# Patient Record
Sex: MALE | Race: WHITE | Employment: FULL TIME | URBAN - METROPOLITAN AREA
[De-identification: names, ages, dates, MRNs, and addresses within clinical notes are randomized per-mention and may not be internally consistent; named-entity substitution may affect disease eponyms.]

---

## 2017-01-29 NOTE — ED INITIAL ASSESSMENT (HPI)
Pt was found passed out on the floor in the lobby of his apartment building. States he is having \"pain all over\" due to his arthritis. But denies any acute injury or pain. Pt admits to drinking vodka, unsure how much.

## 2017-01-29 NOTE — ED PROVIDER NOTES
Patient Seen in: [facility] AND [facility] Emergency Department    History   Patient presents with:  Alcohol Intoxication (neurologic)    Stated Complaint:  Alcohol intoxication    HPI    28 yo M without PMH presenting for evaluation via EMS after being found pass Resp 16  SpO2 100%        Physical Exam   Constitutional: No distress. Smells of alcohol, inebriated. Slurring speech  HEENT: MMM. Head: Normocephalic. Atraumatic. Eyes: No injection. Pupils midrange and equally reactive. Neck: Neck supple.  No midline c

## 2017-01-29 NOTE — ED NOTES
Pt walked to restroom with assistance's, pt gait is unsteady at this time, pt now sitting up eating breakfast, pt denies having anyone to pick him up

## 2017-06-06 ENCOUNTER — HOSPITAL ENCOUNTER (OUTPATIENT)
Age: 37
Discharge: HOME | End: 2017-06-06
Payer: COMMERCIAL

## 2017-06-06 VITALS
BODY MASS INDEX: 19.64 KG/M2 | RESPIRATION RATE: 16 BRPM | HEART RATE: 95 BPM | WEIGHT: 145 LBS | TEMPERATURE: 98 F | SYSTOLIC BLOOD PRESSURE: 134 MMHG | HEIGHT: 72 IN | DIASTOLIC BLOOD PRESSURE: 82 MMHG | OXYGEN SATURATION: 98 %

## 2017-06-06 DIAGNOSIS — R11.11 NON-INTRACTABLE VOMITING WITHOUT NAUSEA, UNSPECIFIED VOMITING TYPE: Primary | ICD-10-CM

## 2017-06-06 PROCEDURE — 99213 OFFICE O/P EST LOW 20 MIN: CPT

## 2017-06-06 PROCEDURE — 99214 OFFICE O/P EST MOD 30 MIN: CPT

## 2017-06-06 RX ORDER — ONDANSETRON 4 MG/1
4 TABLET, ORALLY DISINTEGRATING ORAL EVERY 8 HOURS PRN
Qty: 10 TABLET | Refills: 0 | Status: SHIPPED | OUTPATIENT
Start: 2017-06-06 | End: 2017-06-13

## 2017-06-06 NOTE — ED INITIAL ASSESSMENT (HPI)
PATIENT ARRIVED AMBULATORY TO ROOM. SYMPTOMS STARTED YESTERDAY. +GENERALIZED WEAKNESS AND FEVERS. PT DENIES TAKING TEMPERATURES BUT STATES \"I FELT HOT\"  PATIENT STATES LAST NIGHT HE STARTED VOMITING.  PATIENT HAD SEVERAL EPISODES OF VOMITING THROUGHOUT THE

## 2017-06-06 NOTE — ED PROVIDER NOTES
Patient Seen in: [facility]    History   Patient presents with:  Vomiting    Stated Complaint: vomiting    HPI    Patient is a 60-year-old male who presents for evaluation of intermittent vomiting since 1 PM yesterday.   P vomiting. Negative for abdominal pain and diarrhea. Skin: Negative. Positive for stated complaint: vomiting  Other systems are as noted in HPI. Constitutional and vital signs reviewed.       All other systems reviewed and negative except as noted follow-up with primary doctor. Go to ER if symptoms worsen or if there is concern for dehydration.              Disposition and Plan     Clinical Impression:  Non-intractable vomiting without nausea, unspecified vomiting type  (primary encounter diagnosis)

## 2017-09-01 ENCOUNTER — HOSPITAL ENCOUNTER (INPATIENT)
Facility: HOSPITAL | Age: 37
LOS: 2 days | Discharge: HOME | DRG: 103 | End: 2017-09-04
Attending: EMERGENCY MEDICINE | Admitting: HOSPITALIST
Payer: COMMERCIAL

## 2017-09-01 DIAGNOSIS — E10.10 TYPE 1 DIABETES MELLITUS WITH KETOACIDOSIS WITHOUT COMA (HCC): Primary | ICD-10-CM

## 2017-09-01 LAB
ALBUMIN SERPL BCP-MCNC: 4.1 G/DL (ref 3.5–4.8)
ALBUMIN/GLOB SERPL: 1.1 {RATIO} (ref 1–2)
ALP SERPL-CCNC: 44 U/L (ref 32–100)
ALT SERPL-CCNC: 72 U/L (ref 17–63)
ANION GAP SERPL CALC-SCNC: 25 MMOL/L (ref 0–18)
AST SERPL-CCNC: 165 U/L (ref 15–41)
BASOPHILS # BLD: 0 K/UL (ref 0–0.2)
BASOPHILS NFR BLD: 0 %
BILIRUB SERPL-MCNC: 3.5 MG/DL (ref 0.3–1.2)
BUN SERPL-MCNC: 13 MG/DL (ref 8–20)
BUN/CREAT SERPL: 6.5 (ref 10–20)
CALCIUM SERPL-MCNC: 9.5 MG/DL (ref 8.5–10.5)
CHLORIDE SERPL-SCNC: 99 MMOL/L (ref 95–110)
CO2 SERPL-SCNC: 16 MMOL/L (ref 22–32)
CREAT SERPL-MCNC: 1.99 MG/DL (ref 0.5–1.5)
EOSINOPHIL # BLD: 0 K/UL (ref 0–0.7)
EOSINOPHIL NFR BLD: 0 %
ERYTHROCYTE [DISTWIDTH] IN BLOOD BY AUTOMATED COUNT: 12.7 % (ref 11–15)
GLOBULIN PLAS-MCNC: 3.6 G/DL (ref 2.5–3.7)
GLUCOSE SERPL-MCNC: 161 MG/DL (ref 70–99)
HCT VFR BLD AUTO: 43.9 % (ref 41–52)
HGB BLD-MCNC: 14.6 G/DL (ref 13.5–17.5)
LIPASE SERPL-CCNC: 22 U/L (ref 22–51)
LYMPHOCYTES # BLD: 0.3 K/UL (ref 1–4)
LYMPHOCYTES NFR BLD: 2 %
MCH RBC QN AUTO: 34.6 PG (ref 27–32)
MCHC RBC AUTO-ENTMCNC: 33.3 G/DL (ref 32–37)
MCV RBC AUTO: 103.7 FL (ref 80–100)
MONOCYTES # BLD: 1 K/UL (ref 0–1)
MONOCYTES NFR BLD: 6 %
NEUTROPHILS # BLD AUTO: 15.5 K/UL (ref 1.8–7.7)
NEUTROPHILS NFR BLD: 92 %
OSMOLALITY UR CALC.SUM OF ELEC: 294 MOSM/KG (ref 275–295)
PLATELET # BLD AUTO: 208 K/UL (ref 140–400)
PMV BLD AUTO: 9.5 FL (ref 7.4–10.3)
POTASSIUM SERPL-SCNC: 7.1 MMOL/L (ref 3.3–5.1)
PROT SERPL-MCNC: 7.7 G/DL (ref 5.9–8.4)
RBC # BLD AUTO: 4.23 M/UL (ref 4.5–5.9)
SODIUM SERPL-SCNC: 140 MMOL/L (ref 136–144)
WBC # BLD AUTO: 16.9 K/UL (ref 4–11)

## 2017-09-01 RX ORDER — CALCIUM GLUCONATE 94 MG/ML
1 INJECTION, SOLUTION INTRAVENOUS ONCE
Status: COMPLETED | OUTPATIENT
Start: 2017-09-01 | End: 2017-09-02

## 2017-09-01 RX ORDER — FAMOTIDINE 20 MG/1
20 TABLET ORAL
COMMUNITY
End: 2018-07-12 | Stop reason: ALTCHOICE

## 2017-09-01 RX ORDER — SODIUM CHLORIDE 9 MG/ML
INJECTION, SOLUTION INTRAVENOUS CONTINUOUS
Status: DISCONTINUED | OUTPATIENT
Start: 2017-09-01 | End: 2017-09-02

## 2017-09-01 RX ORDER — LORAZEPAM 2 MG/ML
1 INJECTION INTRAMUSCULAR ONCE
Status: COMPLETED | OUTPATIENT
Start: 2017-09-01 | End: 2017-09-01

## 2017-09-01 RX ORDER — DEXTROSE MONOHYDRATE 25 G/50ML
50 INJECTION, SOLUTION INTRAVENOUS ONCE
Status: DISCONTINUED | OUTPATIENT
Start: 2017-09-01 | End: 2017-09-04

## 2017-09-01 RX ORDER — SODIUM POLYSTYRENE SULFONATE 15 G/60ML
30 SUSPENSION ORAL; RECTAL ONCE
Status: COMPLETED | OUTPATIENT
Start: 2017-09-01 | End: 2017-09-02

## 2017-09-01 RX ORDER — ONDANSETRON 4 MG/1
4 TABLET, ORALLY DISINTEGRATING ORAL ONCE
Status: COMPLETED | OUTPATIENT
Start: 2017-09-01 | End: 2017-09-01

## 2017-09-02 ENCOUNTER — APPOINTMENT (OUTPATIENT)
Dept: GENERAL RADIOLOGY | Facility: HOSPITAL | Age: 37
DRG: 103 | End: 2017-09-02
Attending: EMERGENCY MEDICINE
Payer: COMMERCIAL

## 2017-09-02 ENCOUNTER — APPOINTMENT (OUTPATIENT)
Dept: GENERAL RADIOLOGY | Facility: HOSPITAL | Age: 37
DRG: 103 | End: 2017-09-02
Attending: INTERNAL MEDICINE
Payer: COMMERCIAL

## 2017-09-02 PROBLEM — E10.10 TYPE 1 DIABETES MELLITUS WITH KETOACIDOSIS WITHOUT COMA (HCC): Status: ACTIVE | Noted: 2017-09-02

## 2017-09-02 PROBLEM — E87.2 ACIDOSIS: Status: ACTIVE | Noted: 2017-09-02

## 2017-09-02 PROBLEM — E87.20 ACIDOSIS: Status: ACTIVE | Noted: 2017-09-02

## 2017-09-02 LAB
ANION GAP SERPL CALC-SCNC: 5 MMOL/L (ref 0–18)
ANION GAP SERPL CALC-SCNC: 6 MMOL/L (ref 0–18)
BACTERIA UR QL AUTO: NEGATIVE /HPF
BASE EXCESS BLD CALC-SCNC: -0.3 MMOL/L (ref ?–2)
BASE EXCESS BLD CALC-SCNC: -1.2 MMOL/L (ref ?–2)
BASOPHILS # BLD: 0.1 K/UL (ref 0–0.2)
BASOPHILS NFR BLD: 1 %
BILIRUB UR QL: NEGATIVE
BUN SERPL-MCNC: 15 MG/DL (ref 8–20)
BUN SERPL-MCNC: 18 MG/DL (ref 8–20)
BUN/CREAT SERPL: 10.7 (ref 10–20)
BUN/CREAT SERPL: 11.9 (ref 10–20)
CA-I BLD-SCNC: 1.13 MMOL/L (ref 0.95–1.32)
CALCIUM SERPL-MCNC: 7.4 MG/DL (ref 8.5–10.5)
CALCIUM SERPL-MCNC: 7.5 MG/DL (ref 8.5–10.5)
CHLORIDE SERPL-SCNC: 106 MMOL/L (ref 95–110)
CHLORIDE SERPL-SCNC: 109 MMOL/L (ref 95–110)
CO2 SERPL-SCNC: 23 MMOL/L (ref 22–32)
CO2 SERPL-SCNC: 26 MMOL/L (ref 22–32)
COHGB MFR BLD: 1.6 % (ref 0–1.5)
COLOR UR: YELLOW
CREAT SERPL-MCNC: 1.4 MG/DL (ref 0.5–1.5)
CREAT SERPL-MCNC: 1.51 MG/DL (ref 0.5–1.5)
EOSINOPHIL # BLD: 0 K/UL (ref 0–0.7)
EOSINOPHIL NFR BLD: 0 %
ERYTHROCYTE [DISTWIDTH] IN BLOOD BY AUTOMATED COUNT: 12.1 % (ref 11–15)
ETHANOL SERPL-MCNC: 1 MG/DL
FERRITIN SERPL IA-MCNC: 326 NG/ML (ref 24–336)
FOLATE SERPL-MCNC: 2.7 NG/ML
GLUCOSE BLDC GLUCOMTR-MCNC: 105 MG/DL (ref 70–99)
GLUCOSE BLDC GLUCOMTR-MCNC: 116 MG/DL (ref 70–99)
GLUCOSE BLDC GLUCOMTR-MCNC: 127 MG/DL (ref 70–99)
GLUCOSE BLDC GLUCOMTR-MCNC: 149 MG/DL (ref 70–99)
GLUCOSE BLDC GLUCOMTR-MCNC: 182 MG/DL (ref 70–99)
GLUCOSE BLDC GLUCOMTR-MCNC: 184 MG/DL (ref 70–99)
GLUCOSE BLDC GLUCOMTR-MCNC: 189 MG/DL (ref 70–99)
GLUCOSE BLDC GLUCOMTR-MCNC: 253 MG/DL (ref 70–99)
GLUCOSE BLDC GLUCOMTR-MCNC: 279 MG/DL (ref 70–99)
GLUCOSE BLDC GLUCOMTR-MCNC: 293 MG/DL (ref 70–99)
GLUCOSE BLDC GLUCOMTR-MCNC: 312 MG/DL (ref 70–99)
GLUCOSE BLDC GLUCOMTR-MCNC: 78 MG/DL (ref 70–99)
GLUCOSE SERPL-MCNC: 172 MG/DL (ref 70–99)
GLUCOSE SERPL-MCNC: 191 MG/DL (ref 70–99)
GLUCOSE UR-MCNC: >=500 MG/DL
GRAN CASTS #/AREA UR COMP ASSIST: 3 /LPF
HBA1C MFR BLD: 4.4 % (ref 4–6)
HBA1C MFR BLD: 4.4 % (ref 4–6)
HCO3 BLDA-SCNC: 22.3 MEQ/L (ref 21–27)
HCO3 BLDA-SCNC: 22.3 MEQ/L (ref 21–27)
HCT VFR BLD AUTO: 29.8 % (ref 41–52)
HCT VFR BLD AUTO: 30.3 % (ref 41–52)
HGB BLD-MCNC: 10.2 G/DL (ref 13.5–17.5)
HGB BLD-MCNC: 10.3 G/DL (ref 13.5–17.5)
HGB BLD-MCNC: 9.9 G/DL (ref 13.5–17.5)
HGB UR QL STRIP.AUTO: NEGATIVE
HYALINE CASTS #/AREA URNS AUTO: 14 /LPF
KETONES UR-MCNC: 20 MG/DL
LACTATE SERPL-SCNC: 0.9 MMOL/L (ref 0.5–2.2)
LACTATE SERPL-SCNC: 2.6 MMOL/L (ref 0.5–2.2)
LACTATE SERPL-SCNC: 6.7 MMOL/L (ref 0.5–2.2)
LEUKOCYTE ESTERASE UR QL STRIP.AUTO: NEGATIVE
LYMPHOCYTES # BLD: 0.5 K/UL (ref 1–4)
LYMPHOCYTES NFR BLD: 6 %
MAGNESIUM SERPL-MCNC: 1.2 MG/DL (ref 1.8–2.5)
MCH RBC QN AUTO: 34.8 PG (ref 27–32)
MCHC RBC AUTO-ENTMCNC: 34.2 G/DL (ref 32–37)
MCV RBC AUTO: 101.8 FL (ref 80–100)
METHGB MFR BLD: 0.5 % SAT (ref 0.4–1.5)
MODIFIED ALLEN TEST: POSITIVE
MODIFIED ALLEN TEST: POSITIVE
MONOCYTES # BLD: 0.6 K/UL (ref 0–1)
MONOCYTES NFR BLD: 8 %
MRSA DNA SPEC QL NAA+PROBE: NEGATIVE
NEUTROPHILS # BLD AUTO: 6.4 K/UL (ref 1.8–7.7)
NEUTROPHILS NFR BLD: 81 %
NEUTS BAND NFR BLD: 4 %
NITRITE UR QL STRIP.AUTO: NEGATIVE
O2 CT BLD-SCNC: 11 VOL% (ref 15–23)
O2 CT BLD-SCNC: 12.8 VOL% (ref 15–23)
OSMOLALITY UR CALC.SUM OF ELEC: 290 MOSM/KG (ref 275–295)
OSMOLALITY UR CALC.SUM OF ELEC: 292 MOSM/KG (ref 275–295)
PCO2 BLDA: 29 MM HG (ref 35–45)
PCO2 BLDA: 35 MM HG (ref 35–45)
PH BLDA: 7.42 [PH] (ref 7.35–7.45)
PH BLDA: 7.5 [PH] (ref 7.35–7.45)
PH UR: 5 [PH] (ref 5–8)
PHOSPHATE SERPL-MCNC: 1.4 MG/DL (ref 2.4–4.7)
PLATELET # BLD AUTO: 106 K/UL (ref 140–400)
PMV BLD AUTO: 9.8 FL (ref 7.4–10.3)
PO2 BLDA: 109 MM HG (ref 80–100)
PO2 BLDA: 58 MM HG (ref 80–100)
PO2 SATN ADJ TO 0.5 BLD: 22 MMHG (ref 24–28)
PO2 SATN ADJ TO 0.5 BLD: 24 MMHG (ref 24–28)
POTASSIUM (BLOOD GAS): 3.7 MMOL/L (ref 3.3–5.1)
POTASSIUM SERPL-SCNC: 3.6 MMOL/L (ref 3.3–5.1)
POTASSIUM SERPL-SCNC: 3.7 MMOL/L (ref 3.3–5.1)
PROT UR-MCNC: 30 MG/DL
PUNCTURE CHARGE: YES
PUNCTURE CHARGE: YES
RBC # BLD AUTO: 2.93 M/UL (ref 4.5–5.9)
RBC #/AREA URNS AUTO: 3 /HPF
RETICS/RBC NFR AUTO: 1 % (ref 0.5–1.5)
SAO2 % BLDA: 93.4 % (ref 94–100)
SAO2 % BLDA: 97.7 % (ref 94–100)
SODIUM (BLOOD GAS): 141 MMOL/L (ref 135–145)
SODIUM SERPL-SCNC: 137 MMOL/L (ref 136–144)
SODIUM SERPL-SCNC: 138 MMOL/L (ref 136–144)
SP GR UR STRIP: 1.02 (ref 1–1.03)
UROBILINOGEN UR STRIP-ACNC: <2
VIT B12 SERPL-MCNC: 394 PG/ML (ref 181–914)
VIT C UR-MCNC: 40 MG/DL
WBC # BLD AUTO: 7.5 K/UL (ref 4–11)
WBC #/AREA URNS AUTO: 4 /HPF

## 2017-09-02 PROCEDURE — 71010 XR CHEST AP PORTABLE  (CPT=71010): CPT | Performed by: INTERNAL MEDICINE

## 2017-09-02 PROCEDURE — 71010 XR CHEST AP PORTABLE  (CPT=71010): CPT | Performed by: EMERGENCY MEDICINE

## 2017-09-02 RX ORDER — ARIPIPRAZOLE 15 MG/1
40 TABLET ORAL ONCE
Status: COMPLETED | OUTPATIENT
Start: 2017-09-02 | End: 2017-09-02

## 2017-09-02 RX ORDER — ONDANSETRON 2 MG/ML
4 INJECTION INTRAMUSCULAR; INTRAVENOUS EVERY 6 HOURS PRN
Status: DISCONTINUED | OUTPATIENT
Start: 2017-09-02 | End: 2017-09-04

## 2017-09-02 RX ORDER — HEPARIN SODIUM 5000 [USP'U]/ML
5000 INJECTION, SOLUTION INTRAVENOUS; SUBCUTANEOUS EVERY 12 HOURS SCHEDULED
Status: DISCONTINUED | OUTPATIENT
Start: 2017-09-02 | End: 2017-09-04

## 2017-09-02 RX ORDER — DEXTROSE MONOHYDRATE 25 G/50ML
50 INJECTION, SOLUTION INTRAVENOUS AS NEEDED
Status: DISCONTINUED | OUTPATIENT
Start: 2017-09-02 | End: 2017-09-04

## 2017-09-02 RX ORDER — MAGNESIUM OXIDE 400 MG (241.3 MG MAGNESIUM) TABLET
800 TABLET ONCE
Status: COMPLETED | OUTPATIENT
Start: 2017-09-02 | End: 2017-09-02

## 2017-09-02 RX ORDER — LORAZEPAM 2 MG/ML
4 INJECTION INTRAMUSCULAR EVERY 10 MIN PRN
Status: DISCONTINUED | OUTPATIENT
Start: 2017-09-02 | End: 2017-09-04

## 2017-09-02 RX ORDER — NAPROXEN 125 MG/5ML
220 SUSPENSION ORAL AS NEEDED
Status: ON HOLD | COMMUNITY
End: 2017-09-04

## 2017-09-02 RX ORDER — ACETAMINOPHEN 325 MG/1
650 TABLET ORAL EVERY 6 HOURS PRN
Status: DISCONTINUED | OUTPATIENT
Start: 2017-09-02 | End: 2017-09-04

## 2017-09-02 RX ORDER — LORAZEPAM 2 MG/ML
1 INJECTION INTRAMUSCULAR EVERY 30 MIN PRN
Status: DISCONTINUED | OUTPATIENT
Start: 2017-09-02 | End: 2017-09-04

## 2017-09-02 RX ORDER — SODIUM CHLORIDE 9 MG/ML
INJECTION, SOLUTION INTRAVENOUS CONTINUOUS
Status: DISCONTINUED | OUTPATIENT
Start: 2017-09-02 | End: 2017-09-03

## 2017-09-02 RX ORDER — MULTIPLE VITAMINS W/ MINERALS TAB 9MG-400MCG
1 TAB ORAL DAILY
Status: DISCONTINUED | OUTPATIENT
Start: 2017-09-02 | End: 2017-09-04

## 2017-09-02 RX ORDER — MELATONIN
100 DAILY
Status: DISCONTINUED | OUTPATIENT
Start: 2017-09-02 | End: 2017-09-04

## 2017-09-02 RX ORDER — FOLIC ACID 1 MG/1
1 TABLET ORAL DAILY
Status: DISCONTINUED | OUTPATIENT
Start: 2017-09-02 | End: 2017-09-04

## 2017-09-02 RX ORDER — LORAZEPAM 2 MG/ML
2 INJECTION INTRAMUSCULAR
Status: DISCONTINUED | OUTPATIENT
Start: 2017-09-02 | End: 2017-09-04

## 2017-09-02 RX ORDER — LORAZEPAM 2 MG/ML
3 INJECTION INTRAMUSCULAR
Status: DISCONTINUED | OUTPATIENT
Start: 2017-09-02 | End: 2017-09-04

## 2017-09-02 RX ORDER — ONDANSETRON 4 MG/1
4 TABLET, FILM COATED ORAL EVERY 6 HOURS PRN
COMMUNITY
End: 2017-10-05 | Stop reason: ALTCHOICE

## 2017-09-02 NOTE — PLAN OF CARE
Problem: Diabetes/Glucose Control  Goal: Glucose maintained within prescribed range  INTERVENTIONS:  - Monitor Blood Glucose as ordered  - Assess for signs and symptoms of hyperglycemia and hypoglycemia  - Administer ordered medications to maintain glucose prescribed range  INTERVENTIONS:  - Monitor Blood Glucose as ordered  - Assess for signs and symptoms of hyperglycemia and hypoglycemia  - Administer ordered medications to maintain glucose within target range  - Assess barriers to adequate nutritional int

## 2017-09-02 NOTE — PROGRESS NOTES
Transfer report given to AnglicanJU PADRON. All his belongings were packed. He has a mobile phone and tablet and clothings.  He is going to room 530-a

## 2017-09-02 NOTE — PROGRESS NOTES
[facility]      Sepsis Reassessment Note    BP 99/60 (BP Location: Right arm)   Pulse 99   Temp 99.7 °F (37.6 °C) (Temporal)   Resp 21   Ht 6' (1.829 m)   Wt 145 lb (65.8 kg)   SpO2 95%   BMI 19.67 kg/m²      11:33 AM    Cardiac:  Regular

## 2017-09-02 NOTE — H&P
[facility]    History & Physical    Date:  9/1/2017   Date of Admission:  9/1/2017      Chief Complaint:   Ismael Lugo is a(n) 40year old male with nausea and vomiting    HPI:   The patient describes cyclical vomiting every 3-6 months. MG Oral Tab Take 20 mg by mouth daily as needed. Vitamin B-12 (VITAMIN B12) 1000 MCG Oral Tab 1 tablet daily. Mometasone Furoate (NASONEX) 50 MCG/ACT Nasal Suspension by Nasal route daily.        Review of Systems:   Vision normal. Ear nose and throat simply be related to his refractory emesis yesterday. He is vastly improved at this juncture. He is no longer vomiting and his electrolytes are much better.   Initially, diabetes was a diagnostic consideration; however, his glycohemoglobin is well within

## 2017-09-02 NOTE — ED PROVIDER NOTES
Patient Seen in: [facility] Emergency Department    History   Patient presents with:  Nausea/Vomiting/Diarrhea (gastrointestinal)    Stated Complaint: pt has been throwing up since 4pm approx 8 times since then abd pain with back *    HPI    36-ye signs reviewed. All other systems reviewed and negative except as noted above. PSFH elements reviewed from today and agreed except as otherwise stated in HPI.     Physical Exam   ED Triage Vitals  BP: 112/72 [09/01/17 2039]  Pulse: 135 [09/01/17 203 METABOLIC PANEL (14) - Abnormal; Notable for the following:     Glucose 161 (*)     Potassium 7.1 (*)     CO2 16 (*)     Creatinine 1.99 (*)     ALT 72 (*)      (*)     Bilirubin, Total 3.5 (*)     Anion Gap 25 (*)     BUN/CREA Ratio 6.5 (*)     GFR BLOOD CULTURE   BLOOD CULTURE     EKG    Rate, intervals and axes as noted on EKG Report. Rate: 99  Rhythm: Sinus Rhythm  Reading: early repol.  Hyperacute t waves           ============================================================  ED Course  -------

## 2017-09-02 NOTE — PROGRESS NOTES
Night MD - CCU Admission     Impressions:  ---Periodic episodes of N/V, lasting few hours to 16 hours, ~ Q 3 months on average, for ~10 years, with \"back spasms\" associated. Carries dx of cyclical vomiting (see UpToDate regarding this dx).   ---EtOH abus risk factors for DVT/PE, will not request V-Q scan or CTPA at this time. (2) Noted Hgb decreased to 10.2 this AM after IV fluid boluses in ED. On repeat ABG's, Hgb appeared to decrease further, to 7.9. Will send repeat H/H.   Will also send retic count, Disp:  Rfl:      No Known Allergies      Social History  Social History   Marital status: Single  Spouse name: N/A    Years of education: N/A  Number of children: N/A     Occupational History  None on file     Social History Main Topics   Smoking status: N mOsm/kg   GFR, Non- 38 (L) >=60   GFR, -American 46 (L) >=60   -LIPASE   Collection Time: 09/01/17 10:22 PM   Result Value Ref Range   Lipase 22 22 - 51 U/L   -CBC W/ DIFFERENTIAL   Collection Time: 09/01/17 10:22 PM   Result Value R - 1 /LPF   WBC Urine 4 0 - 5 /HPF   RBC URINE 3 0 - 3 /HPF   Bacteria Urine Negative None Seen /HPF   -ED/MRSA SCREEN BY PCR-CC   Collection Time: 09/02/17  2:08 AM   Result Value Ref Range   MRSA Screen By PCR Negative Negative   -POCT GLUCOSE   Collectio 6 %   Monocyte % 8 %   Eosinophil % 0 %   Basophil % 1 %   Band % 4 0-10 % %   Neutrophil Absolute 6.4 1.8 - 7.7 K/UL   Lymphocyte Absolute 0.5 (L) 1.0 - 4.0 K/UL   Monocyte Absolute 0.6 0.0 - 1.0 K/UL   Eosinophil Absolute 0.0 0.0 - 0.7 K/UL   Basophil Ab

## 2017-09-03 LAB
GLUCOSE BLDC GLUCOMTR-MCNC: 84 MG/DL (ref 70–99)
GLUCOSE BLDC GLUCOMTR-MCNC: 84 MG/DL (ref 70–99)
GLUCOSE BLDC GLUCOMTR-MCNC: 94 MG/DL (ref 70–99)
GLUCOSE BLDC GLUCOMTR-MCNC: 94 MG/DL (ref 70–99)
HEMOCCULT STL QL: POSITIVE
MAGNESIUM SERPL-MCNC: 1.4 MG/DL (ref 1.8–2.5)
POTASSIUM SERPL-SCNC: 3.6 MMOL/L (ref 3.3–5.1)

## 2017-09-03 PROCEDURE — 99232 SBSQ HOSP IP/OBS MODERATE 35: CPT | Performed by: INTERNAL MEDICINE

## 2017-09-03 PROCEDURE — 99232 SBSQ HOSP IP/OBS MODERATE 35: CPT | Performed by: HOSPITALIST

## 2017-09-03 PROCEDURE — 99254 IP/OBS CNSLTJ NEW/EST MOD 60: CPT | Performed by: INTERNAL MEDICINE

## 2017-09-03 RX ORDER — ARIPIPRAZOLE 15 MG/1
40 TABLET ORAL ONCE
Status: COMPLETED | OUTPATIENT
Start: 2017-09-03 | End: 2017-09-03

## 2017-09-03 RX ORDER — POTASSIUM CHLORIDE 20 MEQ/1
40 TABLET, EXTENDED RELEASE ORAL EVERY 4 HOURS
Status: DISCONTINUED | OUTPATIENT
Start: 2017-09-03 | End: 2017-09-03

## 2017-09-03 RX ORDER — 0.9 % SODIUM CHLORIDE 0.9 %
VIAL (ML) INJECTION
Status: COMPLETED
Start: 2017-09-03 | End: 2017-09-03

## 2017-09-03 RX ORDER — MAGNESIUM OXIDE 400 MG (241.3 MG MAGNESIUM) TABLET
800 TABLET ONCE
Status: COMPLETED | OUTPATIENT
Start: 2017-09-03 | End: 2017-09-03

## 2017-09-03 NOTE — PROGRESS NOTES
[facility]    Progress Note    Jessica Soldparisa Patient Status:  Inpatient    1980 MRN [id number]   Location [facility] 5SW/SE Attending Karley Carter MD   Hosp Day # 1 PCP No primary care provider on file.        Subjectiv tablet Oral Daily   • Insulin Aspart Pen  1-7 Units Subcutaneous TID CC   • dextrose 50%  50 mL Intravenous Once       Current PRN Inpatient Meds:      LORazepam, LORazepam, LORazepam, LORazepam, ondansetron HCl, dextrose 50%, Glucose-Vitamin C, acetaminop major discrepancies. Xr Chest Ap Portable  (cpt=71010)    Result Date: 9/2/2017  CONCLUSION:  1. Normal examination. 2. A preliminary report was submitted and there is agreement without major discrepancies.         Ekg 12-lead    Result Date: 9/1/20

## 2017-09-03 NOTE — PROGRESS NOTES
[facility]    Progress Note      Assessment and Plan:   1. Nausea and vomiting with electrolyte abnormality–the patient may have had alcoholic ketoacidosis as he has 3 drinks of vodka per day.   Alternatively, he could have cyclical vomitin

## 2017-09-03 NOTE — CONSULTS
[facility]  Report of GI Consultation    Yamiletsera Clinton Patient Status:  Inpatient    1980 MRN [id number]   Location [facility] 5SW/SE Attending Sarah Arredondo MD   Hosp Day # 1 PCP Maryana Sadler     Date of Admission 24 and 15 respectively. Ferritin this time during the acute illness is 326. Similar to previous levels of 2014 above. Feeling much better today. Has tolerated solid meals all day yesterday and this morning.   Still weak, some abdominal and back pain INSERTION Bilateral    Family History  Family History   Problem Relation Age of Onset   • Cataracts Mother        Social History  Patient Guardian Status    Mother:  Brandt Vasquez    Father:  David Nascimento    Other Topics            Concern  Caffeine Concern daily.   Mometasone Furoate (NASONEX) 50 MCG/ACT Nasal Suspension by Nasal route daily. Allergies  No Known Allergies    Review of Systems:   No fevers. No blood with the stools. 10 point review of systems is as above otherwise negative.     Physica submitted and there is agreement without major discrepancies.              Impression:       Recommendations:  · Clear liquid diet, advance as tolerated  · Absolute importance of alcohol cessation discussed to clarify the nature of this vomiting and more im

## 2017-09-04 ENCOUNTER — APPOINTMENT (OUTPATIENT)
Dept: GENERAL RADIOLOGY | Facility: HOSPITAL | Age: 37
DRG: 103 | End: 2017-09-04
Attending: HOSPITALIST
Payer: COMMERCIAL

## 2017-09-04 VITALS
OXYGEN SATURATION: 98 % | DIASTOLIC BLOOD PRESSURE: 78 MMHG | HEART RATE: 69 BPM | TEMPERATURE: 99 F | WEIGHT: 166 LBS | HEIGHT: 72 IN | RESPIRATION RATE: 20 BRPM | SYSTOLIC BLOOD PRESSURE: 132 MMHG | BODY MASS INDEX: 22.48 KG/M2

## 2017-09-04 LAB
ALBUMIN SERPL BCP-MCNC: 2.5 G/DL (ref 3.5–4.8)
ALBUMIN/GLOB SERPL: 1 {RATIO} (ref 1–2)
ALP SERPL-CCNC: 25 U/L (ref 32–100)
ALT SERPL-CCNC: 35 U/L (ref 17–63)
ANION GAP SERPL CALC-SCNC: 6 MMOL/L (ref 0–18)
AST SERPL-CCNC: 54 U/L (ref 15–41)
BASOPHILS # BLD: 0 K/UL (ref 0–0.2)
BASOPHILS NFR BLD: 0 %
BILIRUB SERPL-MCNC: 2 MG/DL (ref 0.3–1.2)
BUN SERPL-MCNC: 10 MG/DL (ref 8–20)
BUN/CREAT SERPL: 7.6 (ref 10–20)
CALCIUM SERPL-MCNC: 8.6 MG/DL (ref 8.5–10.5)
CHLORIDE SERPL-SCNC: 107 MMOL/L (ref 95–110)
CO2 SERPL-SCNC: 24 MMOL/L (ref 22–32)
CREAT SERPL-MCNC: 1.31 MG/DL (ref 0.5–1.5)
EOSINOPHIL # BLD: 0 K/UL (ref 0–0.7)
EOSINOPHIL NFR BLD: 1 %
ERYTHROCYTE [DISTWIDTH] IN BLOOD BY AUTOMATED COUNT: 12.2 % (ref 11–15)
GLOBULIN PLAS-MCNC: 2.4 G/DL (ref 2.5–3.7)
GLUCOSE BLDC GLUCOMTR-MCNC: 87 MG/DL (ref 70–99)
GLUCOSE SERPL-MCNC: 98 MG/DL (ref 70–99)
HCT VFR BLD AUTO: 32.1 % (ref 41–52)
HGB BLD-MCNC: 11 G/DL (ref 13.5–17.5)
LYMPHOCYTES # BLD: 1 K/UL (ref 1–4)
LYMPHOCYTES NFR BLD: 19 %
MAGNESIUM SERPL-MCNC: 1.8 MG/DL (ref 1.8–2.5)
MCH RBC QN AUTO: 34.7 PG (ref 27–32)
MCHC RBC AUTO-ENTMCNC: 34.3 G/DL (ref 32–37)
MCV RBC AUTO: 101.3 FL (ref 80–100)
MONOCYTES # BLD: 0.4 K/UL (ref 0–1)
MONOCYTES NFR BLD: 8 %
NEUTROPHILS # BLD AUTO: 3.9 K/UL (ref 1.8–7.7)
NEUTROPHILS NFR BLD: 72 %
OSMOLALITY UR CALC.SUM OF ELEC: 283 MOSM/KG (ref 275–295)
PHOSPHATE SERPL-MCNC: 3.5 MG/DL (ref 2.4–4.7)
PLATELET # BLD AUTO: 93 K/UL (ref 140–400)
PMV BLD AUTO: 9.1 FL (ref 7.4–10.3)
POTASSIUM SERPL-SCNC: 3.6 MMOL/L (ref 3.3–5.1)
PROT SERPL-MCNC: 4.9 G/DL (ref 5.9–8.4)
RBC # BLD AUTO: 3.17 M/UL (ref 4.5–5.9)
SODIUM SERPL-SCNC: 137 MMOL/L (ref 136–144)
WBC # BLD AUTO: 5.4 K/UL (ref 4–11)

## 2017-09-04 PROCEDURE — 71020 XR CHEST PA + LAT CHEST (CPT=71020): CPT | Performed by: HOSPITALIST

## 2017-09-04 PROCEDURE — 99239 HOSP IP/OBS DSCHRG MGMT >30: CPT | Performed by: HOSPITALIST

## 2017-09-04 RX ORDER — MAGNESIUM SULFATE HEPTAHYDRATE 40 MG/ML
2 INJECTION, SOLUTION INTRAVENOUS ONCE
Status: DISCONTINUED | OUTPATIENT
Start: 2017-09-04 | End: 2017-09-04

## 2017-09-04 RX ORDER — PROCHLORPERAZINE 25 MG
25 SUPPOSITORY, RECTAL RECTAL EVERY 12 HOURS PRN
Qty: 10 SUPPOSITORY | Refills: 0 | Status: ON HOLD | OUTPATIENT
Start: 2017-09-04 | End: 2020-07-30

## 2017-09-04 RX ORDER — 0.9 % SODIUM CHLORIDE 0.9 %
VIAL (ML) INJECTION
Status: COMPLETED
Start: 2017-09-04 | End: 2017-09-04

## 2017-09-04 NOTE — PLAN OF CARE
Problem: Diabetes/Glucose Control  Goal: Glucose maintained within prescribed range  INTERVENTIONS:  - Monitor Blood Glucose as ordered  - Assess for signs and symptoms of hyperglycemia and hypoglycemia  - Administer ordered medications to maintain glucose document risk factors for pressure ulcer development  - Assess and document skin integrity  - Monitor for areas of redness and/or skin breakdown  - Initiate interventions, skin care algorithm/standards of care as needed   Outcome: Progressing

## 2017-09-04 NOTE — DISCHARGE SUMMARY
[facility] HOSPITALIST  DISCHARGE SUMMARY     Nohemy Bellamy Patient Status:  Inpatient    1980 MRN [id number]   Location [facility] 5SW/SE Attending Maribell Mora MD   Hosp Day # 2 PCP No primary care provider on file.      DATE OF ADMISSION: electrolytes at the time of discharge. He understands return to the emergency room for increased nausea vomiting pain or other concerning symptoms.     PHYSICAL EXAM:  Temp:  [98.8 °F (37.1 °C)-99.4 °F (37.4 °C)] 98.8 °F (37.1 °C)  Pulse:  [57-69] 69  Resp Bring a paper prescription for each of these medications  · prochlorperazine 25 MG Supp         CONSULTANTS  Consultants     Provider Role Specialty    Татьяна Conway MD Consulting Physician  HOSPITALIST     Montana Weber MD Consulting Physician  CARDI

## 2017-09-04 NOTE — PLAN OF CARE
Focus: chest pain  Data: pt unable to sleep well last night, he c/o chest discomfort since yesterday afternoon but pt did not report his continuous chest pain until the present (5833). He denies sob but he is uncomfortable lying in bed.  Noted more chest di

## 2017-09-05 ENCOUNTER — TELEPHONE (OUTPATIENT)
Dept: CARDIOLOGY UNIT | Facility: HOSPITAL | Age: 37
End: 2017-09-05

## 2017-09-06 ENCOUNTER — TELEPHONE (OUTPATIENT)
Dept: MEDSURG UNIT | Facility: HOSPITAL | Age: 37
End: 2017-09-06

## 2017-09-13 NOTE — PAYOR COMM NOTE
--------------  DISCHARGE REVIEW    Payor: JOHN GARCÍA  Subscriber #:  [id number]  Authorization Number: [id number]    Admit date: 9/2/17  Admit time:  0258  Discharge Date: 9/4/2017 12:34 PM     Admitting Physician: Isa Tadeo MD  Attending Physici there is no apparent infectious process. Zosyn was started empirically but will not be continued at discharge. Patient was also felt to have metabolic acidosis at the time of admission. This may be a combination of his alcohol and lactic acidosis.   Alth Tabs  Commonly known as:  PEPCID      Take 20 mg by mouth daily as needed. Refills:  0     Mometasone Furoate 50 MCG/ACT Susp  Commonly known as:  NASONEX      by Nasal route daily.    Refills:  0     Ondansetron HCl 4 mg tablet  Commonly known as:  Mehnaz Jones provider on file.     REVIEW DOCUMENTATION:     ED Provider Notes      ED Provider Notes signed by Martie Goltz, MD at 9/2/2017  3:55 AM     Author:  Martie Goltz, MD Service:  (none) Author Type:  Physician    Filed:  9/2/2017  3:55 AM Date of Se Comment: Patient does cigars occ. Alcohol use: Yes           1.5 oz/week     Standard drinks or equivalent: 3 per week     Comment: 2- 3  GLASSES VODKA[MM. 2]      Review of Systems    Positive for stated complaint: pt has been throwing up sin vitals reviewed. ED Course[MM. 1]     Labs Reviewed   URINALYSIS WITH CULTURE REFLEX - Abnormal; Notable for the following:        Result Value    Clarity Urine Hazy (*)     Protein Urine 30  (*)     Glucose Urine >=500 (*)     Ketones Urine 20  ( ---------                               -----------         ------                     CBC W/ DIFFERENTIAL[[id number]]          Abnormal            Final result                 Please view results for these tests on the individual orders.    LACTIC ACID 3 Shana Savage MD on 9/1/2017 11:58 PM   MM. 2 - Tono Rodríguez MD on 9/2/2017  3:55 AM   MM. 3 - Tono Rodríguez MD on 9/2/2017  3:54 AM                       H&P - H&P Note      H&P signed by Michelle Uribe MD at 9/2/2017 11:50 AM     Author:  Bonnie Morgan, Comment: Patient does cigars occ. Alcohol use: Yes           1.5 oz/week     Standard drinks or equivalent: 3 per week     Comment: 2- 3  GLASSES VODKA    Allergies/Medications:    Allergies: No Known Allergies    Prescriptions Prior to [address] ALT 72 09/01/2017   LIP 22 09/01/2017   MG 1.2 09/02/2017   PHOS 1.4 09/02/2017   ETOH 1 09/02/2017     Chest x-ray–normal    EKG–repolarization variant    Glycohemoglobin–4.4. Assessment/Plan:   1.   Nausea and vomiting with electrolyte abnormality–th

## 2017-09-19 ENCOUNTER — TELEPHONE (OUTPATIENT)
Dept: GASTROENTEROLOGY | Facility: CLINIC | Age: 37
End: 2017-09-19

## 2017-09-19 DIAGNOSIS — E83.110 HEREDITARY HEMOCHROMATOSIS (HCC): Primary | ICD-10-CM

## 2017-09-19 DIAGNOSIS — R79.89 ELEVATED LFTS: ICD-10-CM

## 2017-09-19 NOTE — TELEPHONE ENCOUNTER
Pt requesting to be seen sooner than next avail for consult re: Hospital follow up. Pls call. Thank you.

## 2017-09-19 NOTE — TELEPHONE ENCOUNTER
Pt contacted and accepted consult appt with [facility] on 10/5/17 @ 1pm, told to arrive at 12:45pm, and directions provided to the [facility].

## 2017-10-04 NOTE — PROGRESS NOTES
[facility] Follow-up Visit    Darby patient. Patient occasionally donated blood until about 10 years ago when he states work became too busy to allow this. No therapeutic phlebotomy has recently been performed.   Patient's last MRI of the abdomen was November 2014 which showed \"mild hepatic IBD.    Prior endoscopies:  2014 EGD performed by Dr. Jethro Presley, per patient: Minimal inflammation, pathology report in [facility] describes unremarkable esophagus/duodenal biopsies    Social Hx:  + Former cigar smoker  + current smokeless tobacco user   + etoh pain  GASTROINTESTINAL:  see HPI  GENITOURINARY:  negative for dysuria and hematuria   SKIN:  negative for  rash  ALLERGIC/IMMUNOLOGIC:  negative for hay fever or seasonal allergies  ENDOCRINE:  negative for cold intolerance and heat intolerance  MUSCULOSK use will have on his labs and any possible future biopsy. Patient is interested in alcohol cessation. I provided a referral to [facility] patient is aware that [facility] would be contacting him regarding their services.   Patient will complete a repeat

## 2017-10-05 ENCOUNTER — APPOINTMENT (OUTPATIENT)
Dept: LAB | Facility: HOSPITAL | Age: 37
End: 2017-10-05
Attending: NURSE PRACTITIONER
Payer: COMMERCIAL

## 2017-10-05 ENCOUNTER — OFFICE VISIT (OUTPATIENT)
Dept: GASTROENTEROLOGY | Facility: CLINIC | Age: 37
End: 2017-10-05

## 2017-10-05 VITALS
HEIGHT: 72 IN | HEART RATE: 89 BPM | BODY MASS INDEX: 21.13 KG/M2 | WEIGHT: 156 LBS | TEMPERATURE: 99 F | SYSTOLIC BLOOD PRESSURE: 125 MMHG | DIASTOLIC BLOOD PRESSURE: 85 MMHG

## 2017-10-05 DIAGNOSIS — R11.2 NAUSEA AND VOMITING, INTRACTABILITY OF VOMITING NOT SPECIFIED, UNSPECIFIED VOMITING TYPE: ICD-10-CM

## 2017-10-05 DIAGNOSIS — E83.110 HEREDITARY HEMOCHROMATOSIS (HCC): ICD-10-CM

## 2017-10-05 DIAGNOSIS — R79.89 ELEVATED LFTS: Primary | ICD-10-CM

## 2017-10-05 DIAGNOSIS — R79.89 ELEVATED LFTS: ICD-10-CM

## 2017-10-05 DIAGNOSIS — F10.10 ALCOHOL ABUSE: ICD-10-CM

## 2017-10-05 PROCEDURE — 99212 OFFICE O/P EST SF 10 MIN: CPT | Performed by: NURSE PRACTITIONER

## 2017-10-05 PROCEDURE — 99243 OFF/OP CNSLTJ NEW/EST LOW 30: CPT | Performed by: NURSE PRACTITIONER

## 2017-10-05 PROCEDURE — 80076 HEPATIC FUNCTION PANEL: CPT

## 2017-10-05 PROCEDURE — 36415 COLL VENOUS BLD VENIPUNCTURE: CPT

## 2017-10-05 RX ORDER — GARLIC EXTRACT 500 MG
1 CAPSULE ORAL DAILY
COMMUNITY
End: 2019-04-10 | Stop reason: ALTCHOICE

## 2017-10-09 NOTE — TELEPHONE ENCOUNTER
Left message on pt's voicemail, stating I mailed lab orders that are due April 9, 2018, and highlighted due dates. Also asked him to call back--will make OV appt for pt to be done a week or so after labs done.  Also want to see if pt has followed up with Relda Fraction

## 2017-10-09 NOTE — TELEPHONE ENCOUNTER
Nursing: After reviewing the patient's chart with Dr. Marilu Cervantes, would recommend routine office follow-up and labs every 6-12 months. Have the patient schedule a six-month office visit to follow-up. I have also placed lab orders to be completed in 6 months.

## 2017-12-12 NOTE — PROGRESS NOTES
[facility] Follow-up Visit    Darby party. He noted that the smells of certain food caused him to be nauseous and feel ill. He reports that he has not been eating nearly as much due to his symptoms.   He has been trying to consume a bland foods and avoid anything that is overly rich or spic Family History   Problem Relation Age of Onset   • Cataracts Mother       Social History: Smoking status: Never Smoker                                                              Smokeless tobacco: Current User                    Comment: Patient does c are warm and well perfused   Lung: effort normal and breath sounds normal, no respiratory distress, wheezes or rales  GI: soft, non-tender exam in all quadrants without rigidity or guarding, non-distended, no abnormal bowel sounds noted, no masses are palp Hereditary hemochromatosis: Previously followed with Dr. Wilmar Myles. States he was told by a previous provider (he is not certain which one) that he did not require phlebotomy therapy. He has not followed up since then.      3. Hyperbilirubinemia    4.  OA

## 2017-12-13 NOTE — PATIENT INSTRUCTIONS
1. Follow up w/ Abdifatah Acevedo  2. Complete stool testing  3. Continue bland diet  4. Start CoQ10 and probiotics  5.  Stop Imodium until stool testing comes back

## 2017-12-19 ENCOUNTER — TELEPHONE (OUTPATIENT)
Dept: GASTROENTEROLOGY | Facility: CLINIC | Age: 37
End: 2017-12-19

## 2017-12-19 NOTE — TELEPHONE ENCOUNTER
Pts wife called to inform [facility] that pt has not been able to eat has not appetite and can not complete stool sample kit order. Pts wife is also requesting a refill for Rx ondansetron prescribed for the pt at ER visit.  Thank you       Current Outpatient Prescr

## 2017-12-28 ENCOUNTER — HOSPITAL ENCOUNTER (EMERGENCY)
Facility: HOSPITAL | Age: 37
Discharge: HOME | End: 2017-12-29
Attending: EMERGENCY MEDICINE
Payer: COMMERCIAL

## 2017-12-28 VITALS
HEIGHT: 72 IN | BODY MASS INDEX: 19.64 KG/M2 | TEMPERATURE: 98 F | DIASTOLIC BLOOD PRESSURE: 68 MMHG | SYSTOLIC BLOOD PRESSURE: 103 MMHG | WEIGHT: 145 LBS | OXYGEN SATURATION: 98 % | RESPIRATION RATE: 18 BRPM | HEART RATE: 60 BPM

## 2017-12-28 DIAGNOSIS — R11.2 NAUSEA VOMITING AND DIARRHEA: Primary | ICD-10-CM

## 2017-12-28 DIAGNOSIS — R79.89 ELEVATED LFTS: ICD-10-CM

## 2017-12-28 DIAGNOSIS — R19.7 NAUSEA VOMITING AND DIARRHEA: Primary | ICD-10-CM

## 2017-12-28 LAB
ALBUMIN SERPL BCP-MCNC: 3.6 G/DL (ref 3.5–4.8)
ALP SERPL-CCNC: 58 U/L (ref 32–100)
ALT SERPL-CCNC: 112 U/L (ref 17–63)
ANION GAP SERPL CALC-SCNC: 12 MMOL/L (ref 0–18)
AST SERPL-CCNC: 160 U/L (ref 15–41)
BACTERIA UR QL AUTO: NEGATIVE /HPF
BASOPHILS # BLD: 0 K/UL (ref 0–0.2)
BASOPHILS NFR BLD: 1 %
BILIRUB DIRECT SERPL-MCNC: 0.7 MG/DL (ref 0–0.2)
BILIRUB SERPL-MCNC: 3.2 MG/DL (ref 0.3–1.2)
BILIRUB UR QL: NEGATIVE
BUN SERPL-MCNC: 5 MG/DL (ref 8–20)
BUN/CREAT SERPL: 5.4 (ref 10–20)
CALCIUM SERPL-MCNC: 9.6 MG/DL (ref 8.5–10.5)
CHLORIDE SERPL-SCNC: 98 MMOL/L (ref 95–110)
CLARITY UR: CLEAR
CO2 SERPL-SCNC: 28 MMOL/L (ref 22–32)
COLOR UR: YELLOW
CREAT SERPL-MCNC: 0.93 MG/DL (ref 0.5–1.5)
EOSINOPHIL # BLD: 0.1 K/UL (ref 0–0.7)
EOSINOPHIL NFR BLD: 1 %
ERYTHROCYTE [DISTWIDTH] IN BLOOD BY AUTOMATED COUNT: 12.6 % (ref 11–15)
GLUCOSE BLDC GLUCOMTR-MCNC: 125 MG/DL (ref 70–99)
GLUCOSE BLDC GLUCOMTR-MCNC: 165 MG/DL (ref 70–99)
GLUCOSE BLDC GLUCOMTR-MCNC: 59 MG/DL (ref 70–99)
GLUCOSE SERPL-MCNC: 65 MG/DL (ref 70–99)
GLUCOSE UR-MCNC: >=500 MG/DL
HCT VFR BLD AUTO: 44.6 % (ref 41–52)
HGB BLD-MCNC: 15.3 G/DL (ref 13.5–17.5)
HGB UR QL STRIP.AUTO: NEGATIVE
KETONES UR-MCNC: 20 MG/DL
LEUKOCYTE ESTERASE UR QL STRIP.AUTO: NEGATIVE
LYMPHOCYTES # BLD: 1.4 K/UL (ref 1–4)
LYMPHOCYTES NFR BLD: 31 %
MAGNESIUM SERPL-MCNC: 1.6 MG/DL (ref 1.8–2.5)
MCH RBC QN AUTO: 34.3 PG (ref 27–32)
MCHC RBC AUTO-ENTMCNC: 34.2 G/DL (ref 32–37)
MCV RBC AUTO: 100.3 FL (ref 80–100)
MONOCYTES # BLD: 0.6 K/UL (ref 0–1)
MONOCYTES NFR BLD: 13 %
NEUTROPHILS # BLD AUTO: 2.4 K/UL (ref 1.8–7.7)
NEUTROPHILS NFR BLD: 54 %
NITRITE UR QL STRIP.AUTO: NEGATIVE
OSMOLALITY UR CALC.SUM OF ELEC: 281 MOSM/KG (ref 275–295)
PH UR: 6 [PH] (ref 5–8)
PLATELET # BLD AUTO: 152 K/UL (ref 140–400)
PMV BLD AUTO: 9.4 FL (ref 7.4–10.3)
POTASSIUM SERPL-SCNC: 3.9 MMOL/L (ref 3.3–5.1)
PROT SERPL-MCNC: 7 G/DL (ref 5.9–8.4)
PROT UR-MCNC: NEGATIVE MG/DL
RBC # BLD AUTO: 4.45 M/UL (ref 4.5–5.9)
RBC #/AREA URNS AUTO: 1 /HPF
SODIUM SERPL-SCNC: 138 MMOL/L (ref 136–144)
SP GR UR STRIP: 1.01 (ref 1–1.03)
TSH SERPL-ACNC: 0.88 UIU/ML (ref 0.45–5.33)
UROBILINOGEN UR STRIP-ACNC: 4
VIT C UR-MCNC: NEGATIVE MG/DL
WBC # BLD AUTO: 4.4 K/UL (ref 4–11)
WBC #/AREA URNS AUTO: 1 /HPF

## 2017-12-28 PROCEDURE — 80076 HEPATIC FUNCTION PANEL: CPT | Performed by: EMERGENCY MEDICINE

## 2017-12-28 PROCEDURE — 96361 HYDRATE IV INFUSION ADD-ON: CPT

## 2017-12-28 PROCEDURE — 83735 ASSAY OF MAGNESIUM: CPT | Performed by: EMERGENCY MEDICINE

## 2017-12-28 PROCEDURE — 84443 ASSAY THYROID STIM HORMONE: CPT | Performed by: EMERGENCY MEDICINE

## 2017-12-28 PROCEDURE — 81001 URINALYSIS AUTO W/SCOPE: CPT | Performed by: EMERGENCY MEDICINE

## 2017-12-28 PROCEDURE — 82962 GLUCOSE BLOOD TEST: CPT

## 2017-12-28 PROCEDURE — 85025 COMPLETE CBC W/AUTO DIFF WBC: CPT

## 2017-12-28 PROCEDURE — 99285 EMERGENCY DEPT VISIT HI MDM: CPT

## 2017-12-28 PROCEDURE — 80048 BASIC METABOLIC PNL TOTAL CA: CPT

## 2017-12-28 PROCEDURE — 96366 THER/PROPH/DIAG IV INF ADDON: CPT

## 2017-12-28 PROCEDURE — 96365 THER/PROPH/DIAG IV INF INIT: CPT

## 2017-12-28 PROCEDURE — 80048 BASIC METABOLIC PNL TOTAL CA: CPT | Performed by: EMERGENCY MEDICINE

## 2017-12-28 PROCEDURE — 85025 COMPLETE CBC W/AUTO DIFF WBC: CPT | Performed by: EMERGENCY MEDICINE

## 2017-12-28 RX ORDER — MAGNESIUM SULFATE HEPTAHYDRATE 40 MG/ML
2 INJECTION, SOLUTION INTRAVENOUS ONCE
Status: COMPLETED | OUTPATIENT
Start: 2017-12-28 | End: 2017-12-28

## 2017-12-28 RX ORDER — DICYCLOMINE HCL 20 MG
20 TABLET ORAL 4 TIMES DAILY PRN
Qty: 40 TABLET | Refills: 0 | Status: SHIPPED | OUTPATIENT
Start: 2017-12-28 | End: 2018-01-07

## 2017-12-28 RX ORDER — METOCLOPRAMIDE 10 MG/1
10 TABLET ORAL EVERY 6 HOURS PRN
Qty: 20 TABLET | Refills: 0 | Status: SHIPPED | OUTPATIENT
Start: 2017-12-28 | End: 2018-01-02

## 2017-12-28 NOTE — TELEPHONE ENCOUNTER
The patient was contacted, he continues to consume alcohol multiple drinks at lunch and also at dinner. He has not had any alcohol today. He has noted over the last couple of weeks he has had poor appetite, intermittent vomiting and fatigue.   He has also

## 2017-12-28 NOTE — TELEPHONE ENCOUNTER
Pts wife states that pt still has not been able to eat (for last 2 1/2 wks) and has been vomiting and has diarrhea. Pt is very fatigued and has lost weight also. Call transferred to RN.

## 2017-12-28 NOTE — TELEPHONE ENCOUNTER
Forwarded to Dr Chitra Romero APN is out of office until next Tuesday. Pt is at [phone]. Please review her 12/13 office note. Pt did go to [place] last Wed and Friday. Continues to consume alcohol, but has cut back slightly .   States nausea and l

## 2017-12-28 NOTE — TELEPHONE ENCOUNTER
Forwarded below to both Danielle PEOPLES and Dr Xiomy Wilkins for review. Am uncertain which will be following pt. Note the Justine Spence is out of office until next Tuesday. Thanks.

## 2017-12-29 NOTE — ED NOTES
Patient accepting of plan to DC w/ GI FU. Prescriptions provided with education. Patient understands to FU w/ GI and call for appointment as instructed. He denies any questions/concerns. IV removed. Patient left in good stable condition.

## 2017-12-29 NOTE — ED INITIAL ASSESSMENT (HPI)
Pt to ER with c/o diarrhea and poor appetite x1 month. Pt denies blood in stool. Pt denies nausea/vomiting. Pt denies fever. Pt with hx of cyclic vomiting.

## 2017-12-29 NOTE — ED PROVIDER NOTES
Patient Seen in: [facility] Emergency Department    History   Patient presents with:  Abdomen/Flank Pain (GI/)    Stated Complaint: unable to eat x 3 wks, sent by Dr. Gabriel Adams     HPI    39 yo M with PMH cyclic vomiting presenting for evaluation of s vomiting/diarrhea. Genitourinary: Negative for dysuria and hematuria. Positive for stated complaint: unable to eat x 3 wks, sent by Dr. Gabriella Rosas  Other systems are as noted in HPI. Constitutional and vital signs reviewed.       All other systems reviewed following:     POC Glucose  125 (*)     All other components within normal limits   POCT GLUCOSE - Abnormal; Notable for the following:     POC Glucose  165 (*)     All other components within normal limits   CBC W/ DIFFERENTIAL - Abnormal; Notable for the MD Josue Johnson [id number] [phone]    Call  For followup, re-evaluation and repeat LFTs.       Medications Prescribed:  Current Discharge Medication List    START taking these medications    Metoclopramide HCl 10 MG Oral T

## 2017-12-29 NOTE — ED NOTES
Patient up to the bathroom w/ steady gait. Urine collected and sent; results pending. Patient states he is feeling more perky at this time. He denies any new complaints or changes in assessment. Additional juice provided. Call light in reach. VSS.  Rolandu

## 2018-01-02 NOTE — TELEPHONE ENCOUNTER
Dr Arlene Silva, please see all below. I put pt in a spot with you at [facility] on Mon Jan 22 at 9:30am (first available), but have not yet called pt until you ok, as I was not sure if this was soon enough. Thanks.

## 2018-01-02 NOTE — TELEPHONE ENCOUNTER
Noted and appreciated. Given the patient's history and relapse of symptoms similar to his hospitalization, I would recommend a follow-up with Dr. Devonte Raymond.     FYI to nursing

## 2018-01-02 NOTE — TELEPHONE ENCOUNTER
Mr Barrett Oconnor was evaluated by Dr. Frank Guerrero in the ED on 12/28/2017 after that day's phone call. He was complaining of several weeks of intermittent vomiting and diarrhea. No abdominal pain beyond cramping. Actively drinking as per Dr. Shelby Charlton note.     Labs we

## 2018-01-02 NOTE — TELEPHONE ENCOUNTER
Pt contacted and accepted appt with Dr Tegan Kowalski on Mon Jan 22 with 9:15am arrival, and given detailed directions to [facility]. Pt has PPO, no referral needed.

## 2018-01-19 ENCOUNTER — TELEPHONE (OUTPATIENT)
Dept: GASTROENTEROLOGY | Facility: CLINIC | Age: 38
End: 2018-01-19

## 2018-01-19 ENCOUNTER — LAB ENCOUNTER (OUTPATIENT)
Dept: LAB | Facility: HOSPITAL | Age: 38
End: 2018-01-19
Attending: NURSE PRACTITIONER
Payer: COMMERCIAL

## 2018-01-19 DIAGNOSIS — R19.4 ALTERED BOWEL HABITS: ICD-10-CM

## 2018-01-19 DIAGNOSIS — R10.32 BILATERAL LOWER ABDOMINAL CRAMPING: ICD-10-CM

## 2018-01-19 DIAGNOSIS — E83.110 HEREDITARY HEMOCHROMATOSIS (HCC): ICD-10-CM

## 2018-01-19 DIAGNOSIS — R10.31 BILATERAL LOWER ABDOMINAL CRAMPING: ICD-10-CM

## 2018-01-19 LAB — C DIFF TOX B STL QL: POSITIVE

## 2018-01-19 PROCEDURE — 87046 STOOL CULTR AEROBIC BACT EA: CPT

## 2018-01-19 PROCEDURE — 87272 CRYPTOSPORIDIUM AG IF: CPT

## 2018-01-19 PROCEDURE — 87427 SHIGA-LIKE TOXIN AG IA: CPT

## 2018-01-19 PROCEDURE — 87045 FECES CULTURE AEROBIC BACT: CPT

## 2018-01-19 PROCEDURE — 87329 GIARDIA AG IA: CPT

## 2018-01-19 PROCEDURE — 87493 C DIFF AMPLIFIED PROBE: CPT

## 2018-01-19 RX ORDER — METRONIDAZOLE 500 MG/1
500 TABLET ORAL 3 TIMES DAILY
Qty: 42 TABLET | Refills: 0 | Status: SHIPPED | OUTPATIENT
Start: 2018-01-19 | End: 2018-02-02

## 2018-01-19 NOTE — TELEPHONE ENCOUNTER
Routed to OFFICE on-call (Vaishali Fredo out of office): Incoming call received from lab, [id number], stating the pt is positive for c.diff. Please advise, thank you.

## 2018-01-19 NOTE — TELEPHONE ENCOUNTER
ON CALL COVERING NOTE:    -Cdiff positive, d/w patient he continues to have diarrhea, but non-toxic symptoms. No fevers. No sig ab pain.  -Will start flagyl 500mg TID x14 days  -Encouraged careful handwashing and isolating himself to one toilet at home.  Us

## 2018-01-20 LAB
CRYPTOSP AG STL QL IA: NEGATIVE
G LAMBLIA AG STL QL IA: NEGATIVE

## 2018-01-22 ENCOUNTER — OFFICE VISIT (OUTPATIENT)
Dept: GASTROENTEROLOGY | Facility: CLINIC | Age: 38
End: 2018-01-22

## 2018-01-22 VITALS
HEART RATE: 105 BPM | WEIGHT: 146 LBS | BODY MASS INDEX: 19.56 KG/M2 | HEIGHT: 72.5 IN | SYSTOLIC BLOOD PRESSURE: 137 MMHG | DIASTOLIC BLOOD PRESSURE: 80 MMHG

## 2018-01-22 DIAGNOSIS — A04.72 C. DIFFICILE DIARRHEA: ICD-10-CM

## 2018-01-22 DIAGNOSIS — R11.2 INTRACTABLE VOMITING WITH NAUSEA, UNSPECIFIED VOMITING TYPE: Primary | ICD-10-CM

## 2018-01-22 DIAGNOSIS — F10.10 ALCOHOL ABUSE: ICD-10-CM

## 2018-01-22 DIAGNOSIS — R19.7 DIARRHEA, UNSPECIFIED TYPE: ICD-10-CM

## 2018-01-22 PROCEDURE — 99214 OFFICE O/P EST MOD 30 MIN: CPT | Performed by: INTERNAL MEDICINE

## 2018-01-22 PROCEDURE — 99212 OFFICE O/P EST SF 10 MIN: CPT | Performed by: INTERNAL MEDICINE

## 2018-01-22 RX ORDER — ONDANSETRON 4 MG/1
4 TABLET, FILM COATED ORAL EVERY 8 HOURS PRN
Qty: 30 TABLET | Refills: 5 | Status: SHIPPED | OUTPATIENT
Start: 2018-01-22 | End: 2020-01-09

## 2018-01-22 RX ORDER — METOCLOPRAMIDE 10 MG/1
10 TABLET ORAL AS NEEDED
COMMUNITY
End: 2019-04-10

## 2018-01-22 RX ORDER — DICYCLOMINE HYDROCHLORIDE 10 MG/1
20 CAPSULE ORAL AS NEEDED
COMMUNITY
End: 2018-05-10 | Stop reason: ALTCHOICE

## 2018-01-22 NOTE — PROGRESS NOTES
HPI:    Patient ID: Nohemy Bellamy is a 40year old man well known to me from inpatient consultation September 2017 regarding complex history of cyclic vomiting syndrome, likely alcohol abuse, question of iron overload previous labs and 2014 MRI scan.   He Milady Mtz does take Zofran with relief when the nausea acts up. This helps. Milady Mtz is managing to keep up with his calories. He takes a daily protein shake. Weights copied below have been ?stable -- may have been 10 pounds heavier last autumn.     We had Mr Suzanna Fan was evaluated by Dr. Baylee Mendenhall in the ED on 12/28/2017 after that day's phone call. He was complaining of several weeks of intermittent vomiting and diarrhea. No abdominal pain beyond cramping.   Actively drinking as per Dr. Andrey Mata note.  Suad lepe In office today, the patient reports that he has had occasional nausea and 2 episodes of vomiting in the last month but denies cyclical vomiting similar to what he had previously.   Denies difficulties with swallowing, dyspepsia.     His symptoms now are mo Pertinent Family Hx:  - No family hx of esophageal, gastric or colon cancer  - No family history of IBD.     Prior endoscopies:  2014 EGD performed by Dr. Nydia Patel, per patient: Minimal inflammation, pathology report in [facility] describes unremarkable esopha 2.  Hereditary hemochromatosis: Previously followed with Dr. Sherren Rasher was told by a previous provider (he is not certain which one) that he did not require phlebotomy therapy. Amish Gar has not followed up since then.      3.   Hyperbilirubinemia     4 Donated blood occasionally until about 10 years ago. Employment became too busy to allow blood donation past 10 years.   No therapeutic phlebotomy has been performed.     Presented to the Emergency Department here 8:30 PM 9/1/2017 complaining of severe wea No history of marijuana use.     EGD examination was performed December 2014 by Dr. Jannie Kee, minimal inflammation per patient's recollection.   Path report in [facility] describes unremarkable esophagus and duodenal biopsies.        Wt Readings from Last 20 En Current Outpatient Prescriptions:  Dicyclomine HCl 10 MG Oral Cap Take 20 mg by mouth as needed. Disp:  Rfl:    Metoclopramide HCl 10 MG Oral Tab Take 10 mg by mouth as needed.  Disp:  Rfl:    metRONIDAZOLE 500 MG Oral Tab Take 1 tablet (500 mg total) by 4.  Question of hemochromatosis. 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 Milo Vizcaino of hematology. Mr Elissa Atkins was previously a blood donor, but states it is been at least 8 years since he last donated.   Has never

## 2018-03-09 ENCOUNTER — OFFICE VISIT (OUTPATIENT)
Dept: GASTROENTEROLOGY | Facility: CLINIC | Age: 38
End: 2018-03-09

## 2018-03-09 ENCOUNTER — APPOINTMENT (OUTPATIENT)
Dept: LAB | Facility: HOSPITAL | Age: 38
End: 2018-03-09
Attending: INTERNAL MEDICINE
Payer: COMMERCIAL

## 2018-03-09 VITALS
SYSTOLIC BLOOD PRESSURE: 128 MMHG | BODY MASS INDEX: 20.05 KG/M2 | HEART RATE: 93 BPM | DIASTOLIC BLOOD PRESSURE: 82 MMHG | HEIGHT: 72 IN | WEIGHT: 148 LBS

## 2018-03-09 DIAGNOSIS — R79.89 ABNORMAL LFTS (LIVER FUNCTION TESTS): Primary | ICD-10-CM

## 2018-03-09 DIAGNOSIS — R11.15 INTRACTABLE CYCLICAL VOMITING WITH NAUSEA: ICD-10-CM

## 2018-03-09 DIAGNOSIS — A04.72 C. DIFFICILE DIARRHEA: ICD-10-CM

## 2018-03-09 DIAGNOSIS — R79.89 ABNORMAL LFTS (LIVER FUNCTION TESTS): ICD-10-CM

## 2018-03-09 DIAGNOSIS — F10.10 ALCOHOL ABUSE: ICD-10-CM

## 2018-03-09 LAB
ALBUMIN SERPL BCP-MCNC: 4.1 G/DL (ref 3.5–4.8)
ALBUMIN/GLOB SERPL: 1.3 {RATIO} (ref 1–2)
ALP SERPL-CCNC: 38 U/L (ref 32–100)
ALT SERPL-CCNC: 16 U/L (ref 17–63)
ANION GAP SERPL CALC-SCNC: 10 MMOL/L (ref 0–18)
AST SERPL-CCNC: 32 U/L (ref 15–41)
BILIRUB SERPL-MCNC: 1.2 MG/DL (ref 0.3–1.2)
BUN SERPL-MCNC: 6 MG/DL (ref 8–20)
BUN/CREAT SERPL: 8.1 (ref 10–20)
CALCIUM SERPL-MCNC: 9.5 MG/DL (ref 8.5–10.5)
CHLORIDE SERPL-SCNC: 106 MMOL/L (ref 95–110)
CO2 SERPL-SCNC: 25 MMOL/L (ref 22–32)
CREAT SERPL-MCNC: 0.74 MG/DL (ref 0.5–1.5)
FERRITIN SERPL IA-MCNC: 385 NG/ML (ref 24–336)
GLOBULIN PLAS-MCNC: 3.2 G/DL (ref 2.5–3.7)
GLUCOSE SERPL-MCNC: 87 MG/DL (ref 70–99)
IRON SATN MFR SERPL: 35 % (ref 20–55)
IRON SERPL-MCNC: 98 MCG/DL (ref 45–182)
OSMOLALITY UR CALC.SUM OF ELEC: 289 MOSM/KG (ref 275–295)
PATIENT FASTING: YES
POTASSIUM SERPL-SCNC: 4.4 MMOL/L (ref 3.3–5.1)
PROT SERPL-MCNC: 7.3 G/DL (ref 5.9–8.4)
SODIUM SERPL-SCNC: 141 MMOL/L (ref 136–144)
TIBC SERPL-MCNC: 281 MCG/DL (ref 228–428)
TRANSFERRIN SERPL-MCNC: 213 MG/DL (ref 180–329)

## 2018-03-09 PROCEDURE — 82728 ASSAY OF FERRITIN: CPT

## 2018-03-09 PROCEDURE — 99214 OFFICE O/P EST MOD 30 MIN: CPT | Performed by: INTERNAL MEDICINE

## 2018-03-09 PROCEDURE — 80053 COMPREHEN METABOLIC PANEL: CPT

## 2018-03-09 PROCEDURE — 84466 ASSAY OF TRANSFERRIN: CPT

## 2018-03-09 PROCEDURE — 36415 COLL VENOUS BLD VENIPUNCTURE: CPT

## 2018-03-09 PROCEDURE — 99212 OFFICE O/P EST SF 10 MIN: CPT | Performed by: INTERNAL MEDICINE

## 2018-03-09 PROCEDURE — 83540 ASSAY OF IRON: CPT

## 2018-03-09 NOTE — PROGRESS NOTES
HPI:    Patient ID: Verito Watts returns today for follow-up. Cadence Kim is feeling much better, appetite better after treating the C. difficile in late January as below.   He took the full course of metronidazole, apparently no difficulty with nausea or a 1.  New diagnosis C. difficile. Not previously a concern; Milady Mtz does not believe he has previously had this infection. No recalled outpatient antibiotics past year.   He was given at least 2 doses of Zosyn antibiotic during the admission of early Septemb We had previously discussed preventative medications for cyclic vomiting including coenzyme Q 10 and today amitriptyline.   Celso Cabrera is not interested in taking a daily preventative medication, especially anything that could have psychiatric, CNS, mood side e Labs were significant for urine glucose greater than 500, positive urine ketones;   total bilirubin 3.2. Reassuring renal function with creatinine 0.93, potassium 3.9.   Reassuring evaluation including abdominal exam in the Emergency Departme His symptoms now are more in the lower abdominal region. He complains of bilateral lower abdominal cramping. He also reports that his first bowel movement of the day is typically \"normal\".   It gradually progresses over the day to loose but formed stool 2014 EGD performed by Dr. Conor Johnson, per patient: Minimal inflammation, pathology report in [facility] describes unremarkable esophagus/duodenal biopsies     Social Hx:  + Former cigar smoker  + current smokeless tobacco user   + etoh - 3 drinks hard liquor   - Orders Placed This Encounter      Clostridium difficile(toxigenic)PCR      Ova and Parasites Non-traveler Giardia + Crypto Antigen, Stool      Stool Culture W/ Shigatoxin     Meds This Visit:     No prescriptions requested or ordered in this encounter     Presented to the Emergency Department here 8:30 PM 9/1/2017 complaining of severe weakness, nausea, vomiting. Found to be tachycardic and hypotensive. Concern for dehydration.   Found to have acidosis with CO2 16, lactic acid level 6.7, acute kidney failu EGD examination was performed December 2014 by Dr. Danis Avalos, minimal inflammation per patient's recollection.   Path report in [facility] describes unremarkable esophagus and duodenal biopsies.        Wt Readings from Last 20 Encounters:  09/03/17 : 162 lb 1.6 Dicyclomine HCl 10 MG Oral Cap Take 20 mg by mouth as needed. Disp:  Rfl:    Naproxen Sodium (ALEVE OR) Take by mouth. Disp:  Rfl:    famoTIDine 20 MG Oral Tab Take 20 mg by mouth daily as needed.    Disp:  Rfl:    Metoclopramide HCl 10 MG Oral Tab Take 1 4.  Question of hemochromatosis. 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 Kaz Mobley of hematology. Mr Suzanna Fan was previously a blood donor, but states it is been at least 8 years since he last donated.   Has never

## 2018-03-14 PROBLEM — R11.15 INTRACTABLE CYCLICAL VOMITING WITH NAUSEA: Status: ACTIVE | Noted: 2018-03-14

## 2018-04-13 ENCOUNTER — HOSPITAL ENCOUNTER (OUTPATIENT)
Age: 38
Discharge: HOME | End: 2018-04-13
Attending: PEDIATRICS
Payer: COMMERCIAL

## 2018-04-13 VITALS
SYSTOLIC BLOOD PRESSURE: 151 MMHG | DIASTOLIC BLOOD PRESSURE: 99 MMHG | RESPIRATION RATE: 20 BRPM | TEMPERATURE: 99 F | WEIGHT: 145 LBS | HEART RATE: 98 BPM | BODY MASS INDEX: 20.3 KG/M2 | HEIGHT: 71 IN | OXYGEN SATURATION: 100 %

## 2018-04-13 DIAGNOSIS — J01.90 ACUTE SINUSITIS, RECURRENCE NOT SPECIFIED, UNSPECIFIED LOCATION: Primary | ICD-10-CM

## 2018-04-13 PROCEDURE — 99213 OFFICE O/P EST LOW 20 MIN: CPT

## 2018-04-13 PROCEDURE — 99214 OFFICE O/P EST MOD 30 MIN: CPT

## 2018-04-13 RX ORDER — AMOXICILLIN AND CLAVULANATE POTASSIUM 875; 125 MG/1; MG/1
1 TABLET, FILM COATED ORAL 2 TIMES DAILY
Qty: 20 TABLET | Refills: 0 | Status: SHIPPED | OUTPATIENT
Start: 2018-04-13 | End: 2018-04-23

## 2018-04-13 NOTE — ED INITIAL ASSESSMENT (HPI)
Patient complains of congestion and sinus infection for 2 weeks now. Feels pressure under eyes and complains of headaches. Denies sore throat and fevers. Took aleve last dose yesterday. Denies CP and SOB.  Patient states  His arthritis is acting up and back

## 2018-04-13 NOTE — ED PROVIDER NOTES
Patient presents with:  Cough/URI      HPI:     Pricila Rosenthal is a 40year old male who presents for evaluation of a chief complaint of upper respiratory symptoms for 2-3 weeks. Patient has had a cough, congestion, postnasal drip, headache.   He said he h AVS for detailed discharge instructions for your condition today.     Follow Up with:  Sofi Ortega MD  [facility]  [phone]    Schedule an appointment as soon as possible for a visit in 3 days

## 2018-05-10 ENCOUNTER — OFFICE VISIT (OUTPATIENT)
Dept: NEUROLOGY | Facility: CLINIC | Age: 38
End: 2018-05-10

## 2018-05-10 ENCOUNTER — TELEPHONE (OUTPATIENT)
Dept: NEUROLOGY | Facility: CLINIC | Age: 38
End: 2018-05-10

## 2018-05-10 VITALS
HEIGHT: 72 IN | SYSTOLIC BLOOD PRESSURE: 108 MMHG | BODY MASS INDEX: 19.64 KG/M2 | HEART RATE: 75 BPM | WEIGHT: 145 LBS | DIASTOLIC BLOOD PRESSURE: 74 MMHG

## 2018-05-10 DIAGNOSIS — M54.12 CERVICAL RADICULOPATHY: Primary | ICD-10-CM

## 2018-05-10 DIAGNOSIS — R51.9 HEADACHE DISORDER: ICD-10-CM

## 2018-05-10 PROCEDURE — 99204 OFFICE O/P NEW MOD 45 MIN: CPT | Performed by: OTHER

## 2018-05-10 NOTE — PROGRESS NOTES
Mr Gulshan Scales was in the office with his wife. I reviewed epic records, previous neurology evaluation, MRI scan of the cervical spine. In the past he had physical therapy at [facility] which resolved the cervical spine right upper extremity pain.   He has not be rhinitis    • Arthritis    • Back pain    • Back problem    • C. difficile diarrhea 7370   • Cyclical vomiting    • Hemochromatosis    • Hyperbilirubinemia    • Osteoarthritis    • Pneumonia due to organism       Past Surgical History:  No date: DENTAL MAXIMO Rate and Rhythm  GI: + Bowel sounds, soft. Resp: Clear, no Wheezes  Extremities: NO edema, no erythema. Neuro:  Higher Integrative Functions:  Alert, Oriented 3, Fluent, conversational, repetition and naming intact. Short Term Memory intact.  Can recite Metoclopramide HCl 10 MG Oral Tab Take 10 mg by mouth as needed. Disp:  Rfl:    Ondansetron HCl (ZOFRAN) 4 mg tablet Take 1 tablet (4 mg total) by mouth every 8 (eight) hours as needed for Nausea.  Disp: 30 tablet Rfl: 5   Coenzyme Q10 (COQ-10) 200 MG Ora

## 2018-05-10 NOTE — TELEPHONE ENCOUNTER
Kim Carmona for Authorization of Approval for CT Brain, Authorization [id number] valid until 08/08/2018  Will call patient to inform of the Approval, L/M for patient to call scheduling @ [phone]

## 2018-05-11 ENCOUNTER — TELEPHONE (OUTPATIENT)
Dept: GASTROENTEROLOGY | Facility: CLINIC | Age: 38
End: 2018-05-11

## 2018-05-11 NOTE — TELEPHONE ENCOUNTER
Spoke with Aury Pardo and I will mailed letter to patient notifying him of overdue labs (CBC with differential with Platelet ) Ordered 9/58/55  and due 6/19/18. Overdue letter mailed to patient.

## 2018-05-12 ENCOUNTER — APPOINTMENT (OUTPATIENT)
Dept: LAB | Facility: HOSPITAL | Age: 38
End: 2018-05-12
Attending: NURSE PRACTITIONER
Payer: COMMERCIAL

## 2018-05-12 DIAGNOSIS — R79.89 ELEVATED LFTS: ICD-10-CM

## 2018-05-12 DIAGNOSIS — E83.110 HEREDITARY HEMOCHROMATOSIS (HCC): ICD-10-CM

## 2018-05-12 PROCEDURE — 85025 COMPLETE CBC W/AUTO DIFF WBC: CPT

## 2018-05-12 PROCEDURE — 82728 ASSAY OF FERRITIN: CPT

## 2018-05-12 PROCEDURE — 36415 COLL VENOUS BLD VENIPUNCTURE: CPT

## 2018-05-21 ENCOUNTER — TELEPHONE (OUTPATIENT)
Dept: NEUROLOGY | Facility: CLINIC | Age: 38
End: 2018-05-21

## 2018-05-21 DIAGNOSIS — R79.89 ELEVATED FERRITIN: Primary | ICD-10-CM

## 2018-05-22 ENCOUNTER — HOSPITAL ENCOUNTER (OUTPATIENT)
Dept: CT IMAGING | Facility: HOSPITAL | Age: 38
Discharge: HOME | End: 2018-05-22
Attending: Other
Payer: COMMERCIAL

## 2018-05-22 DIAGNOSIS — R51.9 HEADACHE DISORDER: ICD-10-CM

## 2018-05-22 DIAGNOSIS — M54.12 CERVICAL RADICULOPATHY: ICD-10-CM

## 2018-05-22 PROCEDURE — 70450 CT HEAD/BRAIN W/O DYE: CPT | Performed by: OTHER

## 2018-05-22 NOTE — TELEPHONE ENCOUNTER
Please ask him to try ibuprofen, Advil, over-the-counter, 400 mg p.o. twice daily with food.   Thank you

## 2018-06-07 ENCOUNTER — OFFICE VISIT (OUTPATIENT)
Dept: PHYSICAL THERAPY | Facility: HOSPITAL | Age: 38
End: 2018-06-07
Payer: COMMERCIAL

## 2018-06-07 DIAGNOSIS — M54.12 CERVICAL RADICULOPATHY: ICD-10-CM

## 2018-06-07 PROCEDURE — 97162 PT EVAL MOD COMPLEX 30 MIN: CPT

## 2018-06-07 PROCEDURE — 97530 THERAPEUTIC ACTIVITIES: CPT

## 2018-06-07 NOTE — PROGRESS NOTES
CERVICAL SPINE EVALUATION:   Referring Physician: Silvio Owens MD    Date of Onset: 3-4 months ago  Date of Service: 6/7/18   Diagnosis: Cervical radiculopathy (M54.12)   SUBJECTIVE:   PATIENT SUMMARY:  Josefina Howard is a 40year old y/o male who pre lower legs. Patient demos decreased cervical AROM, decreased B scap strength, decreased thoracic mobility, and poor posture. These deficits are contributing to difficulty with working on the computer, turning head, lifting, biking, and sleeping.    Ramandeep lepe 40 min     In agreement with evaluation findings and clinical rationale, this evaluation involved Moderate Complexity decision making due to 3+ personal factors/comorbidities, 3 body structures involved/activity limitations, and evolving symptoms including

## 2018-06-11 ENCOUNTER — OFFICE VISIT (OUTPATIENT)
Dept: GASTROENTEROLOGY | Facility: CLINIC | Age: 38
End: 2018-06-11

## 2018-06-11 VITALS
DIASTOLIC BLOOD PRESSURE: 86 MMHG | BODY MASS INDEX: 20.61 KG/M2 | SYSTOLIC BLOOD PRESSURE: 134 MMHG | HEIGHT: 72 IN | WEIGHT: 152.19 LBS | HEART RATE: 104 BPM

## 2018-06-11 DIAGNOSIS — R11.15 INTRACTABLE CYCLICAL VOMITING WITH NAUSEA: Primary | ICD-10-CM

## 2018-06-11 PROCEDURE — 99214 OFFICE O/P EST MOD 30 MIN: CPT | Performed by: INTERNAL MEDICINE

## 2018-06-11 PROCEDURE — 99212 OFFICE O/P EST SF 10 MIN: CPT | Performed by: INTERNAL MEDICINE

## 2018-06-11 NOTE — PROGRESS NOTES
HPI:    Patient ID: Vale Prior returns today for follow-up. He comes in looking bright upbeat and very healthy. Smiling. He has gained some weight. Ongoing concerns with his neck and back pain.   Just starting physical therapy for his neck and b Stressful work as a . He works in finance. Recent death of a great uncle. Fernando Maldonado is driving up to [place] for the  this afternoon. No full vomiting episodes since Labor Day and before that late May early 2017.   Fernando Maldonado There are 2 concerns here, a frequent nausea, hypersensitivity to food odors as previously described causing a very poor appetite, difficulty keeping up with caloric intake; and rarely the full cyclic vomiting syndrome.   As per inpatient notes, Diane Alberto -Cdiff positive, d/w patient he continues to have diarrhea, but non-toxic symptoms. No fevers. No sig ab pain.  -Will start flagyl 500mg TID x14 days  -Encouraged careful handwashing and isolating himself to one toilet at home. Use bleach to wipe surfaces. Patient is seen in office follow-up. He is familiar to me from a previous office visit on 10/5/2017. At that time he was seen for a hospital follow-up due to cyclical nausea and vomiting and severe weakness.   Patient was followed by Dr. Arlene Silva while Debra Zelaya Patient states he did not follow-up with Jessica Richardson per our last office visit. He states he received a phone call but does not answer his phone very much due to possible scam calls. Unfortunately, he has continued to drink alcohol.   He has cut back some 1.  Nausea/vomiting/alcohol abuse/altered bowel habits/abdominal cramping: Physical exam unremarkable. Patient has not been taking probiotics and CoQ10 as he was previously. I have encouraged him to restart these.   His acute symptoms may be due to possib Date of Admission:  9/1/2017  Date of Consult:  9/3/2017  PCP: Anahi White  Reason for Consultation:   Intractable vomiting     History of Present Illness:      28-year-old man with history of question of hemochromatosis history in Epic chart; landon Feeling much better today. Has tolerated solid meals all day yesterday and this morning.   Still weak, some abdominal and back pain.     During conversations with Dr. Jeannine Hernadez and others this admission,  Guillermo Mario is describing \"cyclical vomiting\" every 3-6 m · Absolute importance of alcohol cessation discussed to clarify the nature of this vomiting and more importantly the liver disease. Alcohol's effect on ferritin level will make that difficult to interpret.   Liver biopsy similarly will not be helpful in th Physical Exam             ASSESSMENT/PLAN:   No diagnosis found. Ferritin 347 on 5/12/2018.     Labs 3/9/2018:  Normal renal function  Glucose 87  AST 32 ALT 16 alkaline phosphatase 38 total bilirubin 1.2 albumin 4.1  Ferritin 385, serum iron 98, TIBC 28 4.  Question of hemochromatosis. 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 Alejandro Ram of hematology. Mr Oxana Landeros was previously a blood donor, but states it is been at least 8 years since he last donated.   Has never

## 2018-06-11 NOTE — PATIENT INSTRUCTIONS
1. Daily laxative regimen to make you more regular. Goal is 1-2 easy bowel movements per day. Need to take every day or several times per week. Options:    A.   Stool softeners - Colace/docusate - 2-6 pills per day (all at once or 1-2 twice per day)  -

## 2018-06-12 ENCOUNTER — OFFICE VISIT (OUTPATIENT)
Dept: PHYSICAL THERAPY | Facility: HOSPITAL | Age: 38
End: 2018-06-12
Payer: COMMERCIAL

## 2018-06-12 PROCEDURE — 97110 THERAPEUTIC EXERCISES: CPT

## 2018-06-12 NOTE — PROGRESS NOTES
Diagnosis: Cervical radiculopathy  Authorized # of Visits:  2/12 Lb Maldonado)         Next MD visit: none scheduled  Fall Risk: standard         Precautions: n/a           Medication Changes since last visit?: No  Subjective: Patient reports he has only been aw

## 2018-06-14 ENCOUNTER — OFFICE VISIT (OUTPATIENT)
Dept: PHYSICAL THERAPY | Facility: HOSPITAL | Age: 38
End: 2018-06-14
Payer: COMMERCIAL

## 2018-06-14 PROCEDURE — 97110 THERAPEUTIC EXERCISES: CPT

## 2018-06-14 NOTE — PROGRESS NOTES
Diagnosis: Cervical radiculopathy  Authorized # of Visits: 15 Lb Maldonado)         Next MD visit: none scheduled  Fall Risk: standard         Precautions: n/a           Medication Changes since last visit?: No  Subjective: Patient reports \"generalized soreness

## 2018-06-19 ENCOUNTER — OFFICE VISIT (OUTPATIENT)
Dept: PHYSICAL THERAPY | Facility: HOSPITAL | Age: 38
End: 2018-06-19
Payer: COMMERCIAL

## 2018-06-19 PROCEDURE — 97110 THERAPEUTIC EXERCISES: CPT

## 2018-06-19 NOTE — PROGRESS NOTES
Diagnosis: Cervical radiculopathy  Authorized # of Visits: 15 Breonna Mensah)         Next MD visit: none scheduled  Fall Risk: standard         Precautions: n/a           Medication Changes since last visit?: No  Subjective: \"I have slight pain on upper neck and

## 2018-06-21 ENCOUNTER — OFFICE VISIT (OUTPATIENT)
Dept: PHYSICAL THERAPY | Facility: HOSPITAL | Age: 38
End: 2018-06-21
Payer: COMMERCIAL

## 2018-06-21 PROCEDURE — 97110 THERAPEUTIC EXERCISES: CPT

## 2018-06-21 NOTE — PROGRESS NOTES
Diagnosis: Cervical radiculopathy  Authorized # of Visits: 15 Gladys Burt)         Next MD visit: none scheduled  Fall Risk: standard         Precautions: n/a           Medication Changes since last visit?: No  Subjective: \"I have slight pain on upper neck and with OP 10 x 5 sec hold  Seated scap squeeze 20x5'  Supine SA punches with 5# x 20 (c/o pinching between shoulder blade)  Supine chest press with 5# x 20  Prone scap squeeze 20x5'  Prone T 1 x 10 (c/o pain middle back)  Prone ext 1 x 5 (c/o pain middle hakan

## 2018-06-26 ENCOUNTER — OFFICE VISIT (OUTPATIENT)
Dept: PHYSICAL THERAPY | Facility: HOSPITAL | Age: 38
End: 2018-06-26
Payer: COMMERCIAL

## 2018-06-26 PROCEDURE — 97140 MANUAL THERAPY 1/> REGIONS: CPT

## 2018-06-26 PROCEDURE — 97110 THERAPEUTIC EXERCISES: CPT

## 2018-06-26 NOTE — PROGRESS NOTES
Diagnosis: Cervical radiculopathy  Authorized # of Visits: 15 Kizzy Bryan)         Next MD visit: none scheduled  Fall Risk: standard         Precautions: n/a           Medication Changes since last visit?: No  Subjective: Patient reports he was very stiff and 2x10  Seated thoracic extension over chair 20x  Prone scap retraction 20x5\"  Prone T 2x10  Prone W squeeze 2x10  Narrow rows/sh ext with blue TB x 20 ea  Doorway stretch 4x20\"     Manual:  Gr III thoracic PA mobs x 8 min      Cervical AROM WFL all planes

## 2018-06-28 ENCOUNTER — OFFICE VISIT (OUTPATIENT)
Dept: PHYSICAL THERAPY | Facility: HOSPITAL | Age: 38
End: 2018-06-28
Payer: COMMERCIAL

## 2018-06-28 PROCEDURE — 97110 THERAPEUTIC EXERCISES: CPT

## 2018-06-28 PROCEDURE — 97140 MANUAL THERAPY 1/> REGIONS: CPT

## 2018-06-28 NOTE — PROGRESS NOTES
Diagnosis: Cervical radiculopathy  Authorized # of Visits: 15 Shelley Hernandez         Next MD visit: none scheduled  Fall Risk: standard         Precautions: n/a           Medication Changes since last visit?: No  Subjective: Patient reports the pain at the base o seated C-ret,  Cervical ext with towel  C-spine left rot with OP  HEP:  Reviewed and reinforced         Assessment: Scapular strength slightly improved since initial evaluation, however faulty posture and significant thoracic hypomobility remain.  Patient w

## 2018-07-12 ENCOUNTER — OFFICE VISIT (OUTPATIENT)
Dept: NEUROLOGY | Facility: CLINIC | Age: 38
End: 2018-07-12

## 2018-07-12 VITALS
DIASTOLIC BLOOD PRESSURE: 68 MMHG | WEIGHT: 150 LBS | SYSTOLIC BLOOD PRESSURE: 116 MMHG | HEART RATE: 110 BPM | HEIGHT: 72 IN | BODY MASS INDEX: 20.32 KG/M2

## 2018-07-12 DIAGNOSIS — G62.9 NEUROPATHY: ICD-10-CM

## 2018-07-12 DIAGNOSIS — M54.12 CERVICAL RADICULOPATHY: Primary | ICD-10-CM

## 2018-07-12 DIAGNOSIS — G56.03 BILATERAL CARPAL TUNNEL SYNDROME: ICD-10-CM

## 2018-07-12 PROCEDURE — 99214 OFFICE O/P EST MOD 30 MIN: CPT | Performed by: OTHER

## 2018-07-12 NOTE — PROGRESS NOTES
Physical therapy notes reviewed. Persistent cervical spine pain, episodic paresthesias in the hands, numbness paresthesias from the knees to the ankles. Lumbar spine pain. He denies upper lower extremity radicular pain.   He states he had carpal tunnel s

## 2018-07-17 ENCOUNTER — TELEPHONE (OUTPATIENT)
Dept: NEUROLOGY | Facility: CLINIC | Age: 38
End: 2018-07-17

## 2018-07-17 NOTE — TELEPHONE ENCOUNTER
[employer] for authorization of approval for MRI C-spine wo. Approval was given with Authorization # [id number] effective 07/17/18 to 10/15/18. Will call Pt. To inform. Pt. informed of approval. Transferred call to scheduling for appt.

## 2018-07-18 ENCOUNTER — HOSPITAL ENCOUNTER (OUTPATIENT)
Dept: MRI IMAGING | Facility: HOSPITAL | Age: 38
Discharge: HOME | End: 2018-07-18
Attending: Other
Payer: COMMERCIAL

## 2018-07-18 DIAGNOSIS — M54.12 CERVICAL RADICULOPATHY: ICD-10-CM

## 2018-07-18 PROCEDURE — 72141 MRI NECK SPINE W/O DYE: CPT | Performed by: OTHER

## 2018-07-24 ENCOUNTER — APPOINTMENT (OUTPATIENT)
Dept: ULTRASOUND IMAGING | Facility: HOSPITAL | Age: 38
End: 2018-07-24
Attending: EMERGENCY MEDICINE
Payer: COMMERCIAL

## 2018-07-24 ENCOUNTER — HOSPITAL ENCOUNTER (EMERGENCY)
Facility: HOSPITAL | Age: 38
Discharge: HOME | End: 2018-07-24
Attending: EMERGENCY MEDICINE
Payer: COMMERCIAL

## 2018-07-24 VITALS
HEIGHT: 72 IN | BODY MASS INDEX: 19.64 KG/M2 | RESPIRATION RATE: 16 BRPM | WEIGHT: 145 LBS | OXYGEN SATURATION: 99 % | TEMPERATURE: 98 F | DIASTOLIC BLOOD PRESSURE: 67 MMHG | HEART RATE: 97 BPM | SYSTOLIC BLOOD PRESSURE: 105 MMHG

## 2018-07-24 DIAGNOSIS — N17.9 AKI (ACUTE KIDNEY INJURY) (HCC): ICD-10-CM

## 2018-07-24 DIAGNOSIS — K76.0 HEPATIC STEATOSIS: ICD-10-CM

## 2018-07-24 DIAGNOSIS — R11.15 INTRACTABLE CYCLICAL VOMITING WITH NAUSEA: ICD-10-CM

## 2018-07-24 DIAGNOSIS — E16.2 HYPOGLYCEMIA: Primary | ICD-10-CM

## 2018-07-24 DIAGNOSIS — R79.89 ELEVATED LFTS: ICD-10-CM

## 2018-07-24 LAB
ALBUMIN SERPL BCP-MCNC: 4.6 G/DL (ref 3.5–4.8)
ALP SERPL-CCNC: 43 U/L (ref 32–100)
ALT SERPL-CCNC: 117 U/L (ref 17–63)
ANION GAP SERPL CALC-SCNC: 28 MMOL/L (ref 0–18)
AST SERPL-CCNC: 197 U/L (ref 15–41)
BACTERIA UR QL AUTO: NEGATIVE /HPF
BASOPHILS # BLD: 0 K/UL (ref 0–0.2)
BASOPHILS NFR BLD: 0 %
BILIRUB DIRECT SERPL-MCNC: 0.4 MG/DL (ref 0–0.2)
BILIRUB SERPL-MCNC: 2.3 MG/DL (ref 0.3–1.2)
BILIRUB UR QL: NEGATIVE
BUN SERPL-MCNC: 18 MG/DL (ref 8–20)
BUN/CREAT SERPL: 11.5 (ref 10–20)
CALCIUM SERPL-MCNC: 10.2 MG/DL (ref 8.5–10.5)
CHLORIDE SERPL-SCNC: 101 MMOL/L (ref 95–110)
CLARITY UR: CLEAR
CO2 SERPL-SCNC: 14 MMOL/L (ref 22–32)
COLOR UR: YELLOW
CREAT SERPL-MCNC: 1.56 MG/DL (ref 0.5–1.5)
EOSINOPHIL # BLD: 0 K/UL (ref 0–0.7)
EOSINOPHIL NFR BLD: 0 %
ERYTHROCYTE [DISTWIDTH] IN BLOOD BY AUTOMATED COUNT: 12.7 % (ref 11–15)
GLUCOSE BLDC GLUCOMTR-MCNC: 171 MG/DL (ref 70–99)
GLUCOSE BLDC GLUCOMTR-MCNC: 33 MG/DL (ref 70–99)
GLUCOSE SERPL-MCNC: 45 MG/DL (ref 70–99)
GLUCOSE UR-MCNC: 50 MG/DL
HCT VFR BLD AUTO: 44.9 % (ref 41–52)
HGB BLD-MCNC: 14.6 G/DL (ref 13.5–17.5)
HGB UR QL STRIP.AUTO: NEGATIVE
HYALINE CASTS #/AREA URNS AUTO: 3 /LPF
KETONES UR-MCNC: 80 MG/DL
LEUKOCYTE ESTERASE UR QL STRIP.AUTO: NEGATIVE
LIPASE SERPL-CCNC: 32 U/L (ref 22–51)
LYMPHOCYTES # BLD: 0.7 K/UL (ref 1–4)
LYMPHOCYTES NFR BLD: 6 %
MCH RBC QN AUTO: 33.7 PG (ref 27–32)
MCHC RBC AUTO-ENTMCNC: 32.5 G/DL (ref 32–37)
MCV RBC AUTO: 103.8 FL (ref 80–100)
MONOCYTES # BLD: 0.7 K/UL (ref 0–1)
MONOCYTES NFR BLD: 6 %
NEUTROPHILS # BLD AUTO: 9.3 K/UL (ref 1.8–7.7)
NEUTROPHILS NFR BLD: 87 %
NITRITE UR QL STRIP.AUTO: NEGATIVE
OSMOLALITY UR CALC.SUM OF ELEC: 295 MOSM/KG (ref 275–295)
PH UR: 5 [PH] (ref 5–8)
PLATELET # BLD AUTO: 258 K/UL (ref 140–400)
PMV BLD AUTO: 9.2 FL (ref 7.4–10.3)
POTASSIUM SERPL-SCNC: 5.1 MMOL/L (ref 3.3–5.1)
PROT SERPL-MCNC: 8.4 G/DL (ref 5.9–8.4)
PROT UR-MCNC: NEGATIVE MG/DL
RBC # BLD AUTO: 4.33 M/UL (ref 4.5–5.9)
RBC #/AREA URNS AUTO: 0 /HPF
SODIUM SERPL-SCNC: 143 MMOL/L (ref 136–144)
SP GR UR STRIP: 1.01 (ref 1–1.03)
UROBILINOGEN UR STRIP-ACNC: <2
VIT C UR-MCNC: NEGATIVE MG/DL
WBC # BLD AUTO: 10.7 K/UL (ref 4–11)
WBC #/AREA URNS AUTO: 1 /HPF

## 2018-07-24 PROCEDURE — 82962 GLUCOSE BLOOD TEST: CPT

## 2018-07-24 PROCEDURE — 99284 EMERGENCY DEPT VISIT MOD MDM: CPT

## 2018-07-24 PROCEDURE — 96375 TX/PRO/DX INJ NEW DRUG ADDON: CPT

## 2018-07-24 PROCEDURE — 80048 BASIC METABOLIC PNL TOTAL CA: CPT | Performed by: EMERGENCY MEDICINE

## 2018-07-24 PROCEDURE — 80048 BASIC METABOLIC PNL TOTAL CA: CPT

## 2018-07-24 PROCEDURE — 83690 ASSAY OF LIPASE: CPT | Performed by: EMERGENCY MEDICINE

## 2018-07-24 PROCEDURE — 80076 HEPATIC FUNCTION PANEL: CPT | Performed by: EMERGENCY MEDICINE

## 2018-07-24 PROCEDURE — 96374 THER/PROPH/DIAG INJ IV PUSH: CPT

## 2018-07-24 PROCEDURE — 96361 HYDRATE IV INFUSION ADD-ON: CPT

## 2018-07-24 PROCEDURE — 80307 DRUG TEST PRSMV CHEM ANLYZR: CPT | Performed by: EMERGENCY MEDICINE

## 2018-07-24 PROCEDURE — 85025 COMPLETE CBC W/AUTO DIFF WBC: CPT | Performed by: EMERGENCY MEDICINE

## 2018-07-24 PROCEDURE — 76705 ECHO EXAM OF ABDOMEN: CPT | Performed by: EMERGENCY MEDICINE

## 2018-07-24 PROCEDURE — 85025 COMPLETE CBC W/AUTO DIFF WBC: CPT

## 2018-07-24 RX ORDER — SODIUM CHLORIDE 9 MG/ML
1000 INJECTION, SOLUTION INTRAVENOUS ONCE
Status: COMPLETED | OUTPATIENT
Start: 2018-07-24 | End: 2018-07-24

## 2018-07-24 RX ORDER — DEXTROSE MONOHYDRATE 25 G/50ML
50 INJECTION, SOLUTION INTRAVENOUS ONCE
Status: COMPLETED | OUTPATIENT
Start: 2018-07-24 | End: 2018-07-24

## 2018-07-24 RX ORDER — METOCLOPRAMIDE HYDROCHLORIDE 5 MG/ML
10 INJECTION INTRAMUSCULAR; INTRAVENOUS ONCE
Status: COMPLETED | OUTPATIENT
Start: 2018-07-24 | End: 2018-07-24

## 2018-07-24 RX ORDER — METOCLOPRAMIDE 10 MG/1
10 TABLET ORAL 3 TIMES DAILY PRN
Qty: 20 TABLET | Refills: 0 | Status: SHIPPED | OUTPATIENT
Start: 2018-07-24 | End: 2018-08-23

## 2018-07-24 RX ORDER — ONDANSETRON 2 MG/ML
4 INJECTION INTRAMUSCULAR; INTRAVENOUS ONCE
Status: COMPLETED | OUTPATIENT
Start: 2018-07-24 | End: 2018-07-24

## 2018-07-24 NOTE — ED INITIAL ASSESSMENT (HPI)
Pt states these symptoms have been occurring since the beginning of this month- pt denies seeing MD.

## 2018-07-24 NOTE — ED INITIAL ASSESSMENT (HPI)
Family states pt has been drinking a lot alcohol lately- he had \"3 large drinks yesterday evening. \" Pt with tremors to upper extremities.  Steady gait observed, Pt state ,\"I only had three drinks last night\" when family brings up his alcohol consumption

## 2018-07-24 NOTE — ED NOTES
Discharged home with plan to follow up with PCP as indicated. Alert and interactive. Hemodynamically stable. Agrees with proposed care plan, all questions answered. Ambulates to exit with steady gait.

## 2018-07-24 NOTE — ED NOTES
Pt to the ed with complaints of n/v x1, low blood sugar d/t decrease in appetite. Hx of cyclic vomiting. Last bm 7/22/18 pt reports constipation.

## 2018-07-24 NOTE — ED PROVIDER NOTES
Patient Seen in: [facility] AND [facility] Emergency Department    History   Patient presents with:  Fatigue (constitutional, neurologic)    Stated Complaint: nausea, weakness, fatigue x \"years\"     HPI    50-year-old male with history of cyclical vomiting, he Gastrointestinal: Positive for nausea and vomiting. Negative for abdominal pain and diarrhea. Genitourinary: Negative for dysuria, flank pain and frequency. Musculoskeletal: Negative for back pain. Skin: Negative for rash.    Neurological: Negative normal mood and affect. Nursing note and vitals reviewed. ED Course     Pulse Oximeter:  Pulse oximetry on room air is 100%, indicating adequate oxygenation.     PROCEDURES:  none    DIAGNOSTICS:   Labs:    Recent Results (from the past 24 hour(s 9.2 7.4 - 10.3 fL   Neutrophil % 87 %   Lymphocyte % 6 %   Monocyte % 6 %   Eosinophil % 0 %   Basophil % 0 %   Neutrophil Absolute 9.3 (H) 1.8 - 7.7 K/UL   Lymphocyte Absolute 0.7 (L) 1.0 - 4.0 K/UL   Monocyte Absolute 0.7 0.0 - 1.0 K/UL   Eosinophil Abso Negative   Urobilinogen Urine <2.0 <2.0   Leukocyte Esterase Urine Negative Negative   Ascorbic Acid Urine Negative Negative mg/dL   Squamous Epi.  Cells Few /HPF   Hyaline Casts 3 0 - 5 /LPF   WBC Urine 1 0 - 5 /HPF   RBC URINE 0 0 - 3 /HPF   Bacteria Urin history, performing a physical exam, bedside monitoring of interventions, collecting and interpreting test, and discussion with consultants.       Disposition and Plan     Clinical Impression:  Hypoglycemia  (primary encounter diagnosis)  SETH (acute kidney

## 2018-07-24 NOTE — ED INITIAL ASSESSMENT (HPI)
Pt states sleeping a lot and still feeling tired, nausea, and 3 episodes of vomiting. Denies fevers. Drinking water with no vomiting in triage.

## 2018-07-31 NOTE — PROGRESS NOTES
Patient attended 7 visits (see progress note from last visit on 6/28/18) then followed up with MD. Patient has not called to schedule additional visits and has not been seen in clinic for more than 30 days so will be discharged from PT at this time.

## 2018-08-02 ENCOUNTER — PROCEDURE VISIT (OUTPATIENT)
Dept: NEUROLOGY | Facility: CLINIC | Age: 38
End: 2018-08-02
Payer: COMMERCIAL

## 2018-08-02 VITALS — HEIGHT: 72 IN | WEIGHT: 150 LBS | BODY MASS INDEX: 20.32 KG/M2

## 2018-08-02 DIAGNOSIS — M79.622 PAIN IN BOTH UPPER ARMS: Primary | ICD-10-CM

## 2018-08-02 DIAGNOSIS — R20.0 NUMBNESS: ICD-10-CM

## 2018-08-02 DIAGNOSIS — M79.621 PAIN IN BOTH UPPER ARMS: Primary | ICD-10-CM

## 2018-08-02 PROCEDURE — 95886 MUSC TEST DONE W/N TEST COMP: CPT | Performed by: OTHER

## 2018-08-02 PROCEDURE — 95913 NRV CNDJ TEST 13/> STUDIES: CPT | Performed by: OTHER

## 2018-09-16 ENCOUNTER — HOSPITAL ENCOUNTER (EMERGENCY)
Facility: HOSPITAL | Age: 38
Discharge: HOME | End: 2018-09-17
Attending: EMERGENCY MEDICINE
Payer: COMMERCIAL

## 2018-09-16 DIAGNOSIS — R11.15 NON-INTRACTABLE CYCLICAL VOMITING WITHOUT NAUSEA: Primary | ICD-10-CM

## 2018-09-16 DIAGNOSIS — E86.0 SEVERE DEHYDRATION: ICD-10-CM

## 2018-09-16 LAB
ALBUMIN SERPL BCP-MCNC: 4.7 G/DL (ref 3.5–4.8)
ALP SERPL-CCNC: 43 U/L (ref 32–100)
ALT SERPL-CCNC: 126 U/L (ref 17–63)
ANION GAP SERPL CALC-SCNC: 38 MMOL/L (ref 0–18)
AST SERPL-CCNC: 180 U/L (ref 15–41)
BASOPHILS # BLD: 0.1 K/UL (ref 0–0.2)
BASOPHILS NFR BLD: 1 %
BILIRUB DIRECT SERPL-MCNC: 0.5 MG/DL (ref 0–0.2)
BILIRUB SERPL-MCNC: 3.1 MG/DL (ref 0.3–1.2)
BUN SERPL-MCNC: 23 MG/DL (ref 8–20)
BUN/CREAT SERPL: 10.7 (ref 10–20)
CALCIUM SERPL-MCNC: 10.1 MG/DL (ref 8.5–10.5)
CHLORIDE SERPL-SCNC: 96 MMOL/L (ref 95–110)
CO2 SERPL-SCNC: 8 MMOL/L (ref 22–32)
CREAT SERPL-MCNC: 2.14 MG/DL (ref 0.5–1.5)
EOSINOPHIL # BLD: 0 K/UL (ref 0–0.7)
EOSINOPHIL NFR BLD: 0 %
ERYTHROCYTE [DISTWIDTH] IN BLOOD BY AUTOMATED COUNT: 13.2 % (ref 11–15)
GLUCOSE SERPL-MCNC: 88 MG/DL (ref 70–99)
HCT VFR BLD AUTO: 42.6 % (ref 41–52)
HGB BLD-MCNC: 13.9 G/DL (ref 13.5–17.5)
LIPASE SERPL-CCNC: 16 U/L (ref 22–51)
LYMPHOCYTES # BLD: 0.5 K/UL (ref 1–4)
LYMPHOCYTES NFR BLD: 5 %
MCH RBC QN AUTO: 33.8 PG (ref 27–32)
MCHC RBC AUTO-ENTMCNC: 32.7 G/DL (ref 32–37)
MCV RBC AUTO: 103.6 FL (ref 80–100)
MONOCYTES # BLD: 1 K/UL (ref 0–1)
MONOCYTES NFR BLD: 9 %
NEUTROPHILS # BLD AUTO: 9.7 K/UL (ref 1.8–7.7)
NEUTROPHILS NFR BLD: 86 %
OSMOLALITY UR CALC.SUM OF ELEC: 297 MOSM/KG (ref 275–295)
PLATELET # BLD AUTO: 234 K/UL (ref 140–400)
PMV BLD AUTO: 9.9 FL (ref 7.4–10.3)
POTASSIUM SERPL-SCNC: 5.6 MMOL/L (ref 3.3–5.1)
PROT SERPL-MCNC: 8.5 G/DL (ref 5.9–8.4)
RBC # BLD AUTO: 4.12 M/UL (ref 4.5–5.9)
SODIUM SERPL-SCNC: 142 MMOL/L (ref 136–144)
WBC # BLD AUTO: 11.3 K/UL (ref 4–11)

## 2018-09-16 PROCEDURE — 96361 HYDRATE IV INFUSION ADD-ON: CPT

## 2018-09-16 PROCEDURE — 96372 THER/PROPH/DIAG INJ SC/IM: CPT

## 2018-09-16 PROCEDURE — 96374 THER/PROPH/DIAG INJ IV PUSH: CPT

## 2018-09-16 PROCEDURE — 99285 EMERGENCY DEPT VISIT HI MDM: CPT

## 2018-09-16 PROCEDURE — 96375 TX/PRO/DX INJ NEW DRUG ADDON: CPT

## 2018-09-16 PROCEDURE — 83690 ASSAY OF LIPASE: CPT | Performed by: EMERGENCY MEDICINE

## 2018-09-16 PROCEDURE — 80048 BASIC METABOLIC PNL TOTAL CA: CPT | Performed by: EMERGENCY MEDICINE

## 2018-09-16 PROCEDURE — 80076 HEPATIC FUNCTION PANEL: CPT | Performed by: EMERGENCY MEDICINE

## 2018-09-16 PROCEDURE — 85025 COMPLETE CBC W/AUTO DIFF WBC: CPT | Performed by: EMERGENCY MEDICINE

## 2018-09-16 RX ORDER — ONDANSETRON 2 MG/ML
4 INJECTION INTRAMUSCULAR; INTRAVENOUS ONCE
Status: COMPLETED | OUTPATIENT
Start: 2018-09-16 | End: 2018-09-16

## 2018-09-16 RX ORDER — KETOROLAC TROMETHAMINE 15 MG/ML
15 INJECTION, SOLUTION INTRAMUSCULAR; INTRAVENOUS ONCE
Status: COMPLETED | OUTPATIENT
Start: 2018-09-16 | End: 2018-09-16

## 2018-09-16 RX ORDER — METOCLOPRAMIDE HYDROCHLORIDE 5 MG/ML
10 INJECTION INTRAMUSCULAR; INTRAVENOUS ONCE
Status: COMPLETED | OUTPATIENT
Start: 2018-09-16 | End: 2018-09-16

## 2018-09-16 RX ORDER — ORPHENADRINE CITRATE 30 MG/ML
60 INJECTION INTRAMUSCULAR; INTRAVENOUS ONCE
Status: COMPLETED | OUTPATIENT
Start: 2018-09-16 | End: 2018-09-16

## 2018-09-17 VITALS
DIASTOLIC BLOOD PRESSURE: 71 MMHG | OXYGEN SATURATION: 98 % | BODY MASS INDEX: 20.32 KG/M2 | HEIGHT: 72 IN | HEART RATE: 83 BPM | WEIGHT: 150 LBS | TEMPERATURE: 99 F | SYSTOLIC BLOOD PRESSURE: 105 MMHG | RESPIRATION RATE: 14 BRPM

## 2018-09-17 LAB
ANION GAP SERPL CALC-SCNC: 17 MMOL/L (ref 0–18)
BUN SERPL-MCNC: 22 MG/DL (ref 8–20)
BUN/CREAT SERPL: 12.5 (ref 10–20)
CALCIUM SERPL-MCNC: 8.2 MG/DL (ref 8.5–10.5)
CHLORIDE SERPL-SCNC: 101 MMOL/L (ref 95–110)
CO2 SERPL-SCNC: 20 MMOL/L (ref 22–32)
CREAT SERPL-MCNC: 1.76 MG/DL (ref 0.5–1.5)
GLUCOSE SERPL-MCNC: 209 MG/DL (ref 70–99)
OSMOLALITY UR CALC.SUM OF ELEC: 295 MOSM/KG (ref 275–295)
POTASSIUM SERPL-SCNC: 5.5 MMOL/L (ref 3.3–5.1)
SODIUM SERPL-SCNC: 138 MMOL/L (ref 136–144)

## 2018-09-17 PROCEDURE — 80048 BASIC METABOLIC PNL TOTAL CA: CPT | Performed by: EMERGENCY MEDICINE

## 2018-09-17 RX ORDER — SODIUM CHLORIDE, SODIUM LACTATE, POTASSIUM CHLORIDE, CALCIUM CHLORIDE 600; 310; 30; 20 MG/100ML; MG/100ML; MG/100ML; MG/100ML
INJECTION, SOLUTION INTRAVENOUS
Status: COMPLETED
Start: 2018-09-17 | End: 2018-09-17

## 2018-09-17 NOTE — ED PROVIDER NOTES
Patient Seen in: [facility] Emergency Department    History   Patient presents with:  Vomiting  Abdomen/Flank Pain (GI/)  Back Pain (musculoskeletal)      HPI    The patient presents to the ED complaining of constant vomiting for the past 8 hour comment: Patient does cigars occ. and smokeless tobacco    Substance and Sexual Activity      Alcohol use:  Yes        Alcohol/week: 1.5 oz        Types: 3 Standard drinks or equivalent per week        Comment: daily; 2- 3  GLASSES VODKA; working on cutting Neurological: He is alert and oriented to person, place, and time. Skin: Skin is warm and dry. He is not diaphoretic. Psychiatric: He has a normal mood and affect. His behavior is normal.   Nursing note and vitals reviewed.       ED Course        Labs CBC W/ DIFFERENTIAL[[id number]]          Abnormal            Final result                 Please view results for these tests on the individual orders.    RAINBOW DRAW BLUE   RAINBOW DRAW LAVENDER   RAINBOW DRAW DARK GREEN   RAINBOW DRAW LIGHT GREE this ED evaluation. ED Course: Presents to the ED with symptoms consistent with past cyclical vomiting. Patient started on IV fluids in the ED and given IV Zofran. No vomiting in the ED throughout his ED stay.   Laboratory testing sent which shows azalea 2: 23 AM

## 2018-11-12 ENCOUNTER — APPOINTMENT (OUTPATIENT)
Dept: LAB | Facility: HOSPITAL | Age: 38
End: 2018-11-12
Attending: NURSE PRACTITIONER
Payer: COMMERCIAL

## 2018-11-12 ENCOUNTER — OFFICE VISIT (OUTPATIENT)
Dept: GASTROENTEROLOGY | Facility: CLINIC | Age: 38
End: 2018-11-12
Payer: COMMERCIAL

## 2018-11-12 VITALS
HEIGHT: 72 IN | BODY MASS INDEX: 19.5 KG/M2 | SYSTOLIC BLOOD PRESSURE: 133 MMHG | DIASTOLIC BLOOD PRESSURE: 87 MMHG | WEIGHT: 144 LBS | HEART RATE: 104 BPM

## 2018-11-12 DIAGNOSIS — K70.10 ALCOHOLIC HEPATITIS WITHOUT ASCITES: ICD-10-CM

## 2018-11-12 DIAGNOSIS — R79.89 ELEVATED FERRITIN: ICD-10-CM

## 2018-11-12 DIAGNOSIS — R79.89 ABNORMAL LFTS (LIVER FUNCTION TESTS): ICD-10-CM

## 2018-11-12 DIAGNOSIS — E83.19 IRON OVERLOAD: ICD-10-CM

## 2018-11-12 DIAGNOSIS — R11.15 INTRACTABLE CYCLICAL VOMITING WITH NAUSEA: Primary | ICD-10-CM

## 2018-11-12 PROCEDURE — 99212 OFFICE O/P EST SF 10 MIN: CPT | Performed by: INTERNAL MEDICINE

## 2018-11-12 PROCEDURE — 36415 COLL VENOUS BLD VENIPUNCTURE: CPT

## 2018-11-12 PROCEDURE — 82728 ASSAY OF FERRITIN: CPT

## 2018-11-12 PROCEDURE — 80076 HEPATIC FUNCTION PANEL: CPT

## 2018-11-12 PROCEDURE — 99214 OFFICE O/P EST MOD 30 MIN: CPT | Performed by: INTERNAL MEDICINE

## 2018-11-12 RX ORDER — METOCLOPRAMIDE 10 MG/1
TABLET ORAL EVERY 6 HOURS PRN
Qty: 40 TABLET | Refills: 2 | Status: ON HOLD | OUTPATIENT
Start: 2018-11-12 | End: 2020-07-30

## 2018-11-12 NOTE — PROGRESS NOTES
HPI:    Patient ID: Andrew Root returns today for follow-up. Milady Mtz comes in today for follow-up describing increased recent vomiting activity. As below, 2 recent Emergency Room visits 7/24/2018 and 9/16/2018 for intractable vomiting.   The episode The patient presents to the ED complaining of constant vomiting for the past 8 hours. He states that he has a history of cyclical vomiting and had recurrence of symptoms today. Ongoing vomiting without significant nausea.   Patient now feels severely dehy Recent constipation past several weeks, now resolving. This is a new concern. No previous discussion of constipation. No full vomiting episodes since Labor Day and before that late May early June 2017.     Ongoing alcohol consumption, still better than No full vomiting episodes since Labor Day and before that late May early June 2017. Chris Perez describes a vague nausea, sense of imminent gagging or vomiting up into the neck region for the past week as if an episode of vomiting may be coming.   Drinking a gl There are 2 concerns here, a frequent nausea, hypersensitivity to food odors as previously described causing a very poor appetite, difficulty keeping up with caloric intake; and rarely the full cyclic vomiting syndrome.   As per inpatient notes, Wendy Pemberton -Cdiff positive, d/w patient he continues to have diarrhea, but non-toxic symptoms. No fevers. No sig ab pain.  -Will start flagyl 500mg TID x14 days  -Encouraged careful handwashing and isolating himself to one toilet at home. Use bleach to wipe surfaces. Patient is seen in office follow-up. He is familiar to me from a previous office visit on 10/5/2017. At that time he was seen for a hospital follow-up due to cyclical nausea and vomiting and severe weakness.   Patient was followed by Dr. Agusto Covington while Josiane Weber Patient states he did not follow-up with [place] per our last office visit. He states he received a phone call but does not answer his phone very much due to possible scam calls. Unfortunately, he has continued to drink alcohol.   He has cut back some 1.  Nausea/vomiting/alcohol abuse/altered bowel habits/abdominal cramping: Physical exam unremarkable. Patient has not been taking probiotics and CoQ10 as he was previously. I have encouraged him to restart these.   His acute symptoms may be due to possib Date of Admission:  9/1/2017  Date of Consult:  9/3/2017  PCP: Valerie Lechuga  Reason for Consultation:   Intractable vomiting     History of Present Illness:      44-year-old man with history of question of hemochromatosis history in Epic chart; landon Feeling much better today. Has tolerated solid meals all day yesterday and this morning.   Still weak, some abdominal and back pain.     During conversations with Dr. Aury Booth and others this admission, Mr Reggie Arguelles is describing \"cyclical vomiting\" every 3-6 m · Absolute importance of alcohol cessation discussed to clarify the nature of this vomiting and more importantly the liver disease. Alcohol's effect on ferritin level will make that difficult to interpret.   Liver biopsy similarly will not be helpful in th GALLBLADDER:            Normal size. No calculi, wall thickening or pericholecystic fluid. Sonographic Rush's sign was not elicited. BILE DUCTS:    Normal.  Common bile duct measures 3 mm. PANCREAS:      The visualized pancreas is unremarkable.   OT 41year old gentleman with complex history of daily alcohol consumption, previous concern for alcohol abuse; cyclic vomiting syndrome; abnormal iron studies in the setting of excessive alcohol consumption.     Previous testing has raised the question of hem 1. Coenzyme q10 (OTC) 200mg twice daily, could go up 300mg twice daily  2. L-Carnitine 3000mg or 3g twice daily  3.  Stronger/more side effects/prescription:  Amitriptyline 50mg per day  ( in severe cases we later add a blood pressure med called Propranolol

## 2018-11-12 NOTE — PATIENT INSTRUCTIONS
.  Ideas from Dr Devonte Raymond:  Preventative DAILY meds/supplements to prevent the cyclic vomitin. Coenzyme q10 (OTC) 200mg twice daily, could go up 300mg twice daily  2. L-Carnitine 3000mg or 3g twice daily  3.  Stronger/more side effects/prescription:  Ami

## 2018-11-21 ENCOUNTER — TELEPHONE (OUTPATIENT)
Dept: GASTROENTEROLOGY | Facility: CLINIC | Age: 38
End: 2018-11-21

## 2018-11-21 NOTE — TELEPHONE ENCOUNTER
Lab letter mailed to pt per Dr.CB Tashia Ansari MD  [facility] Clinical Staff             GI RNs - 1.  Please print and mail this letter to patient

## 2018-12-17 ENCOUNTER — HOSPITAL ENCOUNTER (OUTPATIENT)
Age: 38
Discharge: HOME | End: 2018-12-17
Attending: FAMILY MEDICINE
Payer: COMMERCIAL

## 2018-12-17 VITALS
HEART RATE: 86 BPM | DIASTOLIC BLOOD PRESSURE: 90 MMHG | SYSTOLIC BLOOD PRESSURE: 141 MMHG | BODY MASS INDEX: 17.32 KG/M2 | TEMPERATURE: 98 F | WEIGHT: 135 LBS | RESPIRATION RATE: 16 BRPM | OXYGEN SATURATION: 100 % | HEIGHT: 74 IN

## 2018-12-17 DIAGNOSIS — J01.90 ACUTE RHINOSINUSITIS: Primary | ICD-10-CM

## 2018-12-17 PROCEDURE — 99214 OFFICE O/P EST MOD 30 MIN: CPT

## 2018-12-17 PROCEDURE — 99213 OFFICE O/P EST LOW 20 MIN: CPT

## 2018-12-17 RX ORDER — PREDNISONE 20 MG/1
20 TABLET ORAL DAILY
Qty: 5 TABLET | Refills: 0 | Status: SHIPPED | OUTPATIENT
Start: 2018-12-17 | End: 2018-12-22

## 2018-12-17 RX ORDER — AZELASTINE 1 MG/ML
2 SPRAY, METERED NASAL 2 TIMES DAILY
Qty: 1 BOTTLE | Refills: 0 | Status: SHIPPED | OUTPATIENT
Start: 2018-12-17 | End: 2019-01-16

## 2018-12-17 NOTE — ED PROVIDER NOTES
Patient presents with:  Cough/URI      HPI:     Andrew Root is a 41year old male who presents with for chief complaint of nasal congestion, sinus congestion, postnasal drip   On and off since September. Patient has seasonal allergies and uses Nasonex. Resp 16   Ht 188 cm (6' 2\")   Wt 61.2 kg   SpO2 100%   BMI 17.33 kg/m²   General Examination:  alert, well hydrated. No acute distress. HEENT -   Head: Normocephalic, without obvious abnormality, atraumatic  Eyes: conjunctivae/corneas clear.  PERRL, EOM

## 2018-12-17 NOTE — ED INITIAL ASSESSMENT (HPI)
Pt presents to the IC with c/o sinus congestion, headache. No cough or sore throat. Pt notes post-nasal drip. Pt with a hx of sinus infections. Denies fevers.

## 2019-01-14 ENCOUNTER — OFFICE VISIT (OUTPATIENT)
Dept: OTOLARYNGOLOGY | Facility: CLINIC | Age: 39
End: 2019-01-14
Payer: COMMERCIAL

## 2019-01-14 VITALS — SYSTOLIC BLOOD PRESSURE: 116 MMHG | HEART RATE: 121 BPM | DIASTOLIC BLOOD PRESSURE: 86 MMHG

## 2019-01-14 DIAGNOSIS — J34.2 NASAL SEPTAL DEVIATION: ICD-10-CM

## 2019-01-14 DIAGNOSIS — J01.41 ACUTE RECURRENT PANSINUSITIS: Primary | ICD-10-CM

## 2019-01-14 PROCEDURE — 99212 OFFICE O/P EST SF 10 MIN: CPT | Performed by: OTOLARYNGOLOGY

## 2019-01-14 PROCEDURE — 99204 OFFICE O/P NEW MOD 45 MIN: CPT | Performed by: OTOLARYNGOLOGY

## 2019-01-14 RX ORDER — AMOXICILLIN 500 MG/1
500 CAPSULE ORAL 3 TIMES DAILY
Qty: 21 CAPSULE | Refills: 0 | Status: SHIPPED | OUTPATIENT
Start: 2019-01-14 | End: 2019-01-21

## 2019-01-14 NOTE — PROGRESS NOTES
Therese Rosas is a 41year old male.   Patient presents with:  Sinus Problem: per pt has had sinus infection since december, pt mucus, congestion, pt was prescribed nasal spray but no antibiotic due to hx of cdiff       HISTORY OF PRESENT ILLNESS  1/14/201 Diagnosis Date   • Allergic rhinitis    • Arthritis    • Back pain    • Back problem    • C. difficile diarrhea 5079   • Cyclical vomiting    • Hemochromatosis    • Hyperbilirubinemia    • Osteoarthritis    • Pneumonia due to organism        Past Surgica Anterior cervical. Posterior cervical. Supraclavicular. Nose/Mouth/Throat abNormal Nares - Right: Normal Left: Normal. Septum deviated 100% obstructing the right nasal cavity with turbinate hypertrophy bilaterally mucosa congestion.  Polyps are what steps I need to take to try and avoid C. difficile.,  Recommend follow-up scan after his CAT scan  - CT SINUS (CPT=70486); Future    2. Nasal septal deviation  I discussed the planned surgery in detail with the patient.  Septal repair is done in outpat

## 2019-01-16 ENCOUNTER — TELEPHONE (OUTPATIENT)
Dept: OTOLARYNGOLOGY | Facility: CLINIC | Age: 39
End: 2019-01-16

## 2019-01-16 NOTE — TELEPHONE ENCOUNTER
Called [employer], [phone], spoke with Kristine, a prior authorization is required through [place], reference number [id number].  Ethan, [phone], spoke with Constance Horton, case number [id number] for CT sinus has been approved, authorization number

## 2019-01-21 ENCOUNTER — HOSPITAL ENCOUNTER (OUTPATIENT)
Dept: CT IMAGING | Facility: HOSPITAL | Age: 39
Discharge: HOME | End: 2019-01-21
Attending: OTOLARYNGOLOGY
Payer: COMMERCIAL

## 2019-01-21 DIAGNOSIS — J01.41 ACUTE RECURRENT PANSINUSITIS: ICD-10-CM

## 2019-01-21 PROCEDURE — 70486 CT MAXILLOFACIAL W/O DYE: CPT | Performed by: OTOLARYNGOLOGY

## 2019-03-11 ENCOUNTER — APPOINTMENT (OUTPATIENT)
Dept: LAB | Facility: HOSPITAL | Age: 39
End: 2019-03-11
Attending: INTERNAL MEDICINE

## 2019-03-11 ENCOUNTER — OFFICE VISIT (OUTPATIENT)
Dept: GASTROENTEROLOGY | Facility: CLINIC | Age: 39
End: 2019-03-11
Payer: COMMERCIAL

## 2019-03-11 VITALS
SYSTOLIC BLOOD PRESSURE: 134 MMHG | DIASTOLIC BLOOD PRESSURE: 88 MMHG | WEIGHT: 137.81 LBS | HEART RATE: 92 BPM | BODY MASS INDEX: 18.67 KG/M2 | HEIGHT: 72 IN

## 2019-03-11 DIAGNOSIS — R11.15 INTRACTABLE CYCLICAL VOMITING WITH NAUSEA: ICD-10-CM

## 2019-03-11 DIAGNOSIS — R79.89 ABNORMAL LFTS (LIVER FUNCTION TESTS): Primary | ICD-10-CM

## 2019-03-11 DIAGNOSIS — R63.4 ABNORMAL WEIGHT LOSS: ICD-10-CM

## 2019-03-11 DIAGNOSIS — E83.19 IRON OVERLOAD: ICD-10-CM

## 2019-03-11 DIAGNOSIS — R79.89 ABNORMAL LFTS (LIVER FUNCTION TESTS): ICD-10-CM

## 2019-03-11 LAB
ALBUMIN SERPL-MCNC: 3.5 G/DL (ref 3.4–5)
ALBUMIN/GLOB SERPL: 0.9 {RATIO} (ref 1–2)
ALP LIVER SERPL-CCNC: 69 U/L (ref 45–117)
ALT SERPL-CCNC: 114 U/L (ref 16–61)
ANION GAP SERPL CALC-SCNC: 9 MMOL/L (ref 0–18)
AST SERPL-CCNC: 397 U/L (ref 15–37)
BILIRUB SERPL-MCNC: 1.4 MG/DL (ref 0.1–2)
BUN BLD-MCNC: 8 MG/DL (ref 7–18)
BUN/CREAT SERPL: 10.1 (ref 10–20)
CALCIUM BLD-MCNC: 9.8 MG/DL (ref 8.5–10.1)
CHLORIDE SERPL-SCNC: 106 MMOL/L (ref 98–107)
CO2 SERPL-SCNC: 27 MMOL/L (ref 21–32)
CREAT BLD-MCNC: 0.79 MG/DL (ref 0.7–1.3)
GLOBULIN PLAS-MCNC: 4.1 G/DL (ref 2.8–4.4)
GLUCOSE BLD-MCNC: 96 MG/DL (ref 70–99)
IGA SERPL-MCNC: 342 MG/DL (ref 70–312)
INR BLD: 1.02 (ref 0.9–1.2)
M PROTEIN MFR SERPL ELPH: 7.6 G/DL (ref 6.4–8.2)
OSMOLALITY SERPL CALC.SUM OF ELEC: 292 MOSM/KG (ref 275–295)
POTASSIUM SERPL-SCNC: 4 MMOL/L (ref 3.5–5.1)
PROTHROMBIN TIME: 13.2 SECONDS (ref 11.8–14.5)
SODIUM SERPL-SCNC: 142 MMOL/L (ref 136–145)

## 2019-03-11 PROCEDURE — 83516 IMMUNOASSAY NONANTIBODY: CPT

## 2019-03-11 PROCEDURE — 99212 OFFICE O/P EST SF 10 MIN: CPT | Performed by: INTERNAL MEDICINE

## 2019-03-11 PROCEDURE — 99214 OFFICE O/P EST MOD 30 MIN: CPT | Performed by: INTERNAL MEDICINE

## 2019-03-11 PROCEDURE — 80053 COMPREHEN METABOLIC PANEL: CPT

## 2019-03-11 PROCEDURE — 36415 COLL VENOUS BLD VENIPUNCTURE: CPT

## 2019-03-11 PROCEDURE — 85610 PROTHROMBIN TIME: CPT

## 2019-03-11 PROCEDURE — 82784 ASSAY IGA/IGD/IGG/IGM EACH: CPT

## 2019-03-11 NOTE — PROGRESS NOTES
HPI:    Patient ID: Sree Aiken returns today for follow-up. Overall, things are about the same. A bit less stress at home, Marcell's wife is doing better.   Her cardiac testing has been completed and apparently her next follow-up will be January 2020 11/25/14 : 145 lb (65.8 kg)  11/04/14 : 145 lb 3.2 oz (65.9 kg)       ====================  Previous visit 11/12/2018:    Pamela Castañedach returns today for follow-up. Fito Hodgson comes in today for follow-up describing increased recent vomiting activity.     A The patient presents to the ED complaining of constant vomiting for the past 8 hours. He states that he has a history of cyclical vomiting and had recurrence of symptoms today. Ongoing vomiting without significant nausea.   Patient now feels severely dehy Recent constipation past several weeks, now resolving. This is a new concern. No previous discussion of constipation. No full vomiting episodes since Labor Day and before that late May early June 2017.     Ongoing alcohol consumption, still better than No full vomiting episodes since Labor Day and before that late May early June 2017. Aury Edvin describes a vague nausea, sense of imminent gagging or vomiting up into the neck region for the past week as if an episode of vomiting may be coming.   Drinking a gl There are 2 concerns here, a frequent nausea, hypersensitivity to food odors as previously described causing a very poor appetite, difficulty keeping up with caloric intake; and rarely the full cyclic vomiting syndrome.   As per inpatient notes, Dwight Lopez -Cdiff positive, d/w patient he continues to have diarrhea, but non-toxic symptoms. No fevers. No sig ab pain.  -Will start flagyl 500mg TID x14 days  -Encouraged careful handwashing and isolating himself to one toilet at home. Use bleach to wipe surfaces. Patient is seen in office follow-up. He is familiar to me from a previous office visit on 10/5/2017. At that time he was seen for a hospital follow-up due to cyclical nausea and vomiting and severe weakness.   Patient was followed by Dr. Marshall Lopez while Priscilla Brown Patient states he did not follow-up with Gaston Gillette per our last office visit. He states he received a phone call but does not answer his phone very much due to possible scam calls. Unfortunately, he has continued to drink alcohol.   He has cut back some 1.  Nausea/vomiting/alcohol abuse/altered bowel habits/abdominal cramping: Physical exam unremarkable. Patient has not been taking probiotics and CoQ10 as he was previously. I have encouraged him to restart these.   His acute symptoms may be due to possib Date of Admission:  9/1/2017  Date of Consult:  9/3/2017  PCP: Yuliya Gunter  Reason for Consultation:   Intractable vomiting     History of Present Illness:      41-year-old man with history of question of hemochromatosis history in Epic chart; landon Feeling much better today. Has tolerated solid meals all day yesterday and this morning.   Still weak, some abdominal and back pain.     During conversations with Dr. Hannah Blackwood and others this admission,  Laila Dent is describing \"cyclical vomiting\" every 3-6 m · Absolute importance of alcohol cessation discussed to clarify the nature of this vomiting and more importantly the liver disease. Alcohol's effect on ferritin level will make that difficult to interpret.   Liver biopsy similarly will not be helpful in th GALLBLADDER:            Normal size. No calculi, wall thickening or pericholecystic fluid. Sonographic Rush's sign was not elicited. BILE DUCTS:    Normal.  Common bile duct measures 3 mm. PANCREAS:      The visualized pancreas is unremarkable.   OT Labs 12/28/2017 show abrupt normalization of hemoglobin back to 15.3 g; ongoing elevations LFTs with normal renal function,   total bilirubin 3.2 and albumin 3.6. Last similar elevation was labs of 9/1/2017.     Impression:    41year old gen Continue Zofran tablets, Compazine suppositories as needed for the nausea. New Rx sent in 1/22/2018. Alcohol cessation again discussed at length in detail 3/11/2019.     Weight loss, abnormal LFTs: previous duodenal biopsies were normal 12/12014; [facility] Over 25 minutes spent today discussing the above and counseling this patient. More than 50% at that time was spent in counseling. No orders of the defined types were placed in this encounter.       Meds This Visit:  Requested Prescriptions      No pr

## 2019-03-13 LAB — TTG IGA SER-ACNC: 0.7 U/ML (ref ?–7)

## 2019-04-10 ENCOUNTER — OFFICE VISIT (OUTPATIENT)
Dept: INTERNAL MEDICINE CLINIC | Facility: CLINIC | Age: 39
End: 2019-04-10
Payer: COMMERCIAL

## 2019-04-10 VITALS
OXYGEN SATURATION: 99 % | DIASTOLIC BLOOD PRESSURE: 94 MMHG | BODY MASS INDEX: 18.96 KG/M2 | RESPIRATION RATE: 20 BRPM | WEIGHT: 140 LBS | TEMPERATURE: 99 F | HEART RATE: 93 BPM | SYSTOLIC BLOOD PRESSURE: 122 MMHG | HEIGHT: 72 IN

## 2019-04-10 DIAGNOSIS — E83.119 HEMOCHROMATOSIS, UNSPECIFIED HEMOCHROMATOSIS TYPE: ICD-10-CM

## 2019-04-10 DIAGNOSIS — R03.0 ELEVATED BLOOD PRESSURE READING: ICD-10-CM

## 2019-04-10 DIAGNOSIS — E55.9 VITAMIN D DEFICIENCY: ICD-10-CM

## 2019-04-10 DIAGNOSIS — R63.6 UNDERWEIGHT: ICD-10-CM

## 2019-04-10 DIAGNOSIS — J31.0 CHRONIC RHINITIS: Primary | ICD-10-CM

## 2019-04-10 DIAGNOSIS — M48.02 CERVICAL SPINAL STENOSIS: ICD-10-CM

## 2019-04-10 DIAGNOSIS — E80.6 HYPERBILIRUBINEMIA: ICD-10-CM

## 2019-04-10 DIAGNOSIS — Z87.898 HISTORY OF PERIPHERAL EDEMA: ICD-10-CM

## 2019-04-10 DIAGNOSIS — K21.9 GASTROESOPHAGEAL REFLUX DISEASE, ESOPHAGITIS PRESENCE NOT SPECIFIED: ICD-10-CM

## 2019-04-10 DIAGNOSIS — R00.0 TACHYCARDIA: ICD-10-CM

## 2019-04-10 DIAGNOSIS — K76.0 FATTY LIVER: ICD-10-CM

## 2019-04-10 DIAGNOSIS — T51.91XA TOXIC EFFECT OF ALCOHOL, UNINTENTIONAL, INITIAL ENCOUNTER: ICD-10-CM

## 2019-04-10 DIAGNOSIS — Z86.69 HISTORY OF NEUROPATHY: ICD-10-CM

## 2019-04-10 PROCEDURE — 81003 URINALYSIS AUTO W/O SCOPE: CPT | Performed by: INTERNAL MEDICINE

## 2019-04-10 PROCEDURE — 80050 GENERAL HEALTH PANEL: CPT | Performed by: INTERNAL MEDICINE

## 2019-04-10 PROCEDURE — 84439 ASSAY OF FREE THYROXINE: CPT | Performed by: INTERNAL MEDICINE

## 2019-04-10 PROCEDURE — 85610 PROTHROMBIN TIME: CPT | Performed by: INTERNAL MEDICINE

## 2019-04-10 PROCEDURE — 84466 ASSAY OF TRANSFERRIN: CPT | Performed by: INTERNAL MEDICINE

## 2019-04-10 PROCEDURE — 83540 ASSAY OF IRON: CPT | Performed by: INTERNAL MEDICINE

## 2019-04-10 PROCEDURE — 80061 LIPID PANEL: CPT | Performed by: INTERNAL MEDICINE

## 2019-04-10 PROCEDURE — 87086 URINE CULTURE/COLONY COUNT: CPT | Performed by: INTERNAL MEDICINE

## 2019-04-10 PROCEDURE — 82746 ASSAY OF FOLIC ACID SERUM: CPT | Performed by: INTERNAL MEDICINE

## 2019-04-10 PROCEDURE — 82607 VITAMIN B-12: CPT | Performed by: INTERNAL MEDICINE

## 2019-04-10 PROCEDURE — 36415 COLL VENOUS BLD VENIPUNCTURE: CPT | Performed by: INTERNAL MEDICINE

## 2019-04-10 PROCEDURE — 82728 ASSAY OF FERRITIN: CPT | Performed by: INTERNAL MEDICINE

## 2019-04-10 PROCEDURE — 82306 VITAMIN D 25 HYDROXY: CPT | Performed by: INTERNAL MEDICINE

## 2019-04-10 PROCEDURE — 85730 THROMBOPLASTIN TIME PARTIAL: CPT | Performed by: INTERNAL MEDICINE

## 2019-04-10 PROCEDURE — 99204 OFFICE O/P NEW MOD 45 MIN: CPT | Performed by: INTERNAL MEDICINE

## 2019-04-10 RX ORDER — AZELASTINE HYDROCHLORIDE, FLUTICASONE PROPIONATE 137; 50 UG/1; UG/1
1 SPRAY, METERED NASAL 2 TIMES DAILY PRN
Qty: 1 BOTTLE | Refills: 0 | Status: SHIPPED | OUTPATIENT
Start: 2019-04-10 | End: 2019-05-08

## 2019-04-10 RX ORDER — FLUTICASONE PROPIONATE 50 MCG
2 SPRAY, SUSPENSION (ML) NASAL DAILY
COMMUNITY
End: 2019-05-29

## 2019-04-10 NOTE — PROGRESS NOTES
Karan Joshi is a 41year old male.   Patient presents with:  Establish Care: new pt presents to clinic to establish care       HPI:         Constant nasal drainage, mostly clear; OK in morning, worse as day progresses, worse at work leeanne has 2 cats and into the vein. Disp:  Rfl:    Metoclopramide HCl 10 MG Oral Tab Take 1-2 tablets (10-20 mg total) by mouth every 6 (six) hours as needed. Disp: 40 tablet Rfl: 2   ibuprofen 100 MG Oral Tab Take 100 mg by mouth as needed for Fever.  Disp:  Rfl:    Tank unusual joint pain, myalgias  SKIN: denies rash or lesions  NEUROLOGICAL: denies frequent headaches  HEME: denies excessive bruising or bleeding  PSYCH: denies depression or anxiety    Physical Exam:   04/10/19  0803 04/10/19  0924   BP: (!) 130/94 (!) 122 Esterase Urine negative Negative    APPEARANCE clear Clear    Color Urine orange Yellow    Multistix Lot# [id number] Numeric    Multistix Expiration Date 8/2,019 Date          Assessment/Plan:    (J31.0) Chronic rhinitis  (primary encounter diagnosis)  Plan: sac,   C5-6 moderate spinal stenosis  Plan:  Previous EMG normal    (E80.6) Hyperbilirubinemia  Plan: Very elevated transaminases with normal viral hepatitis profile few years ago.   Patient advised regarding this, strongly advised to decrease alcohol gradu face with warm soaks twice daily  Nasal saline  Eat regularly      Follow-up 1 week  Greater than 1 hour spent  Regarding this patient, 55 minutes with visit and also 20 minutes in review of previous records        Shayna Morris MD

## 2019-04-10 NOTE — PATIENT INSTRUCTIONS
Salan pas patch to back of neck at night  Bathe face with warm soaks twice daily  Nasal saline  Eat regularly

## 2019-04-10 NOTE — PROGRESS NOTES
Pt presented to clinic today for blood draw. Per physician able to draw orders. Orders  documented within chart. Pt tolerated lab draw well.  verified.   Orders drawn include: PT, PTT, lipid, free t4, tsh, vit d, vit Q41, folic, cmp, cbc, ferritin, iron

## 2019-04-24 ENCOUNTER — OFFICE VISIT (OUTPATIENT)
Dept: INTERNAL MEDICINE CLINIC | Facility: CLINIC | Age: 39
End: 2019-04-24
Payer: COMMERCIAL

## 2019-04-24 VITALS
HEART RATE: 94 BPM | RESPIRATION RATE: 20 BRPM | WEIGHT: 142 LBS | HEIGHT: 72 IN | BODY MASS INDEX: 19.23 KG/M2 | DIASTOLIC BLOOD PRESSURE: 82 MMHG | OXYGEN SATURATION: 98 % | SYSTOLIC BLOOD PRESSURE: 122 MMHG | TEMPERATURE: 98 F

## 2019-04-24 DIAGNOSIS — K21.9 GASTROESOPHAGEAL REFLUX DISEASE, ESOPHAGITIS PRESENCE NOT SPECIFIED: ICD-10-CM

## 2019-04-24 DIAGNOSIS — Z86.69 HISTORY OF NEUROPATHY: ICD-10-CM

## 2019-04-24 DIAGNOSIS — E87.6 HYPOKALEMIA: ICD-10-CM

## 2019-04-24 DIAGNOSIS — J34.9 SINUS DISORDER: ICD-10-CM

## 2019-04-24 DIAGNOSIS — K76.0 FATTY LIVER: ICD-10-CM

## 2019-04-24 DIAGNOSIS — E16.2 HYPOGLYCEMIA: ICD-10-CM

## 2019-04-24 DIAGNOSIS — E55.9 VITAMIN D DEFICIENCY: ICD-10-CM

## 2019-04-24 DIAGNOSIS — E53.8 FOLATE DEFICIENCY: ICD-10-CM

## 2019-04-24 DIAGNOSIS — E80.6 HYPERBILIRUBINEMIA: ICD-10-CM

## 2019-04-24 DIAGNOSIS — R79.89 ELEVATED FERRITIN LEVEL: Primary | ICD-10-CM

## 2019-04-24 DIAGNOSIS — Z72.89 ALCOHOL USE: ICD-10-CM

## 2019-04-24 PROCEDURE — 84466 ASSAY OF TRANSFERRIN: CPT | Performed by: INTERNAL MEDICINE

## 2019-04-24 PROCEDURE — 84425 ASSAY OF VITAMIN B-1: CPT | Performed by: INTERNAL MEDICINE

## 2019-04-24 PROCEDURE — 36415 COLL VENOUS BLD VENIPUNCTURE: CPT | Performed by: INTERNAL MEDICINE

## 2019-04-24 PROCEDURE — 99214 OFFICE O/P EST MOD 30 MIN: CPT | Performed by: INTERNAL MEDICINE

## 2019-04-24 RX ORDER — NICOTINE POLACRILEX 4 MG/1
1 GUM, CHEWING ORAL
Qty: 90 TABLET | Refills: 0 | Status: SHIPPED | OUTPATIENT
Start: 2019-04-24 | End: 2019-07-17

## 2019-04-24 RX ORDER — AZITHROMYCIN 250 MG/1
TABLET, FILM COATED ORAL
Qty: 6 TABLET | Refills: 0 | Status: SHIPPED | OUTPATIENT
Start: 2019-04-24 | End: 2019-05-08 | Stop reason: ALTCHOICE

## 2019-04-24 NOTE — PROGRESS NOTES
Pt presented to clinic today for blood draw. Per physician able to draw orders. Orders  documented within chart. Pt tolerated lab draw well.  verified.   Orders drawn include: vit b1, transferrin   Site of draw: rt shaun Jackson CMA

## 2019-04-24 NOTE — PROGRESS NOTES
Freddie Bravo is a 41year old male.   Patient presents with:  Test Results: pt presents to clinic for follow up on test results       HPI:          Bilat maxillary sinuses bother him, with pressure maxillae, slight drainage usu clear, rare white or  Yel Diagnosis Date   • Allergic rhinitis    • Arthritis    • Back pain    • Back problem    • C. difficile diarrhea 8827   • Cyclical vomiting    • Hemochromatosis    • Hyperbilirubinemia    • Osteoarthritis    • Pneumonia due to organism       Past Surgical H Abdomen-bowel sounds normal, no organomegaly, no tenderness to palpation. No masses.   Extremities-no cyanosis clubbing or edema    Objectives:  Results for orders placed or performed in visit on 04/24/19   TRANSFERRIN   Result Value Ref Range    Transferr Hypoglycemia (Low Blood Sugar)     Fast-acting sugar includes a cup of nonfat milk. Too little sugar (glucose) in your blood is called hypoglycemia or low blood sugar. Low blood sugar usually means anything lower than 70 mg/dL.  Talk with your healthcare · If your condition needs a strict treatment plan, eat your meals and snacks at the same times each day. Don’t skip meals!   · If your treatment plan lets you change when you eat and what you eat, learn how to change the time and dose of your rapid-acting i

## 2019-05-07 ENCOUNTER — OFFICE VISIT (OUTPATIENT)
Dept: HEMATOLOGY/ONCOLOGY | Facility: HOSPITAL | Age: 39
End: 2019-05-07
Attending: INTERNAL MEDICINE
Payer: COMMERCIAL

## 2019-05-07 ENCOUNTER — LAB ENCOUNTER (OUTPATIENT)
Dept: LAB | Facility: HOSPITAL | Age: 39
End: 2019-05-07
Attending: INTERNAL MEDICINE
Payer: COMMERCIAL

## 2019-05-07 VITALS
DIASTOLIC BLOOD PRESSURE: 106 MMHG | SYSTOLIC BLOOD PRESSURE: 144 MMHG | BODY MASS INDEX: 20.45 KG/M2 | HEART RATE: 88 BPM | HEIGHT: 72 IN | OXYGEN SATURATION: 99 % | RESPIRATION RATE: 16 BRPM | WEIGHT: 151 LBS | TEMPERATURE: 99 F

## 2019-05-07 DIAGNOSIS — R79.89 ELEVATED FERRITIN LEVEL: Primary | ICD-10-CM

## 2019-05-07 DIAGNOSIS — R79.89 ELEVATED FERRITIN LEVEL: ICD-10-CM

## 2019-05-07 PROCEDURE — 83540 ASSAY OF IRON: CPT

## 2019-05-07 PROCEDURE — 82728 ASSAY OF FERRITIN: CPT

## 2019-05-07 PROCEDURE — 36415 COLL VENOUS BLD VENIPUNCTURE: CPT

## 2019-05-07 PROCEDURE — 80053 COMPREHEN METABOLIC PANEL: CPT

## 2019-05-07 PROCEDURE — 99244 OFF/OP CNSLTJ NEW/EST MOD 40: CPT | Performed by: INTERNAL MEDICINE

## 2019-05-07 PROCEDURE — 84466 ASSAY OF TRANSFERRIN: CPT

## 2019-05-07 PROCEDURE — 85025 COMPLETE CBC W/AUTO DIFF WBC: CPT

## 2019-05-07 PROCEDURE — 81256 HFE GENE: CPT

## 2019-05-07 NOTE — PROGRESS NOTES
Hematology Consultation Note    Patient Name: Ivan Kasper   YOB: 1980   Medical Record Number: [id number]   CSN: [id number]   Consulting Physician: Waleska Crain MD  Referring Physician(s):  [address]  Date of Consultation: 5/7/ Cancer Other 68        maternal great uncle; unknown etiology   • Bleeding Disorders Neg    • Clotting Disorder Neg    • Anemia Neg        Social History:  Social History    Socioeconomic History      Marital status:       Spouse name: Not on file Not Asked        Caffeine Concern: Yes          1-2 cup of coffee a day        Occupational Exposure: Not Asked        Hobby Hazards: Not Asked        Sleep Concern: Not Asked        Stress Concern: Not Asked        Weight Concern: Not Asked        Special dyspnea on exertion. Gastrointestinal: Negative for dysphagia, odynophagia, reflux symptoms, nausea, vomiting, change in bowel habits, diarrhea, constipation and abdominal pain. Integument/breast: Negative for rash, skin lesions, and pruritus.   Hematolog 04/10/2019 09:28 AM    BUN 4 (L) 04/10/2019 09:28 AM    CREATSERUM 0.89 04/10/2019 09:28 AM    GFRNAA 109 04/10/2019 09:28 AM    CA 9.3 04/10/2019 09:28 AM    ALB 3.9 04/10/2019 09:28 AM     04/10/2019 09:28 AM    K 3.4 (L) 04/10/2019 09:28 AM    CL higher ferritin values; may need phlebotomy with target ferritin goals of <50      Yeni Garcia MD  [facility]  [address] [id number] [facility], [facility]

## 2019-05-08 ENCOUNTER — NURSE ONLY (OUTPATIENT)
Dept: ALLERGY | Facility: CLINIC | Age: 39
End: 2019-05-08
Payer: COMMERCIAL

## 2019-05-08 ENCOUNTER — OFFICE VISIT (OUTPATIENT)
Dept: ALLERGY | Facility: CLINIC | Age: 39
End: 2019-05-08
Payer: COMMERCIAL

## 2019-05-08 VITALS
HEART RATE: 95 BPM | RESPIRATION RATE: 16 BRPM | SYSTOLIC BLOOD PRESSURE: 113 MMHG | HEIGHT: 72 IN | OXYGEN SATURATION: 99 % | DIASTOLIC BLOOD PRESSURE: 78 MMHG | BODY MASS INDEX: 20.45 KG/M2 | TEMPERATURE: 99 F | WEIGHT: 151 LBS

## 2019-05-08 DIAGNOSIS — J31.0 CHRONIC RHINITIS: ICD-10-CM

## 2019-05-08 DIAGNOSIS — J34.2 NASAL SEPTAL DEVIATION: ICD-10-CM

## 2019-05-08 DIAGNOSIS — J30.89 ENVIRONMENTAL AND SEASONAL ALLERGIES: ICD-10-CM

## 2019-05-08 DIAGNOSIS — J30.89 PERENNIAL ALLERGIC RHINITIS: Primary | ICD-10-CM

## 2019-05-08 PROCEDURE — 95004 PERQ TESTS W/ALRGNC XTRCS: CPT | Performed by: ALLERGY & IMMUNOLOGY

## 2019-05-08 PROCEDURE — 95024 IQ TESTS W/ALLERGENIC XTRCS: CPT | Performed by: ALLERGY & IMMUNOLOGY

## 2019-05-08 PROCEDURE — 99204 OFFICE O/P NEW MOD 45 MIN: CPT | Performed by: ALLERGY & IMMUNOLOGY

## 2019-05-08 PROCEDURE — 99212 OFFICE O/P EST SF 10 MIN: CPT | Performed by: ALLERGY & IMMUNOLOGY

## 2019-05-08 RX ORDER — MONTELUKAST SODIUM 10 MG/1
10 TABLET ORAL NIGHTLY
Qty: 30 TABLET | Refills: 0 | Status: SHIPPED | OUTPATIENT
Start: 2019-05-08 | End: 2019-05-29

## 2019-05-08 RX ORDER — LEVOCETIRIZINE DIHYDROCHLORIDE 5 MG/1
5 TABLET, FILM COATED ORAL NIGHTLY
Qty: 30 TABLET | Refills: 0 | Status: SHIPPED | OUTPATIENT
Start: 2019-05-08 | End: 2019-05-29

## 2019-05-08 NOTE — PATIENT INSTRUCTIONS
Recs:  Handouts on allergic rhinitis and nonallergic rhinitis provided and reviewed including structural rhinitis  Dymista 1 spray per nostril twice a day  Start Xyzal, levocetirizine 5 mg once a night at bedtime as an antihistamine  Add Singulair, montelu

## 2019-05-08 NOTE — PROGRESS NOTES
Jim Booth is a 41year old male. HPI:   Patient presents with:  Sinus Problem: Post-nasal drip leading to gagging and vomiting. This started several years ago. Also c/o sinus h/a's and pressure.      Patient is a 67-year-old male who presents for 10.0      Smokeless tobacco: Current User        Types: Chew      Tobacco comment: Wife smokes, some passive exposure. Alcohol use:  Yes      Alcohol/week: 1.5 oz      Types: 3 Standard drinks or equivalent per week      Comment: daily; 2- 3  GLASSES VO Negative for pruritus and rash  Musculoskeletal:  Negative for joint symptoms  Neurological:  Negative for dizziness, seizures  Psychiatric:  Negative for inappropriate interaction and psychiatric symptoms  Respiratory:  Negative for cough, dyspnea and wh encounter       Imaging & Referrals:  None     5/8/2019  Claire Madison MD      If medication samples were provided today, they were provided solely for patient education and training related to self administration of these medications.   Teaching, instr

## 2019-05-10 RX ORDER — AZELASTINE HYDROCHLORIDE, FLUTICASONE PROPIONATE 137; 50 UG/1; UG/1
1 SPRAY, METERED NASAL 2 TIMES DAILY PRN
Qty: 1 BOTTLE | Refills: 0 | Status: SHIPPED | OUTPATIENT
Start: 2019-05-10 | End: 2019-06-13

## 2019-05-14 ENCOUNTER — OFFICE VISIT (OUTPATIENT)
Dept: HEMATOLOGY/ONCOLOGY | Facility: HOSPITAL | Age: 39
End: 2019-05-14
Attending: INTERNAL MEDICINE
Payer: COMMERCIAL

## 2019-05-14 VITALS
TEMPERATURE: 100 F | WEIGHT: 146 LBS | HEIGHT: 72 IN | RESPIRATION RATE: 16 BRPM | DIASTOLIC BLOOD PRESSURE: 95 MMHG | OXYGEN SATURATION: 100 % | SYSTOLIC BLOOD PRESSURE: 146 MMHG | HEART RATE: 63 BPM | BODY MASS INDEX: 19.77 KG/M2

## 2019-05-14 DIAGNOSIS — E83.110 HEREDITARY HEMOCHROMATOSIS (HCC): Primary | ICD-10-CM

## 2019-05-14 PROCEDURE — 99214 OFFICE O/P EST MOD 30 MIN: CPT | Performed by: INTERNAL MEDICINE

## 2019-05-14 NOTE — PROGRESS NOTES
Hematology Progress Note    Patient Name: Pricila Rosenthal   YOB: 1980   Medical Record Number: [id number]   CSN: [id number]   Consulting Physician: Rigoberto Palacio MD  Referring Physician(s):  Alexa Harrington  Date of Visit: 5/14/2019     ADDI Hemochromatosis    • Hyperbilirubinemia    • Osteoarthritis    • Pneumonia due to organism        Past Surgical History:  Past Surgical History:   Procedure Laterality Date   • DENTAL SURGERY PROCEDURE     • EGD     • EYE SURGERY  2012   • INTRAOCULAR LENS Not on file        Relationship status: Not on file      Intimate partner violence:        Fear of current or ex partner: Not on file        Emotionally abused: Not on file        Physically abused: Not on file        Forced sexual activity: Not on file chills, fevers, night sweats and weight loss. +fatigue  Eyes: Negative for visual disturbance, irritation and redness. Respiratory: Negative for cough, hemoptysis, chest pain, or dyspnea on exertion.   Gastrointestinal: Negative for dysphagia, odynophagia, MCHC 34.7 05/07/2019 05:32 PM    RDW 11.7 05/07/2019 05:32 PM    NEPRELIM 2.98 05/07/2019 05:32 PM    .0 05/07/2019 05:32 PM       Lab Results   Component Value Date/Time    GLU 88 05/07/2019 05:32 PM    BUN 5 (L) 05/07/2019 05:32 PM    CREATSERUM 0 Holland Alvares, [address] [place] [address] [facility]  [address] [facility]

## 2019-05-27 ENCOUNTER — PATIENT MESSAGE (OUTPATIENT)
Dept: GASTROENTEROLOGY | Facility: CLINIC | Age: 39
End: 2019-05-27

## 2019-05-28 ENCOUNTER — TELEPHONE (OUTPATIENT)
Dept: GASTROENTEROLOGY | Facility: CLINIC | Age: 39
End: 2019-05-28

## 2019-05-28 ENCOUNTER — LAB ENCOUNTER (OUTPATIENT)
Dept: LAB | Facility: HOSPITAL | Age: 39
End: 2019-05-28
Attending: INTERNAL MEDICINE
Payer: COMMERCIAL

## 2019-05-28 DIAGNOSIS — Z86.19 HISTORY OF CLOSTRIDIUM DIFFICILE COLITIS: Primary | ICD-10-CM

## 2019-05-28 DIAGNOSIS — K92.1 BLOOD IN STOOL: ICD-10-CM

## 2019-05-28 PROCEDURE — 85025 COMPLETE CBC W/AUTO DIFF WBC: CPT

## 2019-05-28 PROCEDURE — 36415 COLL VENOUS BLD VENIPUNCTURE: CPT

## 2019-05-28 NOTE — TELEPHONE ENCOUNTER
----- Message from Hilda Warner sent at 5/27/2019  8:49 PM CDT -----  Regarding: Non-Urgent Medical Question  Contact: [phone]  Dr. Ralph Singh, for about the last week, I've noticed a non insignifigant amount of blood in my stool.  I've not really been

## 2019-05-28 NOTE — TELEPHONE ENCOUNTER
From: Karan Joshi  To: Nadia Thakur MD  Sent: 5/27/2019 8:49 PM CDT  Subject: Non-Urgent Medical Question    Dr. Agusto Covington, for about the last week, I've noticed a non insignifigant amount of blood in my stool.  I've not really been having much

## 2019-05-28 NOTE — TELEPHONE ENCOUNTER
Thanks Beatriz Farah for catching this. This is actually 2 issues:    Please call Mr Elissa Atkins with the followin. Question of C diff -- Please call Mr. Elissa Atkins to ask him to come in for the C. difficile test ordered today. 2.  Seeing blood with stool.   Us

## 2019-05-28 NOTE — TELEPHONE ENCOUNTER
I spoke to the pt. He was notified of Dr Brantley's recommendation's and f/u instructions below. He verbalizes understanding.     He will go for his stool test and blood work today

## 2019-05-29 ENCOUNTER — APPOINTMENT (OUTPATIENT)
Dept: LAB | Facility: HOSPITAL | Age: 39
End: 2019-05-29
Attending: INTERNAL MEDICINE
Payer: COMMERCIAL

## 2019-05-29 ENCOUNTER — TELEPHONE (OUTPATIENT)
Dept: ALLERGY | Facility: CLINIC | Age: 39
End: 2019-05-29

## 2019-05-29 ENCOUNTER — OFFICE VISIT (OUTPATIENT)
Dept: ALLERGY | Facility: CLINIC | Age: 39
End: 2019-05-29
Payer: COMMERCIAL

## 2019-05-29 VITALS
SYSTOLIC BLOOD PRESSURE: 112 MMHG | DIASTOLIC BLOOD PRESSURE: 86 MMHG | OXYGEN SATURATION: 99 % | TEMPERATURE: 98 F | RESPIRATION RATE: 17 BRPM | HEART RATE: 78 BPM

## 2019-05-29 DIAGNOSIS — J30.89 PERENNIAL ALLERGIC RHINITIS: ICD-10-CM

## 2019-05-29 DIAGNOSIS — J30.89 ENVIRONMENTAL AND SEASONAL ALLERGIES: Primary | ICD-10-CM

## 2019-05-29 DIAGNOSIS — J34.2 NASAL SEPTAL DEVIATION: ICD-10-CM

## 2019-05-29 PROCEDURE — 99212 OFFICE O/P EST SF 10 MIN: CPT | Performed by: ALLERGY & IMMUNOLOGY

## 2019-05-29 PROCEDURE — 87493 C DIFF AMPLIFIED PROBE: CPT | Performed by: INTERNAL MEDICINE

## 2019-05-29 PROCEDURE — 99214 OFFICE O/P EST MOD 30 MIN: CPT | Performed by: ALLERGY & IMMUNOLOGY

## 2019-05-29 RX ORDER — BIOTIN 1 MG
1 TABLET ORAL DAILY
COMMUNITY

## 2019-05-29 RX ORDER — AZELASTINE HYDROCHLORIDE, FLUTICASONE PROPIONATE 137; 50 UG/1; UG/1
1 SPRAY, METERED NASAL 2 TIMES DAILY
Qty: 1 BOTTLE | Refills: 5 | Status: SHIPPED | OUTPATIENT
Start: 2019-05-29 | End: 2019-10-22

## 2019-05-29 RX ORDER — LEVOCETIRIZINE DIHYDROCHLORIDE 5 MG/1
5 TABLET, FILM COATED ORAL NIGHTLY
Qty: 90 TABLET | Refills: 1 | Status: SHIPPED | OUTPATIENT
Start: 2019-05-29 | End: 2019-11-19

## 2019-05-29 RX ORDER — MONTELUKAST SODIUM 10 MG/1
10 TABLET ORAL NIGHTLY
Qty: 90 TABLET | Refills: 1 | Status: SHIPPED | OUTPATIENT
Start: 2019-05-29 | End: 2019-11-19

## 2019-05-29 NOTE — PATIENT INSTRUCTIONS
Recs: Continue with Singulair Dymista and Xyzal  Reviewed allergy avoidance measures and potential treatment option of immunotherapy.   Patient wishes to start immunotherapy this week as a preventative treatment strategy for underlying allergic triggers imm

## 2019-05-29 NOTE — PROGRESS NOTES
Vale Rebollar is a 41year old male. HPI:   Patient presents with: Allergies: Pt reprots his symptoms have improved some, but when he is at work he still notice sinus pressure in face. Feels like \"everthing is compressing in his face\".  Pt reprots he status: Former Smoker        Years: 5.00        Types: Cigars        Quit date: 5/7/2009        Years since quitting: 10.0      Smokeless tobacco: Current User        Types: Chew      Tobacco comment: Wife smokes, some passive exposure.      Alcohol use: Susan Bach pain, edema  Constitutional:  Negative night sweats,weight loss, irritability and lethargy  ENMT:  Negative for ear drainage, hearing loss and nasal drainage  Eyes:  Negative for eye discharge and vision loss  Gastrointestinal:  Negative for abdominal pain defined types were placed in this encounter. Meds This Visit:  Requested Prescriptions     Signed Prescriptions Disp Refills   • Montelukast Sodium (SINGULAIR) 10 MG Oral Tab 90 tablet 1     Sig: Take 1 tablet (10 mg total) by mouth nightly.    • Levoc

## 2019-06-01 ENCOUNTER — NURSE ONLY (OUTPATIENT)
Dept: ALLERGY | Facility: CLINIC | Age: 39
End: 2019-06-01
Payer: COMMERCIAL

## 2019-06-01 DIAGNOSIS — J30.89 ENVIRONMENTAL AND SEASONAL ALLERGIES: ICD-10-CM

## 2019-06-01 PROCEDURE — 95117 IMMUNOTHERAPY INJECTIONS: CPT | Performed by: ALLERGY & IMMUNOLOGY

## 2019-06-01 PROCEDURE — 95165 ANTIGEN THERAPY SERVICES: CPT | Performed by: ALLERGY & IMMUNOLOGY

## 2019-06-03 ENCOUNTER — HOSPITAL ENCOUNTER (EMERGENCY)
Facility: HOSPITAL | Age: 39
Discharge: HOME | End: 2019-06-04
Attending: EMERGENCY MEDICINE
Payer: COMMERCIAL

## 2019-06-03 DIAGNOSIS — M62.830 BACK SPASM: ICD-10-CM

## 2019-06-03 DIAGNOSIS — R11.15 NON-INTRACTABLE CYCLICAL VOMITING WITH NAUSEA: Primary | ICD-10-CM

## 2019-06-03 DIAGNOSIS — E86.0 DEHYDRATION: ICD-10-CM

## 2019-06-03 PROCEDURE — 96375 TX/PRO/DX INJ NEW DRUG ADDON: CPT

## 2019-06-03 PROCEDURE — 99284 EMERGENCY DEPT VISIT MOD MDM: CPT

## 2019-06-03 PROCEDURE — C9113 INJ PANTOPRAZOLE SODIUM, VIA: HCPCS | Performed by: EMERGENCY MEDICINE

## 2019-06-03 PROCEDURE — 80048 BASIC METABOLIC PNL TOTAL CA: CPT | Performed by: EMERGENCY MEDICINE

## 2019-06-03 PROCEDURE — 85060 BLOOD SMEAR INTERPRETATION: CPT | Performed by: EMERGENCY MEDICINE

## 2019-06-03 PROCEDURE — 96361 HYDRATE IV INFUSION ADD-ON: CPT

## 2019-06-03 PROCEDURE — 96374 THER/PROPH/DIAG INJ IV PUSH: CPT

## 2019-06-03 PROCEDURE — 85025 COMPLETE CBC W/AUTO DIFF WBC: CPT | Performed by: EMERGENCY MEDICINE

## 2019-06-03 RX ORDER — ORPHENADRINE CITRATE 30 MG/ML
60 INJECTION INTRAMUSCULAR; INTRAVENOUS ONCE
Status: COMPLETED | OUTPATIENT
Start: 2019-06-03 | End: 2019-06-03

## 2019-06-03 RX ORDER — KETOROLAC TROMETHAMINE 30 MG/ML
30 INJECTION, SOLUTION INTRAMUSCULAR; INTRAVENOUS ONCE
Status: COMPLETED | OUTPATIENT
Start: 2019-06-03 | End: 2019-06-03

## 2019-06-03 RX ORDER — ONDANSETRON 2 MG/ML
4 INJECTION INTRAMUSCULAR; INTRAVENOUS ONCE
Status: COMPLETED | OUTPATIENT
Start: 2019-06-03 | End: 2019-06-03

## 2019-06-03 RX ORDER — METOCLOPRAMIDE HYDROCHLORIDE 5 MG/ML
10 INJECTION INTRAMUSCULAR; INTRAVENOUS ONCE
Status: COMPLETED | OUTPATIENT
Start: 2019-06-03 | End: 2019-06-03

## 2019-06-03 RX ORDER — LORAZEPAM 2 MG/ML
1 INJECTION INTRAMUSCULAR ONCE
Status: COMPLETED | OUTPATIENT
Start: 2019-06-03 | End: 2019-06-03

## 2019-06-04 ENCOUNTER — PATIENT MESSAGE (OUTPATIENT)
Dept: GASTROENTEROLOGY | Facility: CLINIC | Age: 39
End: 2019-06-04

## 2019-06-04 VITALS
TEMPERATURE: 98 F | OXYGEN SATURATION: 97 % | HEIGHT: 72 IN | SYSTOLIC BLOOD PRESSURE: 109 MMHG | RESPIRATION RATE: 18 BRPM | WEIGHT: 142 LBS | BODY MASS INDEX: 19.23 KG/M2 | HEART RATE: 89 BPM | DIASTOLIC BLOOD PRESSURE: 69 MMHG

## 2019-06-04 RX ORDER — ORPHENADRINE CITRATE 100 MG/1
100 TABLET, EXTENDED RELEASE ORAL 2 TIMES DAILY PRN
Qty: 20 TABLET | Refills: 0 | Status: SHIPPED | OUTPATIENT
Start: 2019-06-04 | End: 2019-06-11

## 2019-06-04 NOTE — TELEPHONE ENCOUNTER
From: Holli Mckinnon  To: Elsa Castellanos MD  Sent: 6/4/2019 4:23 PM CDT  Subject: Visit Follow-up Question    Dr Nat Savage,    I was in the ER last night for another bought of [facility], should I come in sooner than our scheduled follow up?     Regards,

## 2019-06-04 NOTE — ED PROVIDER NOTES
Patient Seen in: [facility] AND [facility] Emergency Department    History   Patient presents with:  Nausea/vomiting  Back Pain (musculoskeletal)    Stated Complaint: \"I have cyclic vomiting\"    HPI    41year old male with h/o cyclic vomiting who has multiple Current:/69   Pulse 89   Temp 98.1 °F (36.7 °C)   Resp 18   Ht 182.9 cm (6')   Wt 64.4 kg   SpO2 97%   BMI 19.26 kg/m²         Physical Exam   Constitutional: He is oriented to person, place, and time. He appears well-developed and well-nourished.  He Neutrophil Absolute Prelim 15.90 (*)     Neutrophil Absolute 15.90 (*)     Lymphocyte Absolute 0.96 (*)     Monocyte Absolute 1.30 (*)     All other components within normal limits   CBC WITH DIFFERENTIAL WITH PLATELET    Narrative:      The following orde Medications Prescribed:  Discharge Medication List as of 6/4/2019 12:27 AM    START taking these medications    Orphenadrine Citrate  MG Oral Tablet 12 Hr  Take 100 mg by mouth 2 (two) times daily as needed (muscle spasm).  Do not drive or operate hea

## 2019-06-04 NOTE — ED INITIAL ASSESSMENT (HPI)
Pt comes to ER with c/o vomiting and lower back pain since 2 pm today. Pt states he has cyclical vomiting.

## 2019-06-10 ENCOUNTER — NURSE ONLY (OUTPATIENT)
Dept: ALLERGY | Facility: CLINIC | Age: 39
End: 2019-06-10
Payer: COMMERCIAL

## 2019-06-10 DIAGNOSIS — J30.89 ENVIRONMENTAL AND SEASONAL ALLERGIES: ICD-10-CM

## 2019-06-10 PROCEDURE — 95117 IMMUNOTHERAPY INJECTIONS: CPT | Performed by: ALLERGY & IMMUNOLOGY

## 2019-06-13 ENCOUNTER — OFFICE VISIT (OUTPATIENT)
Dept: INTERNAL MEDICINE CLINIC | Facility: CLINIC | Age: 39
End: 2019-06-13
Payer: COMMERCIAL

## 2019-06-13 VITALS
BODY MASS INDEX: 19.37 KG/M2 | SYSTOLIC BLOOD PRESSURE: 130 MMHG | DIASTOLIC BLOOD PRESSURE: 80 MMHG | WEIGHT: 143 LBS | HEIGHT: 72 IN | RESPIRATION RATE: 20 BRPM | OXYGEN SATURATION: 98 % | TEMPERATURE: 99 F | HEART RATE: 90 BPM

## 2019-06-13 DIAGNOSIS — E83.19 IRON OVERLOAD: ICD-10-CM

## 2019-06-13 DIAGNOSIS — G61.9 INFLAMMATORY NEUROPATHY (HCC): ICD-10-CM

## 2019-06-13 DIAGNOSIS — N28.9 ACUTE RENAL INSUFFICIENCY: ICD-10-CM

## 2019-06-13 DIAGNOSIS — E16.2 HYPOGLYCEMIA: ICD-10-CM

## 2019-06-13 DIAGNOSIS — K21.9 GASTROESOPHAGEAL REFLUX DISEASE, ESOPHAGITIS PRESENCE NOT SPECIFIED: ICD-10-CM

## 2019-06-13 DIAGNOSIS — M48.02 CERVICAL SPINAL STENOSIS: Primary | ICD-10-CM

## 2019-06-13 DIAGNOSIS — T51.91XA TOXIC EFFECT OF ALCOHOL, UNINTENTIONAL, INITIAL ENCOUNTER: ICD-10-CM

## 2019-06-13 DIAGNOSIS — E53.8 FOLATE DEFICIENCY: ICD-10-CM

## 2019-06-13 DIAGNOSIS — R00.0 TACHYCARDIA: ICD-10-CM

## 2019-06-13 DIAGNOSIS — E87.2 ACIDOSIS: ICD-10-CM

## 2019-06-13 DIAGNOSIS — K76.0 FATTY LIVER: ICD-10-CM

## 2019-06-13 DIAGNOSIS — R82.2 BILIRUBINURIA: ICD-10-CM

## 2019-06-13 DIAGNOSIS — E83.119 HEMOCHROMATOSIS, UNSPECIFIED HEMOCHROMATOSIS TYPE: ICD-10-CM

## 2019-06-13 DIAGNOSIS — R82.90 ABNORMAL URINALYSIS: ICD-10-CM

## 2019-06-13 DIAGNOSIS — D72.823 LEUKEMOID REACTION: ICD-10-CM

## 2019-06-13 DIAGNOSIS — E55.9 VITAMIN D DEFICIENCY: ICD-10-CM

## 2019-06-13 PROCEDURE — 83690 ASSAY OF LIPASE: CPT | Performed by: INTERNAL MEDICINE

## 2019-06-13 PROCEDURE — 82746 ASSAY OF FOLIC ACID SERUM: CPT | Performed by: INTERNAL MEDICINE

## 2019-06-13 PROCEDURE — 99215 OFFICE O/P EST HI 40 MIN: CPT | Performed by: INTERNAL MEDICINE

## 2019-06-13 PROCEDURE — 82306 VITAMIN D 25 HYDROXY: CPT | Performed by: INTERNAL MEDICINE

## 2019-06-13 PROCEDURE — 81003 URINALYSIS AUTO W/O SCOPE: CPT | Performed by: INTERNAL MEDICINE

## 2019-06-13 PROCEDURE — 87086 URINE CULTURE/COLONY COUNT: CPT | Performed by: INTERNAL MEDICINE

## 2019-06-13 PROCEDURE — 80050 GENERAL HEALTH PANEL: CPT | Performed by: INTERNAL MEDICINE

## 2019-06-13 PROCEDURE — 36415 COLL VENOUS BLD VENIPUNCTURE: CPT | Performed by: INTERNAL MEDICINE

## 2019-06-13 PROCEDURE — 85730 THROMBOPLASTIN TIME PARTIAL: CPT | Performed by: INTERNAL MEDICINE

## 2019-06-13 PROCEDURE — 85610 PROTHROMBIN TIME: CPT | Performed by: INTERNAL MEDICINE

## 2019-06-13 PROCEDURE — 83735 ASSAY OF MAGNESIUM: CPT | Performed by: INTERNAL MEDICINE

## 2019-06-13 RX ORDER — OMEPRAZOLE 20 MG/1
CAPSULE, DELAYED RELEASE ORAL
Refills: 1 | COMMUNITY
Start: 2019-05-20 | End: 2019-08-14

## 2019-06-13 RX ORDER — CELECOXIB 200 MG/1
200 CAPSULE ORAL DAILY
Qty: 7 CAPSULE | Refills: 0 | Status: SHIPPED | OUTPATIENT
Start: 2019-06-13 | End: 2019-06-16

## 2019-06-13 NOTE — PROGRESS NOTES
Haja Alonzo is a 46year old male. Patient presents with:  Physical: pt in for Annual Physical. needs prevnar 13      HPI:         In lot of pain back and sinuses, tho now ok. . Feels numbness top head and base neck, thinks from arthritis.   He feels Azelastine-Fluticasone (DYMISTA) 137-50 MCG/ACT Nasal Suspension 1 Squirt by Nasal route 2 (two) times daily. Disp: 1 Bottle Rfl: 5   Metoclopramide HCl 10 MG Oral Tab Take 1-2 tablets (10-20 mg total) by mouth every 6 (six) hours as needed.  Disp: 40 tab throat,  change in vision or hearing  CARDIOVASCULAR: denies chest pain, denies palpitations, denies edema  RESPIRATORY:denies cough, denies shortness of breath, denies wheezing  GASTROINTESTINAL: History of cyclic vomiting and recent ER visit for abdomina Ref Range    Glucose 74 70 - 99 mg/dL    Sodium 143 136 - 145 mmol/L    Potassium 3.9 3.5 - 5.1 mmol/L    Chloride 106 98 - 112 mmol/L    CO2 28.0 21.0 - 32.0 mmol/L    Anion Gap 9 0 - 18 mmol/L    BUN 9 7 - 18 mg/dL    Creatinine 0.96 0.70 - 1.30 mg/dL 1.00 x10(3) uL    Eosinophil Absolute 0.05 0.00 - 0.70 x10(3) uL    Basophil Absolute 0.03 0.00 - 0.20 x10(3) uL    Immature Granulocyte Absolute 0.01 0.00 - 1.00 x10(3) uL    Neutrophil % 53.2 %    Lymphocyte % 35.8 %    Monocyte % 8.6 %    Eosinophil % 1 alcohol and other toxins to liver. Patient going to [facility], advised to consider alcohol rehab due to risk of liver toxicity especially with hemochromatosis, nerve symptoms.   Advised cannot stop alcohol suddenly at home , would have to gradually taper Instructions as above.     Follow-up 1 month        Adrien Elliott MD

## 2019-06-13 NOTE — PROGRESS NOTES
Pt presented to clinic today for blood draw. Per physician able to draw orders. Orders  documented within chart. Pt tolerated lab draw well.  verified.   Orders drawn include: lipase, mag, ptt, pt, tsh, vit d, folic, cmp, cbc  Site of draw: rt arm  Steph

## 2019-06-15 ENCOUNTER — TELEPHONE (OUTPATIENT)
Dept: INTERNAL MEDICINE CLINIC | Facility: CLINIC | Age: 39
End: 2019-06-15

## 2019-06-15 PROBLEM — T51.91XA: Status: ACTIVE | Noted: 2019-06-15

## 2019-06-15 PROBLEM — R00.0 TACHYCARDIA: Status: ACTIVE | Noted: 2019-06-15

## 2019-06-15 PROBLEM — M48.02 CERVICAL SPINAL STENOSIS: Status: ACTIVE | Noted: 2019-06-15

## 2019-06-15 PROBLEM — R82.90 ABNORMAL URINALYSIS: Status: ACTIVE | Noted: 2019-06-15

## 2019-06-15 PROBLEM — K76.0 FATTY LIVER: Status: ACTIVE | Noted: 2019-06-15

## 2019-06-15 PROBLEM — K21.9 GASTROESOPHAGEAL REFLUX DISEASE: Status: ACTIVE | Noted: 2019-06-15

## 2019-06-15 PROBLEM — G61.9 INFLAMMATORY NEUROPATHY (HCC): Status: ACTIVE | Noted: 2019-06-15

## 2019-06-15 NOTE — TELEPHONE ENCOUNTER
Called patient to advise him of lab results, including improvement of liver functions, folic acid and vitamin D and electrolytes, especially from ER. Also advised him of low blood sugar, that he should be regularly.   Other instructions as given prior at o

## 2019-06-16 DIAGNOSIS — M48.02 CERVICAL SPINAL STENOSIS: ICD-10-CM

## 2019-06-17 ENCOUNTER — NURSE ONLY (OUTPATIENT)
Dept: ALLERGY | Facility: CLINIC | Age: 39
End: 2019-06-17
Payer: COMMERCIAL

## 2019-06-17 DIAGNOSIS — J30.89 ENVIRONMENTAL AND SEASONAL ALLERGIES: ICD-10-CM

## 2019-06-17 PROCEDURE — 95115 IMMUNOTHERAPY ONE INJECTION: CPT | Performed by: ALLERGY & IMMUNOLOGY

## 2019-06-20 DIAGNOSIS — M48.02 CERVICAL SPINAL STENOSIS: ICD-10-CM

## 2019-06-21 RX ORDER — CELECOXIB 200 MG/1
200 CAPSULE ORAL
Qty: 7 CAPSULE | Refills: 0 | Status: SHIPPED | OUTPATIENT
Start: 2019-06-21 | End: 2019-06-27

## 2019-06-21 RX ORDER — CELECOXIB 200 MG/1
200 CAPSULE ORAL DAILY
Qty: 7 CAPSULE | Refills: 0 | OUTPATIENT
Start: 2019-06-21

## 2019-06-24 ENCOUNTER — NURSE ONLY (OUTPATIENT)
Dept: ALLERGY | Facility: CLINIC | Age: 39
End: 2019-06-24
Payer: COMMERCIAL

## 2019-06-24 DIAGNOSIS — J30.89 ENVIRONMENTAL AND SEASONAL ALLERGIES: ICD-10-CM

## 2019-06-24 PROCEDURE — 95117 IMMUNOTHERAPY INJECTIONS: CPT | Performed by: ALLERGY & IMMUNOLOGY

## 2019-06-26 ENCOUNTER — OFFICE VISIT (OUTPATIENT)
Dept: INTERNAL MEDICINE CLINIC | Facility: CLINIC | Age: 39
End: 2019-06-26
Payer: COMMERCIAL

## 2019-06-26 VITALS
HEIGHT: 72 IN | RESPIRATION RATE: 20 BRPM | DIASTOLIC BLOOD PRESSURE: 80 MMHG | SYSTOLIC BLOOD PRESSURE: 130 MMHG | OXYGEN SATURATION: 99 % | TEMPERATURE: 98 F | BODY MASS INDEX: 19.37 KG/M2 | HEART RATE: 99 BPM | WEIGHT: 143 LBS

## 2019-06-26 DIAGNOSIS — D70.8 OTHER NEUTROPENIA (HCC): ICD-10-CM

## 2019-06-26 DIAGNOSIS — E83.119 HEMOCHROMATOSIS, UNSPECIFIED HEMOCHROMATOSIS TYPE: ICD-10-CM

## 2019-06-26 DIAGNOSIS — M48.02 CERVICAL SPINAL STENOSIS: Primary | ICD-10-CM

## 2019-06-26 DIAGNOSIS — K76.0 FATTY LIVER: ICD-10-CM

## 2019-06-26 DIAGNOSIS — Z72.89 ALCOHOL USE: ICD-10-CM

## 2019-06-26 PROCEDURE — 99214 OFFICE O/P EST MOD 30 MIN: CPT | Performed by: INTERNAL MEDICINE

## 2019-06-26 NOTE — PROGRESS NOTES
Terra Brown is a 46year old male. Patient presents with: Follow - Up: pt in for follow up on test results       HPI:       Patient states Celebrex helped neck pain very much; has not bothered stomch, .    Pain neck, bilateral; pain both sides spine total) by mouth every 6 (six) hours as needed. Disp: 40 tablet Rfl: 2   ibuprofen 100 MG Oral Tab Take 100 mg by mouth as needed for Fever.  Disp:  Rfl:    Ondansetron HCl (ZOFRAN) 4 mg tablet Take 1 tablet (4 mg total) by mouth every 8 (eight) hours as nee denies nausea, emesis or GERD  : denies dysuria,  frequency or urgency  MUSCULOSKELETAL: as HPI  NEUROLOGICAL: denies focal deficits  PSYCH: denies depression or anxiety    Physical Exam:   06/26/19  0827   BP: 130/80   Pulse: 99   Resp: 20   Temp: 98 °F - 2.0 mg/dL    Total Protein 7.8 6.4 - 8.2 g/dL    Albumin 3.9 3.4 - 5.0 g/dL    Globulin  3.9 2.8 - 4.4 g/dL    A/G Ratio 1.0 1.0 - 2.0    FASTING No    FOLIC ACID SERUM(FOLATE)   Result Value Ref Range    Folate (Folic Acid) 39.4 >=8.2 ng/mL   VITAMIN D, Patient states Celebrex helped very much. Warned about use of Celebrex with alcohol, regarding GI upset and bleeding potential, even hemorrhage. Few Celebrex given though with avoid taking daily with continued alcohol use.   Physiatry consult for pain con

## 2019-06-27 RX ORDER — CELECOXIB 200 MG/1
200 CAPSULE ORAL DAILY PRN
Qty: 10 CAPSULE | Refills: 0 | Status: SHIPPED | OUTPATIENT
Start: 2019-06-27 | End: 2019-07-06

## 2019-07-06 DIAGNOSIS — M48.02 CERVICAL SPINAL STENOSIS: ICD-10-CM

## 2019-07-08 ENCOUNTER — NURSE ONLY (OUTPATIENT)
Dept: ALLERGY | Facility: CLINIC | Age: 39
End: 2019-07-08
Payer: COMMERCIAL

## 2019-07-08 DIAGNOSIS — J30.89 ENVIRONMENTAL AND SEASONAL ALLERGIES: ICD-10-CM

## 2019-07-08 PROCEDURE — 95117 IMMUNOTHERAPY INJECTIONS: CPT | Performed by: ALLERGY & IMMUNOLOGY

## 2019-07-08 RX ORDER — CELECOXIB 200 MG/1
CAPSULE ORAL
Qty: 7 CAPSULE | Refills: 0 | Status: SHIPPED | OUTPATIENT
Start: 2019-07-08 | End: 2019-07-12

## 2019-07-08 NOTE — TELEPHONE ENCOUNTER
Last office visit: 6/26/2019 follow up on test results   Last refill: 6/27/2019 qty:10   Pt has FUTURE appt 8/28/2019

## 2019-07-12 DIAGNOSIS — M48.02 CERVICAL SPINAL STENOSIS: ICD-10-CM

## 2019-07-12 NOTE — TELEPHONE ENCOUNTER
Last office visit:6/26/2019 follow up on test results  Last refill: 7/8/2019 qty:7  Pt has FUTURE appt 8/28/2019

## 2019-07-15 RX ORDER — CELECOXIB 200 MG/1
200 CAPSULE ORAL DAILY
Qty: 7 CAPSULE | Refills: 0 | Status: SHIPPED | OUTPATIENT
Start: 2019-07-15 | End: 2019-08-14

## 2019-07-17 DIAGNOSIS — Z72.89 ALCOHOL USE: ICD-10-CM

## 2019-07-17 RX ORDER — OMEPRAZOLE 20 MG/1
CAPSULE, DELAYED RELEASE ORAL
Qty: 30 CAPSULE | Refills: 0 | Status: SHIPPED | OUTPATIENT
Start: 2019-07-17 | End: 2019-11-23

## 2019-07-22 ENCOUNTER — NURSE ONLY (OUTPATIENT)
Dept: ALLERGY | Facility: CLINIC | Age: 39
End: 2019-07-22
Payer: COMMERCIAL

## 2019-07-22 DIAGNOSIS — J30.89 ENVIRONMENTAL AND SEASONAL ALLERGIES: ICD-10-CM

## 2019-07-22 PROCEDURE — 95117 IMMUNOTHERAPY INJECTIONS: CPT | Performed by: ALLERGY & IMMUNOLOGY

## 2019-07-24 ENCOUNTER — OFFICE VISIT (OUTPATIENT)
Dept: INTERNAL MEDICINE CLINIC | Facility: CLINIC | Age: 39
End: 2019-07-24
Payer: COMMERCIAL

## 2019-07-24 VITALS
DIASTOLIC BLOOD PRESSURE: 88 MMHG | WEIGHT: 144 LBS | HEART RATE: 78 BPM | RESPIRATION RATE: 20 BRPM | HEIGHT: 72 IN | OXYGEN SATURATION: 98 % | BODY MASS INDEX: 19.5 KG/M2 | SYSTOLIC BLOOD PRESSURE: 130 MMHG | TEMPERATURE: 98 F

## 2019-07-24 DIAGNOSIS — M48.02 CERVICAL SPINAL STENOSIS: ICD-10-CM

## 2019-07-24 DIAGNOSIS — E83.119 HEMOCHROMATOSIS, UNSPECIFIED HEMOCHROMATOSIS TYPE: ICD-10-CM

## 2019-07-24 DIAGNOSIS — Z72.89 CHRONIC ALCOHOL USE: ICD-10-CM

## 2019-07-24 DIAGNOSIS — R51.9 NONINTRACTABLE EPISODIC HEADACHE, UNSPECIFIED HEADACHE TYPE: ICD-10-CM

## 2019-07-24 DIAGNOSIS — M54.2 CERVICAL SPINE PAIN: Primary | ICD-10-CM

## 2019-07-24 DIAGNOSIS — K21.9 GASTROESOPHAGEAL REFLUX DISEASE, ESOPHAGITIS PRESENCE NOT SPECIFIED: ICD-10-CM

## 2019-07-24 PROCEDURE — 99214 OFFICE O/P EST MOD 30 MIN: CPT | Performed by: INTERNAL MEDICINE

## 2019-07-24 RX ORDER — CELECOXIB 200 MG/1
200 CAPSULE ORAL DAILY
Qty: 30 CAPSULE | Refills: 0 | Status: SHIPPED | OUTPATIENT
Start: 2019-07-24 | End: 2020-04-21 | Stop reason: ALTCHOICE

## 2019-07-24 NOTE — PROGRESS NOTES
Daniel Ramon is a 46year old male. Patient presents with:  Medication Follow-Up: pt in for medication follow up       HPI:         Pain makes pt. nauseated, and he subsequently vomits.   Celebrex helps much, still has pain , like an ache, which with C 40 tablet Rfl: 2   Ondansetron HCl (ZOFRAN) 4 mg tablet Take 1 tablet (4 mg total) by mouth every 8 (eight) hours as needed for Nausea. Disp: 30 tablet Rfl: 5   Coenzyme Q10 (COQ-10) 200 MG Oral Cap Take by mouth 2 (two) times daily.    Disp:  Rfl:    proch frequent headaches, or focal deficits  HEME: denies excessive bruising or bleeding      Physical Exam:   07/24/19  1156 07/24/19  1242   BP: (!) 140/100 130/88   Pulse: 78    Resp: 20    Temp: 98.3 °F (36.8 °C)    TempSrc: Oral    SpO2: 98%    Weight: 144 - 10.1 mg/dL    Calculated Osmolality 293 275 - 295 mOsm/kg    GFR, Non- 101 >=60    GFR, -American 116 >=60    ALT 54 16 - 61 U/L    AST 93 (H) 15 - 37 U/L    Alkaline Phosphatase 61 45 - 117 U/L    Bilirubin, Total 1.3 0.1 - 2.0 mg Assessment/Plan:    (M54.2) Cervical spine pain  (primary encounter diagnosis)  Plan: PHYSICAL THERAPY - INTERNAL      Physical therapy did help somewhat in past.  To redo this.   Consider physiatry consult afterward if needed.    (M48.02) Cervical sp

## 2019-07-24 NOTE — PATIENT INSTRUCTIONS
Stop Celebrex if any nausea or abdominal pain  Take Celebrex with food and 1-2 glasses water   for any bloody or tarry stools Monitor BM's  Do not take Celebrex daily- take break from it on weekends

## 2019-07-29 ENCOUNTER — NURSE ONLY (OUTPATIENT)
Dept: ALLERGY | Facility: CLINIC | Age: 39
End: 2019-07-29
Payer: COMMERCIAL

## 2019-07-29 ENCOUNTER — HOSPITAL ENCOUNTER (OUTPATIENT)
Dept: CV DIAGNOSTICS | Facility: HOSPITAL | Age: 39
Discharge: HOME | End: 2019-07-29
Attending: INTERNAL MEDICINE
Payer: COMMERCIAL

## 2019-07-29 DIAGNOSIS — R00.0 TACHYCARDIA: ICD-10-CM

## 2019-07-29 DIAGNOSIS — J30.89 ENVIRONMENTAL AND SEASONAL ALLERGIES: ICD-10-CM

## 2019-07-29 PROCEDURE — 95115 IMMUNOTHERAPY ONE INJECTION: CPT | Performed by: ALLERGY & IMMUNOLOGY

## 2019-07-29 PROCEDURE — 93306 TTE W/DOPPLER COMPLETE: CPT | Performed by: INTERNAL MEDICINE

## 2019-07-29 PROCEDURE — 95165 ANTIGEN THERAPY SERVICES: CPT | Performed by: ALLERGY & IMMUNOLOGY

## 2019-08-05 ENCOUNTER — NURSE ONLY (OUTPATIENT)
Dept: ALLERGY | Facility: CLINIC | Age: 39
End: 2019-08-05
Payer: COMMERCIAL

## 2019-08-05 DIAGNOSIS — J30.89 ENVIRONMENTAL AND SEASONAL ALLERGIES: ICD-10-CM

## 2019-08-05 PROCEDURE — 95117 IMMUNOTHERAPY INJECTIONS: CPT | Performed by: ALLERGY & IMMUNOLOGY

## 2019-08-12 ENCOUNTER — NURSE ONLY (OUTPATIENT)
Dept: ALLERGY | Facility: CLINIC | Age: 39
End: 2019-08-12
Payer: COMMERCIAL

## 2019-08-12 DIAGNOSIS — J30.89 ENVIRONMENTAL AND SEASONAL ALLERGIES: ICD-10-CM

## 2019-08-12 PROCEDURE — 95117 IMMUNOTHERAPY INJECTIONS: CPT | Performed by: ALLERGY & IMMUNOLOGY

## 2019-08-13 ENCOUNTER — LAB ENCOUNTER (OUTPATIENT)
Dept: LAB | Facility: HOSPITAL | Age: 39
End: 2019-08-13
Attending: INTERNAL MEDICINE
Payer: COMMERCIAL

## 2019-08-13 ENCOUNTER — HOSPITAL ENCOUNTER (EMERGENCY)
Facility: HOSPITAL | Age: 39
Discharge: HOME | End: 2019-08-13
Attending: EMERGENCY MEDICINE
Payer: COMMERCIAL

## 2019-08-13 VITALS
OXYGEN SATURATION: 99 % | TEMPERATURE: 98 F | HEIGHT: 72 IN | RESPIRATION RATE: 16 BRPM | WEIGHT: 145 LBS | BODY MASS INDEX: 19.64 KG/M2 | SYSTOLIC BLOOD PRESSURE: 134 MMHG | DIASTOLIC BLOOD PRESSURE: 98 MMHG | HEART RATE: 74 BPM

## 2019-08-13 DIAGNOSIS — S01.81XA FACIAL LACERATION, INITIAL ENCOUNTER: Primary | ICD-10-CM

## 2019-08-13 DIAGNOSIS — E83.110 HEREDITARY HEMOCHROMATOSIS (HCC): ICD-10-CM

## 2019-08-13 LAB
ALBUMIN SERPL-MCNC: 3.4 G/DL (ref 3.4–5)
ALBUMIN/GLOB SERPL: 0.9 {RATIO} (ref 1–2)
ALP LIVER SERPL-CCNC: 50 U/L (ref 45–117)
ALT SERPL-CCNC: 53 U/L (ref 16–61)
ANION GAP SERPL CALC-SCNC: 6 MMOL/L (ref 0–18)
AST SERPL-CCNC: 150 U/L (ref 15–37)
BASOPHILS # BLD AUTO: 0.03 X10(3) UL (ref 0–0.2)
BASOPHILS NFR BLD AUTO: 0.9 %
BILIRUB SERPL-MCNC: 1.6 MG/DL (ref 0.1–2)
BUN BLD-MCNC: 6 MG/DL (ref 7–18)
BUN/CREAT SERPL: 6.7 (ref 10–20)
CALCIUM BLD-MCNC: 9.2 MG/DL (ref 8.5–10.1)
CHLORIDE SERPL-SCNC: 104 MMOL/L (ref 98–112)
CO2 SERPL-SCNC: 28 MMOL/L (ref 21–32)
CREAT BLD-MCNC: 0.9 MG/DL (ref 0.7–1.3)
DEPRECATED HBV CORE AB SER IA-ACNC: 537.9 NG/ML (ref 48–420)
DEPRECATED RDW RBC AUTO: 47.1 FL (ref 35.1–46.3)
EOSINOPHIL # BLD AUTO: 0.04 X10(3) UL (ref 0–0.7)
EOSINOPHIL NFR BLD AUTO: 1.2 %
ERYTHROCYTE [DISTWIDTH] IN BLOOD BY AUTOMATED COUNT: 12.5 % (ref 11–15)
GLOBULIN PLAS-MCNC: 3.8 G/DL (ref 2.8–4.4)
GLUCOSE BLD-MCNC: 81 MG/DL (ref 70–99)
HCT VFR BLD AUTO: 37.2 % (ref 39–53)
HGB BLD-MCNC: 12.4 G/DL (ref 13–17.5)
IMM GRANULOCYTES # BLD AUTO: 0.01 X10(3) UL (ref 0–1)
IMM GRANULOCYTES NFR BLD: 0.3 %
IRON SATURATION: 43 % (ref 20–50)
IRON SERPL-MCNC: 144 UG/DL (ref 65–175)
LYMPHOCYTES # BLD AUTO: 1.39 X10(3) UL (ref 1–4)
LYMPHOCYTES NFR BLD AUTO: 41.1 %
M PROTEIN MFR SERPL ELPH: 7.2 G/DL (ref 6.4–8.2)
MCH RBC QN AUTO: 34.2 PG (ref 26–34)
MCHC RBC AUTO-ENTMCNC: 33.3 G/DL (ref 31–37)
MCV RBC AUTO: 102.5 FL (ref 80–100)
MONOCYTES # BLD AUTO: 0.41 X10(3) UL (ref 0.1–1)
MONOCYTES NFR BLD AUTO: 12.1 %
NEUTROPHILS # BLD AUTO: 1.5 X10 (3) UL (ref 1.5–7.7)
NEUTROPHILS # BLD AUTO: 1.5 X10(3) UL (ref 1.5–7.7)
NEUTROPHILS NFR BLD AUTO: 44.4 %
OSMOLALITY SERPL CALC.SUM OF ELEC: 283 MOSM/KG (ref 275–295)
PATIENT FASTING: NO
PLATELET # BLD AUTO: 169 10(3)UL (ref 150–450)
POTASSIUM SERPL-SCNC: 4.3 MMOL/L (ref 3.5–5.1)
RBC # BLD AUTO: 3.63 X10(6)UL (ref 4.3–5.7)
SODIUM SERPL-SCNC: 138 MMOL/L (ref 136–145)
TOTAL IRON BINDING CAPACITY: 332 UG/DL (ref 240–450)
TRANSFERRIN SERPL-MCNC: 223 MG/DL (ref 200–360)
WBC # BLD AUTO: 3.4 X10(3) UL (ref 4–11)

## 2019-08-13 PROCEDURE — 84466 ASSAY OF TRANSFERRIN: CPT

## 2019-08-13 PROCEDURE — 36415 COLL VENOUS BLD VENIPUNCTURE: CPT

## 2019-08-13 PROCEDURE — 83540 ASSAY OF IRON: CPT

## 2019-08-13 PROCEDURE — 85025 COMPLETE CBC W/AUTO DIFF WBC: CPT

## 2019-08-13 PROCEDURE — 90471 IMMUNIZATION ADMIN: CPT

## 2019-08-13 PROCEDURE — 82728 ASSAY OF FERRITIN: CPT

## 2019-08-13 PROCEDURE — 99283 EMERGENCY DEPT VISIT LOW MDM: CPT

## 2019-08-13 PROCEDURE — 80053 COMPREHEN METABOLIC PANEL: CPT

## 2019-08-13 PROCEDURE — 12011 RPR F/E/E/N/L/M 2.5 CM/<: CPT

## 2019-08-13 RX ORDER — LIDOCAINE HYDROCHLORIDE AND EPINEPHRINE 20; 5 MG/ML; UG/ML
INJECTION, SOLUTION EPIDURAL; INFILTRATION; INTRACAUDAL; PERINEURAL
Status: COMPLETED
Start: 2019-08-13 | End: 2019-08-13

## 2019-08-13 RX ORDER — LIDOCAINE HYDROCHLORIDE AND EPINEPHRINE 20; 5 MG/ML; UG/ML
20 INJECTION, SOLUTION EPIDURAL; INFILTRATION; INTRACAUDAL; PERINEURAL ONCE
Status: COMPLETED | OUTPATIENT
Start: 2019-08-13 | End: 2019-08-13

## 2019-08-14 ENCOUNTER — OFFICE VISIT (OUTPATIENT)
Dept: HEMATOLOGY/ONCOLOGY | Facility: HOSPITAL | Age: 39
End: 2019-08-14
Attending: INTERNAL MEDICINE
Payer: COMMERCIAL

## 2019-08-14 VITALS
OXYGEN SATURATION: 100 % | BODY MASS INDEX: 19.91 KG/M2 | SYSTOLIC BLOOD PRESSURE: 138 MMHG | TEMPERATURE: 99 F | WEIGHT: 147 LBS | HEART RATE: 90 BPM | RESPIRATION RATE: 16 BRPM | DIASTOLIC BLOOD PRESSURE: 100 MMHG | HEIGHT: 72 IN

## 2019-08-14 DIAGNOSIS — Z78.9 HEAVY ALCOHOL USE: ICD-10-CM

## 2019-08-14 DIAGNOSIS — D53.9 MACROCYTIC ANEMIA: ICD-10-CM

## 2019-08-14 DIAGNOSIS — E83.110 HEREDITARY HEMOCHROMATOSIS (HCC): Primary | ICD-10-CM

## 2019-08-14 PROCEDURE — 99214 OFFICE O/P EST MOD 30 MIN: CPT | Performed by: INTERNAL MEDICINE

## 2019-08-14 NOTE — ED INITIAL ASSESSMENT (HPI)
States that 23 min ago he was walking dog and the dog pulled him and he fell to a hardwood floor. Has lac to the Rt eyebrow area. No LOC.  No FB noted

## 2019-08-14 NOTE — ED PROVIDER NOTES
Patient Seen in: [facility] AND [facility] Emergency Department    History   Patient presents with:  Laceration Abrasion (integumentary)    Stated Complaint: lac on head    HPI    HPI: Terra Brown is a 46year old male who presents after an injury to right fo tablet (4 mg total) by mouth every 8 (eight) hours as needed for Nausea. Coenzyme Q10 (COQ-10) 200 MG Oral Cap,  Take by mouth 2 (two) times daily.      prochlorperazine (COMPAZINE) 25 MG Rectal Suppos,  Place 1 suppository (25 mg total) rectally every 12 pain or paresthesias  EXTREMITIES: Intact, full range of motion. 2+ distal pulses. NEURO:Sensation to touch is intact. PSYCH: Normal affect. Calm and cooperative.     ED Course   Labs Reviewed - No data to display        MDM   44-year-old male presents w

## 2019-08-14 NOTE — PROGRESS NOTES
Hematology Progress Note    Patient Name: Therese Rosas   YOB: 1980   Medical Record Number: [id number]   CSN: [id number]   Consulting Physician: Maria A Rushing MD  Referring Physician(s):  Monie Harrington  Date of Visit: 8/14/2019     ADDI testing. He has been diagnosed with contact allergies by allergy/immunology. He denies any new symptoms or concerns on complaints or concerns on ROS.     Past Medical History:  Past Medical History:   Diagnosis Date   • Allergic rhinitis    • Arthritis    • on file        Minutes per session: Not on file      Stress: Not on file    Relationships      Social connections:        Talks on phone: Not on file        Gets together: Not on file        Attends [employer] service: Not on file        Active member of cl Take 1 tablet (5 mg total) by mouth nightly. Disp: 90 tablet Rfl: 1   Azelastine-Fluticasone (DYMISTA) 137-50 MCG/ACT Nasal Suspension 1 Squirt by Nasal route 2 (two) times daily.  Disp: 1 Bottle Rfl: 5   Metoclopramide HCl 10 MG Oral Tab Take 1-2 tablets ( nervous, but appropriate responses and questions  HEENT: EOMs intact. Oropharynx is clear. +abrasion over the right eye  Neck: No palpable lymphadenopathy. Neck is supple. Lymphatics:  There is no palpable lymphadenopathy throughout in the cervical, supra setting of elevated ferritin levels    Plan:    1.) Elevated ferritin levels    - pt has an H63D heterozygous mutation    --based on patient's current counts with lower hematocrit, not recommending phlebotomy at this time    --however, based on his hx of h

## 2019-08-18 ENCOUNTER — TELEPHONE (OUTPATIENT)
Dept: INTERNAL MEDICINE CLINIC | Facility: CLINIC | Age: 39
End: 2019-08-18

## 2019-08-18 DIAGNOSIS — I77.810 DILATED AORTIC ROOT (HCC): Primary | ICD-10-CM

## 2019-08-18 NOTE — TELEPHONE ENCOUNTER
Patient-no answer.   Asked him to call office tomorrow, or he could come into office for testing results

## 2019-08-19 ENCOUNTER — NURSE ONLY (OUTPATIENT)
Dept: ALLERGY | Facility: CLINIC | Age: 39
End: 2019-08-19
Payer: COMMERCIAL

## 2019-08-19 DIAGNOSIS — J30.89 ENVIRONMENTAL AND SEASONAL ALLERGIES: ICD-10-CM

## 2019-08-19 PROCEDURE — 95117 IMMUNOTHERAPY INJECTIONS: CPT | Performed by: ALLERGY & IMMUNOLOGY

## 2019-08-26 ENCOUNTER — NURSE ONLY (OUTPATIENT)
Dept: ALLERGY | Facility: CLINIC | Age: 39
End: 2019-08-26
Payer: COMMERCIAL

## 2019-08-26 DIAGNOSIS — J30.89 ENVIRONMENTAL AND SEASONAL ALLERGIES: ICD-10-CM

## 2019-08-26 PROCEDURE — 95117 IMMUNOTHERAPY INJECTIONS: CPT | Performed by: ALLERGY & IMMUNOLOGY

## 2019-08-27 ENCOUNTER — HOSPITAL ENCOUNTER (OUTPATIENT)
Dept: CT IMAGING | Facility: HOSPITAL | Age: 39
Discharge: HOME | End: 2019-08-27
Attending: INTERNAL MEDICINE
Payer: COMMERCIAL

## 2019-08-27 DIAGNOSIS — I77.810 DILATED AORTIC ROOT (HCC): ICD-10-CM

## 2019-08-27 PROCEDURE — 71260 CT THORAX DX C+: CPT | Performed by: INTERNAL MEDICINE

## 2019-09-02 ENCOUNTER — HOSPITAL ENCOUNTER (INPATIENT)
Facility: HOSPITAL | Age: 39
LOS: 3 days | Discharge: HOME | DRG: 683 | End: 2019-09-05
Attending: EMERGENCY MEDICINE | Admitting: HOSPITALIST
Payer: COMMERCIAL

## 2019-09-02 DIAGNOSIS — E86.0 DEHYDRATION: ICD-10-CM

## 2019-09-02 DIAGNOSIS — G43.A0 CYCLICAL VOMITING WITH NAUSEA, INTRACTABILITY OF VOMITING NOT SPECIFIED: Primary | ICD-10-CM

## 2019-09-02 DIAGNOSIS — F10.239 ALCOHOL WITHDRAWAL SYNDROME WITH COMPLICATION (HCC): ICD-10-CM

## 2019-09-02 PROBLEM — R11.15 CYCLICAL VOMITING WITH NAUSEA: Status: ACTIVE | Noted: 2019-09-02

## 2019-09-02 PROBLEM — F10.939 ALCOHOL WITHDRAWAL SYNDROME WITH COMPLICATION (HCC): Status: ACTIVE | Noted: 2019-09-02

## 2019-09-02 LAB
ALBUMIN SERPL-MCNC: 3.9 G/DL (ref 3.4–5)
ALP LIVER SERPL-CCNC: 63 U/L (ref 45–117)
ALT SERPL-CCNC: 96 U/L (ref 16–61)
ANION GAP SERPL CALC-SCNC: 33 MMOL/L (ref 0–18)
AST SERPL-CCNC: 225 U/L (ref 15–37)
BILIRUB DIRECT SERPL-MCNC: 0.6 MG/DL (ref 0–0.2)
BILIRUB SERPL-MCNC: 2.1 MG/DL (ref 0.1–2)
BUN BLD-MCNC: 16 MG/DL (ref 7–18)
BUN/CREAT SERPL: 9.5 (ref 10–20)
CALCIUM BLD-MCNC: 9.4 MG/DL (ref 8.5–10.1)
CHLORIDE SERPL-SCNC: 101 MMOL/L (ref 98–112)
CO2 SERPL-SCNC: 10 MMOL/L (ref 21–32)
CREAT BLD-MCNC: 1.68 MG/DL (ref 0.7–1.3)
GLUCOSE BLD-MCNC: 28 MG/DL (ref 70–99)
GLUCOSE BLDC GLUCOMTR-MCNC: 128 MG/DL (ref 70–99)
GLUCOSE BLDC GLUCOMTR-MCNC: 20 MG/DL (ref 70–99)
GLUCOSE BLDC GLUCOMTR-MCNC: 203 MG/DL (ref 70–99)
LIPASE SERPL-CCNC: 84 U/L (ref 73–393)
M PROTEIN MFR SERPL ELPH: 8.3 G/DL (ref 6.4–8.2)
OSMOLALITY SERPL CALC.SUM OF ELEC: 295 MOSM/KG (ref 275–295)
POTASSIUM SERPL-SCNC: 5.2 MMOL/L (ref 3.5–5.1)
SODIUM SERPL-SCNC: 144 MMOL/L (ref 136–145)

## 2019-09-02 PROCEDURE — 99223 1ST HOSP IP/OBS HIGH 75: CPT | Performed by: HOSPITALIST

## 2019-09-02 RX ORDER — METOCLOPRAMIDE HYDROCHLORIDE 5 MG/ML
5 INJECTION INTRAMUSCULAR; INTRAVENOUS ONCE
Status: COMPLETED | OUTPATIENT
Start: 2019-09-02 | End: 2019-09-02

## 2019-09-02 RX ORDER — LORAZEPAM 2 MG/ML
1 INJECTION INTRAMUSCULAR ONCE
Status: COMPLETED | OUTPATIENT
Start: 2019-09-02 | End: 2019-09-02

## 2019-09-02 RX ORDER — KETOROLAC TROMETHAMINE 30 MG/ML
30 INJECTION, SOLUTION INTRAMUSCULAR; INTRAVENOUS EVERY 6 HOURS PRN
Status: DISPENSED | OUTPATIENT
Start: 2019-09-02 | End: 2019-09-04

## 2019-09-02 RX ORDER — MONTELUKAST SODIUM 10 MG/1
10 TABLET ORAL DAILY
Status: DISCONTINUED | OUTPATIENT
Start: 2019-09-03 | End: 2019-09-05

## 2019-09-02 RX ORDER — MAGNESIUM HYDROXIDE/ALUMINUM HYDROXICE/SIMETHICONE 120; 1200; 1200 MG/30ML; MG/30ML; MG/30ML
30 SUSPENSION ORAL 4 TIMES DAILY PRN
Status: DISCONTINUED | OUTPATIENT
Start: 2019-09-02 | End: 2019-09-05

## 2019-09-02 RX ORDER — SODIUM CHLORIDE 9 MG/ML
1000 INJECTION, SOLUTION INTRAVENOUS ONCE
Status: COMPLETED | OUTPATIENT
Start: 2019-09-02 | End: 2019-09-03

## 2019-09-02 RX ORDER — MULTIPLE VITAMINS W/ MINERALS TAB 9MG-400MCG
1 TAB ORAL DAILY
Status: DISCONTINUED | OUTPATIENT
Start: 2019-09-03 | End: 2019-09-05

## 2019-09-02 RX ORDER — DEXTROSE MONOHYDRATE 25 G/50ML
50 INJECTION, SOLUTION INTRAVENOUS ONCE
Status: COMPLETED | OUTPATIENT
Start: 2019-09-02 | End: 2019-09-02

## 2019-09-02 RX ORDER — ONDANSETRON 2 MG/ML
4 INJECTION INTRAMUSCULAR; INTRAVENOUS EVERY 6 HOURS PRN
Status: DISCONTINUED | OUTPATIENT
Start: 2019-09-02 | End: 2019-09-05

## 2019-09-02 RX ORDER — KETOROLAC TROMETHAMINE 30 MG/ML
30 INJECTION, SOLUTION INTRAMUSCULAR; INTRAVENOUS ONCE
Status: COMPLETED | OUTPATIENT
Start: 2019-09-02 | End: 2019-09-02

## 2019-09-02 RX ORDER — ACETAMINOPHEN 325 MG/1
650 TABLET ORAL EVERY 6 HOURS PRN
Status: DISCONTINUED | OUTPATIENT
Start: 2019-09-02 | End: 2019-09-05

## 2019-09-02 RX ORDER — PANTOPRAZOLE SODIUM 40 MG/1
40 TABLET, DELAYED RELEASE ORAL
Status: DISCONTINUED | OUTPATIENT
Start: 2019-09-03 | End: 2019-09-05

## 2019-09-02 RX ORDER — HEPARIN SODIUM 5000 [USP'U]/ML
5000 INJECTION, SOLUTION INTRAVENOUS; SUBCUTANEOUS EVERY 12 HOURS SCHEDULED
Status: DISCONTINUED | OUTPATIENT
Start: 2019-09-03 | End: 2019-09-05

## 2019-09-02 RX ORDER — LORAZEPAM 2 MG/ML
1 INJECTION INTRAMUSCULAR ONCE
Status: DISCONTINUED | OUTPATIENT
Start: 2019-09-02 | End: 2019-09-05

## 2019-09-02 NOTE — ED INITIAL ASSESSMENT (HPI)
N/V x3 today hx of cyclic vomiting. Pt is taking zofran but states meds are not working. I will STOP taking the medications listed below when I get home from the hospital:    cephalexin 500 mg oral capsule  -- 1 cap(s) by mouth 4 times a day   -- Finish all this medication unless otherwise directed by prescriber.

## 2019-09-02 NOTE — ED PROVIDER NOTES
Patient Seen in: [facility] AND [facility] Emergency Department    History   Patient presents with:  Nausea/vomiting    Stated Complaint: vomiting x4 hours    HPI    Patient is here with complaint of vomiting that started about 4 hours ago he has history of cycl Take 1 tablet by mouth daily. Multiple Vitamin-Folic Acid Oral Tab,  Take by mouth. Montelukast Sodium (SINGULAIR) 10 MG Oral Tab,  Take 1 tablet (10 mg total) by mouth nightly.    Levocetirizine Dihydrochloride (XYZAL) 5 MG Oral Tab,  Take 1 tablet (5 non-tender, non-distended. No masses, no hepato-splenomegaly. Musculoskeletal:  Good muscle tone. Skin:  Warm, dry, well perfused. Good skin turgor. No rashes seen. Neurology:  Moving all extremities equally with good coordination.   No cranial nerv Abnormal; Notable for the following components:    POC Glucose  128 (*)     All other components within normal limits   CBC W/ DIFFERENTIAL - Abnormal; Notable for the following components:    WBC 17.9 (*)     .6 (*)     MCH 34.5 (*)     RDW-SD 52. 0

## 2019-09-03 ENCOUNTER — APPOINTMENT (OUTPATIENT)
Dept: HEMATOLOGY/ONCOLOGY | Facility: HOSPITAL | Age: 39
End: 2019-09-03
Attending: INTERNAL MEDICINE
Payer: COMMERCIAL

## 2019-09-03 LAB
ALBUMIN SERPL-MCNC: 2.6 G/DL (ref 3.4–5)
AMPHET UR QL SCN: NEGATIVE
ANION GAP SERPL CALC-SCNC: 15 MMOL/L (ref 0–18)
ANION GAP SERPL CALC-SCNC: 8 MMOL/L (ref 0–18)
BASOPHILS # BLD AUTO: 0.01 X10(3) UL (ref 0–0.2)
BASOPHILS # BLD AUTO: 0.04 X10(3) UL (ref 0–0.2)
BASOPHILS NFR BLD AUTO: 0.2 %
BASOPHILS NFR BLD AUTO: 0.2 %
BUN BLD-MCNC: 18 MG/DL (ref 7–18)
BUN BLD-MCNC: 18 MG/DL (ref 7–18)
BUN/CREAT SERPL: 10 (ref 10–20)
BUN/CREAT SERPL: 8.9 (ref 10–20)
CALCIUM BLD-MCNC: 6.9 MG/DL (ref 8.5–10.1)
CALCIUM BLD-MCNC: 7.4 MG/DL (ref 8.5–10.1)
CANNABINOIDS UR QL SCN: NEGATIVE
CHLORIDE SERPL-SCNC: 102 MMOL/L (ref 98–112)
CHLORIDE SERPL-SCNC: 106 MMOL/L (ref 98–112)
CO2 SERPL-SCNC: 19 MMOL/L (ref 21–32)
CO2 SERPL-SCNC: 24 MMOL/L (ref 21–32)
COCAINE UR QL: NEGATIVE
CREAT BLD-MCNC: 1.8 MG/DL (ref 0.7–1.3)
CREAT BLD-MCNC: 2.02 MG/DL (ref 0.7–1.3)
DEPRECATED RDW RBC AUTO: 45.7 FL (ref 35.1–46.3)
DEPRECATED RDW RBC AUTO: 52 FL (ref 35.1–46.3)
EOSINOPHIL # BLD AUTO: 0 X10(3) UL (ref 0–0.7)
EOSINOPHIL # BLD AUTO: 0.03 X10(3) UL (ref 0–0.7)
EOSINOPHIL NFR BLD AUTO: 0 %
EOSINOPHIL NFR BLD AUTO: 0.2 %
ERYTHROCYTE [DISTWIDTH] IN BLOOD BY AUTOMATED COUNT: 12 % (ref 11–15)
ERYTHROCYTE [DISTWIDTH] IN BLOOD BY AUTOMATED COUNT: 12.5 % (ref 11–15)
GLUCOSE BLD-MCNC: 292 MG/DL (ref 70–99)
GLUCOSE BLD-MCNC: 372 MG/DL (ref 70–99)
GLUCOSE BLDC GLUCOMTR-MCNC: 138 MG/DL (ref 70–99)
GLUCOSE BLDC GLUCOMTR-MCNC: 140 MG/DL (ref 70–99)
GLUCOSE BLDC GLUCOMTR-MCNC: 143 MG/DL (ref 70–99)
GLUCOSE BLDC GLUCOMTR-MCNC: 99 MG/DL (ref 70–99)
HAV IGM SER QL: 1.3 MG/DL (ref 1.6–2.6)
HAV IGM SER QL: 1.4 MG/DL (ref 1.6–2.6)
HCT VFR BLD AUTO: 31.1 % (ref 39–53)
HCT VFR BLD AUTO: 47.8 % (ref 39–53)
HGB BLD-MCNC: 10.4 G/DL (ref 13–17.5)
HGB BLD-MCNC: 14.9 G/DL (ref 13–17.5)
IMM GRANULOCYTES # BLD AUTO: 0.03 X10(3) UL (ref 0–1)
IMM GRANULOCYTES # BLD AUTO: 0.18 X10(3) UL (ref 0–1)
IMM GRANULOCYTES NFR BLD: 0.6 %
IMM GRANULOCYTES NFR BLD: 1 %
INR BLD: 1.24 (ref 0.9–1.2)
LACTATE SERPL-SCNC: 2.6 MMOL/L (ref 0.4–2)
LACTATE SERPL-SCNC: 5 MMOL/L (ref 0.4–2)
LACTATE SERPL-SCNC: 8 MMOL/L (ref 0.4–2)
LYMPHOCYTES # BLD AUTO: 0.41 X10(3) UL (ref 1–4)
LYMPHOCYTES # BLD AUTO: 0.96 X10(3) UL (ref 1–4)
LYMPHOCYTES NFR BLD AUTO: 5.4 %
LYMPHOCYTES NFR BLD AUTO: 7.9 %
MCH RBC QN AUTO: 34.5 PG (ref 26–34)
MCH RBC QN AUTO: 35 PG (ref 26–34)
MCHC RBC AUTO-ENTMCNC: 31.2 G/DL (ref 31–37)
MCHC RBC AUTO-ENTMCNC: 33.4 G/DL (ref 31–37)
MCV RBC AUTO: 104.7 FL (ref 80–100)
MCV RBC AUTO: 110.6 FL (ref 80–100)
MDMA UR QL SCN: NEGATIVE
MONOCYTES # BLD AUTO: 0.64 X10(3) UL (ref 0.1–1)
MONOCYTES # BLD AUTO: 1.05 X10(3) UL (ref 0.1–1)
MONOCYTES NFR BLD AUTO: 12.4 %
MONOCYTES NFR BLD AUTO: 5.9 %
NEUTROPHILS # BLD AUTO: 15.66 X10 (3) UL (ref 1.5–7.7)
NEUTROPHILS # BLD AUTO: 15.66 X10(3) UL (ref 1.5–7.7)
NEUTROPHILS # BLD AUTO: 4.08 X10 (3) UL (ref 1.5–7.7)
NEUTROPHILS # BLD AUTO: 4.08 X10(3) UL (ref 1.5–7.7)
NEUTROPHILS NFR BLD AUTO: 78.9 %
NEUTROPHILS NFR BLD AUTO: 87.3 %
OPIATES UR QL SCN: NEGATIVE
OSMOLALITY SERPL CALC.SUM OF ELEC: 295 MOSM/KG (ref 275–295)
OSMOLALITY SERPL CALC.SUM OF ELEC: 303 MOSM/KG (ref 275–295)
OXYCODONE UR QL SCN: NEGATIVE
PAPPENHEIMER BOD BLD QL SMEAR: PRESENT
PCP UR QL SCN: NEGATIVE
PHOSPHATE SERPL-MCNC: 0.8 MG/DL (ref 2.5–4.9)
PLATELET # BLD AUTO: 191 10(3)UL (ref 150–450)
PLATELET # BLD AUTO: 80 10(3)UL (ref 150–450)
PLATELET MORPHOLOGY: NORMAL
POTASSIUM SERPL-SCNC: 4.9 MMOL/L (ref 3.5–5.1)
POTASSIUM SERPL-SCNC: 5.9 MMOL/L (ref 3.5–5.1)
PROTHROMBIN TIME: 15.5 SECONDS (ref 11.8–14.5)
RBC # BLD AUTO: 2.97 X10(6)UL (ref 4.3–5.7)
RBC # BLD AUTO: 4.32 X10(6)UL (ref 4.3–5.7)
SODIUM SERPL-SCNC: 136 MMOL/L (ref 136–145)
SODIUM SERPL-SCNC: 138 MMOL/L (ref 136–145)
WBC # BLD AUTO: 17.9 X10(3) UL (ref 4–11)
WBC # BLD AUTO: 5.2 X10(3) UL (ref 4–11)

## 2019-09-03 PROCEDURE — 99255 IP/OBS CONSLTJ NEW/EST HI 80: CPT | Performed by: INTERNAL MEDICINE

## 2019-09-03 PROCEDURE — 99233 SBSQ HOSP IP/OBS HIGH 50: CPT | Performed by: HOSPITALIST

## 2019-09-03 RX ORDER — LORAZEPAM 1 MG/1
1 TABLET ORAL
Status: DISCONTINUED | OUTPATIENT
Start: 2019-09-03 | End: 2019-09-05

## 2019-09-03 RX ORDER — LORAZEPAM 2 MG/ML
1 INJECTION INTRAMUSCULAR
Status: DISCONTINUED | OUTPATIENT
Start: 2019-09-03 | End: 2019-09-05

## 2019-09-03 RX ORDER — LORAZEPAM 1 MG/1
2 TABLET ORAL
Status: DISCONTINUED | OUTPATIENT
Start: 2019-09-03 | End: 2019-09-05

## 2019-09-03 RX ORDER — FLUTICASONE PROPIONATE 50 MCG
1 SPRAY, SUSPENSION (ML) NASAL 2 TIMES DAILY
Status: DISCONTINUED | OUTPATIENT
Start: 2019-09-03 | End: 2019-09-05

## 2019-09-03 RX ORDER — CETIRIZINE HYDROCHLORIDE 5 MG/1
5 TABLET ORAL DAILY
Status: DISCONTINUED | OUTPATIENT
Start: 2019-09-03 | End: 2019-09-05

## 2019-09-03 RX ORDER — AZELASTINE HYDROCHLORIDE, FLUTICASONE PROPIONATE 137; 50 UG/1; UG/1
1 SPRAY, METERED NASAL 2 TIMES DAILY
Status: DISCONTINUED | OUTPATIENT
Start: 2019-09-03 | End: 2019-09-03 | Stop reason: RX

## 2019-09-03 RX ORDER — LORAZEPAM 2 MG/ML
2 INJECTION INTRAMUSCULAR
Status: DISCONTINUED | OUTPATIENT
Start: 2019-09-03 | End: 2019-09-05

## 2019-09-03 RX ORDER — SODIUM CHLORIDE 9 MG/ML
INJECTION, SOLUTION INTRAVENOUS CONTINUOUS
Status: DISCONTINUED | OUTPATIENT
Start: 2019-09-03 | End: 2019-09-05

## 2019-09-03 RX ORDER — MAGNESIUM SULFATE HEPTAHYDRATE 40 MG/ML
2 INJECTION, SOLUTION INTRAVENOUS ONCE
Status: COMPLETED | OUTPATIENT
Start: 2019-09-03 | End: 2019-09-03

## 2019-09-03 RX ORDER — AZELASTINE 1 MG/ML
1 SPRAY, METERED NASAL 2 TIMES DAILY
Status: DISCONTINUED | OUTPATIENT
Start: 2019-09-03 | End: 2019-09-05

## 2019-09-03 RX ORDER — MAGNESIUM OXIDE 400 MG (241.3 MG MAGNESIUM) TABLET
800 TABLET ONCE
Status: COMPLETED | OUTPATIENT
Start: 2019-09-03 | End: 2019-09-03

## 2019-09-03 NOTE — H&P
[facility]    History & Physical    Denisha Primes Patient Status:  Emergency    1980 MRN [id number]   Location [address] Attending Clifford Farias MD   Hosp Day # 0 PCP Isa Sandoval MD MCG/ACT Nasal Suspension   No No   Si Squirt by Nasal route 2 (two) times daily. Cholecalciferol (VITAMIN D3) 1000 units Oral Cap   Yes No   Sig: Take 1 tablet by mouth daily.    Coenzyme Q10 (COQ-10) 200 MG Oral Cap   Yes No   Sig: Take by mouth 2 (t light, extraocular movements are intact, Normal conjunctiva. HENT:  Normocephalic, oral mucosa is moist.  Head:  Normocephalic, atraumatic. Neck:  Supple, non-tender, no carotid bruit, no jugular venous distention, no lymphadenopathy, no thyromegaly.   Re later.    Chronic alcohol abuse with signs of withdrawal impending DTs  We will start patient on CIWA protocol, start thiamine and folic acid. Protein energy malnutrition, moderate  Likely secondary to poor p.o. intake, consider nutrition consult.     Po

## 2019-09-03 NOTE — PROGRESS NOTES
Azelastine-Fluticasone 137-50 MCG/ACT SUSP 1 Squirt bid  is Non-Formulary Medication &  Auto-Substituted with the individual components  Per P&T PROTOCOL

## 2019-09-03 NOTE — CONSULTS
[facility]ACKENZIE BOL[facility]    Report of Consultation    Date of Admission:  9/2/2019  Date of Consult:  9/3/2019   Reason for Consultation:     SETH and electrolytes imbalance    History of Present Illness:     Patient is a 46 yrs old male with pmh of cyc Medications:  LORazepam (ATIVAN) tab 1 mg 1 mg Oral Q1H PRN   Or      LORazepam (ATIVAN) injection 1 mg 1 mg Intravenous Q1H PRN   Or      LORazepam (ATIVAN) tab 2 mg 2 mg Oral Q1H PRN   Or      LORazepam (ATIVAN) injection 2 mg 2 mg Intravenous Q1H PRN for bleeding and easy bruising  Musculoskeletal:negative for back pain, bone pain and muscle weakness  Neurological: negative for gait problems, memory problems and seizures  Behavioral/Psych: negative for anxiety and depression    Physical Exam:   Height: 3.9  --  2.6*    136 138   K 5.2* 5.9* 4.9    102 106   CO2 10.0* 19.0* 24.0   ALKPHO 63  --   --    *  --   --    ALT 96*  --   --    BILT 2.1*  --   --    TP 8.3*  --   --      PTT   Date Value Ref Range Status   06/13/2019 27.5 23.2 -

## 2019-09-03 NOTE — PAYOR COMM NOTE
--------------  ADMISSION REVIEW     Payor: JOHN [facility]  Subscriber #:  [id number]  Authorization Number: [id number]    Admit date: 9/2/19  Admit time: 2218       Admitting Physician: Alexis Valdez MD  Attending Physician:  Roman Carbajal MD  Primary C Reviewed   BASIC METABOLIC PANEL (8) - Abnormal; Notable for the following components:       Result Value    Glucose 28 (*)     Potassium 5.2 (*)     CO2 10.0 (*)     Anion Gap 33 (*)     Creatinine 1.68 (*)     BUN/CREA Ratio 9.5 (*)     GFR, Non- patient on D5W, monitor Accu-Cheks frequently. Severe anion gap metabolic acidosis  We will check lactate, again likely secondary to dehydration and alcoholic ketoacidosis, we will start patient on bicarb drip, repeat BMP in 4 hours.     Acute kidney inj Intake. Tolerating diet now. IVF        NEPHROLOGY CONSULT    Reason for Consultation:      SETH and electrolytes imbalance          Impression:       1.  SETH: nonoliguric   - baseline Cr 0.9 mg/dl with an eGFR 110 ml/min  - SETH secondary to perfusion rela

## 2019-09-03 NOTE — PLAN OF CARE
Problem: Patient Centered Care  Goal: Patient preferences are identified and integrated in the patient's plan of care  Description  Interventions:  - What would you like us to know as we care for you?    - Provide timely, complete, and accurate informati influences on pain and pain management  - Manage/alleviate anxiety  - Utilize distraction and/or relaxation techniques  - Monitor for opioid side effects  - Notify MD/LIP if interventions unsuccessful or patient reports new pain  - Anticipate increased avtar Monitor Blood Glucose as ordered  - Assess for signs and symptoms of hyperglycemia and hypoglycemia  - Administer ordered medications to maintain glucose within target range  - Assess barriers to adequate nutritional intake and initiate nutrition consult a

## 2019-09-03 NOTE — PROGRESS NOTES
[facility]    Progress Note    Cloteal Hedger Patient Status:  Inpatient    1980 MRN [id number]   Location [facility] 5SW/SE Attending Daysi Pritchard MD   Hosp Day # 1 PCP Radha Mcgee MD       Subjective:     No n CIWA protocol  - IVF  - cont thiamine and folic acid  - counseled on etoh cessation     Protein energy malnutrition, moderate  Likely secondary to poor p.o. Intake. Tolerating diet now.   IVF     Possible depression     dvt proph:   heparin    Code status:

## 2019-09-03 NOTE — PROGRESS NOTES
[facility] Pharmacy Note:  Renal Dose Adjustment for Cetirizine (ZYRTEC)    Barb Avila has been prescribed Cetirizine (Zyrtec) 10 mg orally daily. Estimated Creatinine Clearance: 46.1 mL/min (A) (based on SCr of 2.02 mg/dL (H)).     His calculated creatinin

## 2019-09-03 NOTE — ED NOTES
Orders for admission, patient is aware of plan and ready to go upstairs.  Any questions, please call ED RN karthikeyan  at extension [phone]

## 2019-09-03 NOTE — PLAN OF CARE
Patient up to the bathroom with standby assistance. Patient given PRN pain medication x1 during shift for head/neck pain. Denies any nausea or vomiting throughout the day and is tolerating his diet well.     Problem: Patient Centered Care  Goal: Patient p prescribed range  Description  INTERVENTIONS:  - Monitor Blood Glucose as ordered  - Assess for signs and symptoms of hyperglycemia and hypoglycemia  - Administer ordered medications to maintain glucose within target range  - Assess barriers to adequate nu pt frequently for physical needs  - Identify cognitive and physical deficits and behaviors that affect risk of falls.   - [place] fall precautions as indicated by assessment.  - Educate pt/family on patient safety including physical limitations  - Instruc

## 2019-09-03 NOTE — PROGRESS NOTES
Multiple Vitamin-Folic Acid TABS 1 tablet is Non-Formulary Medication &  Auto-Substituted to MVI with Folic Acid Per P&T PROTOCOL

## 2019-09-04 LAB
25(OH)D3 SERPL-MCNC: 31 NG/ML (ref 30–100)
ALBUMIN SERPL-MCNC: 3 G/DL (ref 3.4–5)
ANION GAP SERPL CALC-SCNC: 5 MMOL/L (ref 0–18)
ANION GAP SERPL CALC-SCNC: 7 MMOL/L (ref 0–18)
BASOPHILS # BLD AUTO: 0.02 X10(3) UL (ref 0–0.2)
BASOPHILS NFR BLD AUTO: 0.5 %
BUN BLD-MCNC: 14 MG/DL (ref 7–18)
BUN BLD-MCNC: 19 MG/DL (ref 7–18)
BUN/CREAT SERPL: 11.3 (ref 10–20)
BUN/CREAT SERPL: 16.4 (ref 10–20)
CALCIUM BLD-MCNC: 8.2 MG/DL (ref 8.5–10.1)
CALCIUM BLD-MCNC: 8.8 MG/DL (ref 8.5–10.1)
CHLORIDE SERPL-SCNC: 108 MMOL/L (ref 98–112)
CHLORIDE SERPL-SCNC: 110 MMOL/L (ref 98–112)
CO2 SERPL-SCNC: 27 MMOL/L (ref 21–32)
CO2 SERPL-SCNC: 30 MMOL/L (ref 21–32)
CREAT BLD-MCNC: 1.16 MG/DL (ref 0.7–1.3)
CREAT BLD-MCNC: 1.24 MG/DL (ref 0.7–1.3)
DEPRECATED RDW RBC AUTO: 45.8 FL (ref 35.1–46.3)
EOSINOPHIL # BLD AUTO: 0.01 X10(3) UL (ref 0–0.7)
EOSINOPHIL NFR BLD AUTO: 0.2 %
ERYTHROCYTE [DISTWIDTH] IN BLOOD BY AUTOMATED COUNT: 12.1 % (ref 11–15)
GLUCOSE BLD-MCNC: 121 MG/DL (ref 70–99)
GLUCOSE BLD-MCNC: 72 MG/DL (ref 70–99)
GLUCOSE BLDC GLUCOMTR-MCNC: 66 MG/DL (ref 70–99)
GLUCOSE BLDC GLUCOMTR-MCNC: 74 MG/DL (ref 70–99)
GLUCOSE BLDC GLUCOMTR-MCNC: 79 MG/DL (ref 70–99)
GLUCOSE BLDC GLUCOMTR-MCNC: 85 MG/DL (ref 70–99)
GLUCOSE BLDC GLUCOMTR-MCNC: 89 MG/DL (ref 70–99)
HAV IGM SER QL: 2.1 MG/DL (ref 1.6–2.6)
HCT VFR BLD AUTO: 30.9 % (ref 39–53)
HGB BLD-MCNC: 10.4 G/DL (ref 13–17.5)
IMM GRANULOCYTES # BLD AUTO: 0.01 X10(3) UL (ref 0–1)
IMM GRANULOCYTES NFR BLD: 0.2 %
LYMPHOCYTES # BLD AUTO: 1.12 X10(3) UL (ref 1–4)
LYMPHOCYTES NFR BLD AUTO: 26.5 %
MCH RBC QN AUTO: 34.7 PG (ref 26–34)
MCHC RBC AUTO-ENTMCNC: 33.7 G/DL (ref 31–37)
MCV RBC AUTO: 103 FL (ref 80–100)
MONOCYTES # BLD AUTO: 0.38 X10(3) UL (ref 0.1–1)
MONOCYTES NFR BLD AUTO: 9 %
NEUTROPHILS # BLD AUTO: 2.69 X10 (3) UL (ref 1.5–7.7)
NEUTROPHILS # BLD AUTO: 2.69 X10(3) UL (ref 1.5–7.7)
NEUTROPHILS NFR BLD AUTO: 63.6 %
OSMOLALITY SERPL CALC.SUM OF ELEC: 295 MOSM/KG (ref 275–295)
OSMOLALITY SERPL CALC.SUM OF ELEC: 302 MOSM/KG (ref 275–295)
PHOSPHATE SERPL-MCNC: 0.6 MG/DL (ref 2.5–4.9)
PHOSPHATE SERPL-MCNC: 1.1 MG/DL (ref 2.5–4.9)
PHOSPHATE SERPL-MCNC: 1.7 MG/DL (ref 2.5–4.9)
PLATELET # BLD AUTO: 73 10(3)UL (ref 150–450)
POTASSIUM SERPL-SCNC: 4 MMOL/L (ref 3.5–5.1)
POTASSIUM SERPL-SCNC: 4.4 MMOL/L (ref 3.5–5.1)
RBC # BLD AUTO: 3 X10(6)UL (ref 4.3–5.7)
SODIUM SERPL-SCNC: 142 MMOL/L (ref 136–145)
SODIUM SERPL-SCNC: 145 MMOL/L (ref 136–145)
WBC # BLD AUTO: 4.2 X10(3) UL (ref 4–11)

## 2019-09-04 PROCEDURE — 99233 SBSQ HOSP IP/OBS HIGH 50: CPT | Performed by: INTERNAL MEDICINE

## 2019-09-04 PROCEDURE — 99233 SBSQ HOSP IP/OBS HIGH 50: CPT | Performed by: HOSPITALIST

## 2019-09-04 RX ORDER — ZOLPIDEM TARTRATE 10 MG/1
10 TABLET ORAL NIGHTLY
Status: DISCONTINUED | OUTPATIENT
Start: 2019-09-04 | End: 2019-09-05

## 2019-09-04 NOTE — PLAN OF CARE
Problem: Patient Centered Care  Goal: Patient preferences are identified and integrated in the patient's plan of care  Description  Interventions:  - What would you like us to know as we care for you? \" I live with my wife\".   - Provide timely, complete effectiveness of GI medications  - Encourage mobilization and activity  - Obtain nutritional consult as needed  - Establish a toileting routine/schedule  - Consider collaborating with pharmacy to review patient's medication profile  Outcome: Progressing interventions unsuccessful or patient reports new pain  - Anticipate increased pain with activity and pre-medicate as appropriate  Outcome: Progressing   Patient has denied any pain today.     Problem: RISK FOR INFECTION - ADULT  Goal: Absence of fever/infe

## 2019-09-04 NOTE — PROGRESS NOTES
[facility] [place]    Progress Note    Terra Brown Patient Status:  Inpatient    1980 MRN [id number]   Location [facility] 5SW/SE Attending Chelsie Miramontes MD   Hosp Day # 2 PCP Jonny Solares MD       Subjective:   Celso Held rashes present, no abnormal bruising noted  Back/Spine: no abnormalities noted  Musculoskeletal: full ROM all extremities good strength  no deformities  Extremities: no edema, cyanosis  Neurological:  Grossly normal    Results:     Laboratory Data:  Lab Re

## 2019-09-04 NOTE — PLAN OF CARE
Problem: Patient Centered Care  Goal: Patient preferences are identified and integrated in the patient's plan of care  Description  Interventions:  - What would you like us to know as we care for you? \" I live with my wife\".   - Provide timely, complete medications  - Encourage mobilization and activity  - Obtain nutritional consult as needed  - Establish a toileting routine/schedule  - Consider collaborating with pharmacy to review patient's medication profile  Outcome: Progressing     Problem: METABOLIC activity and pre-medicate as appropriate  Outcome: Progressing   Toradol given for pain w/ good result.   Problem: RISK FOR INFECTION - ADULT  Goal: Absence of fever/infection during anticipated neutropenic period  Description  INTERVENTIONS  - Monitor WBC

## 2019-09-05 VITALS
WEIGHT: 144.88 LBS | TEMPERATURE: 99 F | SYSTOLIC BLOOD PRESSURE: 131 MMHG | OXYGEN SATURATION: 99 % | DIASTOLIC BLOOD PRESSURE: 86 MMHG | HEART RATE: 52 BPM | RESPIRATION RATE: 18 BRPM | BODY MASS INDEX: 19.62 KG/M2 | HEIGHT: 72 IN

## 2019-09-05 LAB
ALBUMIN SERPL-MCNC: 2.7 G/DL (ref 3.4–5)
ANION GAP SERPL CALC-SCNC: 5 MMOL/L (ref 0–18)
BUN BLD-MCNC: 11 MG/DL (ref 7–18)
BUN/CREAT SERPL: 10.9 (ref 10–20)
CALCIUM BLD-MCNC: 8.5 MG/DL (ref 8.5–10.1)
CHLORIDE SERPL-SCNC: 108 MMOL/L (ref 98–112)
CO2 SERPL-SCNC: 31 MMOL/L (ref 21–32)
CREAT BLD-MCNC: 1.01 MG/DL (ref 0.7–1.3)
GLUCOSE BLD-MCNC: 86 MG/DL (ref 70–99)
GLUCOSE BLDC GLUCOMTR-MCNC: 84 MG/DL (ref 70–99)
HAV IGM SER QL: 1.7 MG/DL (ref 1.6–2.6)
OSMOLALITY SERPL CALC.SUM OF ELEC: 297 MOSM/KG (ref 275–295)
PHOSPHATE SERPL-MCNC: 2.8 MG/DL (ref 2.5–4.9)
POTASSIUM SERPL-SCNC: 4.2 MMOL/L (ref 3.5–5.1)
SODIUM SERPL-SCNC: 144 MMOL/L (ref 136–145)

## 2019-09-05 PROCEDURE — 99239 HOSP IP/OBS DSCHRG MGMT >30: CPT | Performed by: HOSPITALIST

## 2019-09-05 PROCEDURE — 99232 SBSQ HOSP IP/OBS MODERATE 35: CPT | Performed by: INTERNAL MEDICINE

## 2019-09-05 RX ORDER — MAGNESIUM OXIDE 400 MG (241.3 MG MAGNESIUM) TABLET
400 TABLET ONCE
Status: COMPLETED | OUTPATIENT
Start: 2019-09-05 | End: 2019-09-05

## 2019-09-05 NOTE — PROGRESS NOTES
Patient is dc home. Iv removed. Patient understands to f/u with dr. Ki Brennan and his pcp. Dc instructions went over with patient, knows when to take his medications next. Patient refused wheel chair, walked down himself and ok to drive home.  All needs met

## 2019-09-05 NOTE — PROGRESS NOTES
[facility]    Progress Note      Subjective:     Feels better. Appetite good.        Review of Systems:     Constitutional: negative for fatigue, fevers and weight loss  Eyes: negative for irritation, redness and visual disturbance  Ears, Oral Nightly   LORazepam (ATIVAN) tab 1 mg 1 mg Oral Q1H PRN   Or      LORazepam (ATIVAN) injection 1 mg 1 mg Intravenous Q1H PRN   Or      LORazepam (ATIVAN) tab 2 mg 2 mg Oral Q1H PRN   Or      LORazepam (ATIVAN) injection 2 mg 2 mg Intravenous Q1H PRN BILT 2.1*  --   --   --   --   --    TP 8.3*  --   --   --   --   --     < > = values in this interval not displayed.      PTT   Date Value Ref Range Status   06/13/2019 27.5 23.2 - 35.3 seconds Final     INR   Date Value Ref Range Status   09/02/2019 1.2

## 2019-09-05 NOTE — PLAN OF CARE
Problem: Patient Centered Care  Goal: Patient preferences are identified and integrated in the patient's plan of care  Description  Interventions:  - What would you like us to know as we care for you? \" I live with my wife\".   - Provide timely, complete activity  - Obtain nutritional consult as needed  - Establish a toileting routine/schedule  - Consider collaborating with pharmacy to review patient's medication profile  Outcome: Progressing     Problem: METABOLIC/FLUID AND ELECTROLYTES - ADULT  Goal: Glu Progressing     Problem: RISK FOR INFECTION - ADULT  Goal: Absence of fever/infection during anticipated neutropenic period  Description  INTERVENTIONS  - Monitor WBC  - Administer growth factors as ordered  - Implement neutropenic guidelines  Outcome: Pro

## 2019-09-05 NOTE — PAYOR COMM NOTE
--------------  CONTINUED STAY REVIEW    Payor: BCDONNIE [facility]  Subscriber #:  [id number]  Authorization Number: [id number]    Admit date: 9/2/19  Admit time: 2218    Admitting Physician: Judy Dunlap MD  Attending Physician:  Minerva Denis MD  Primary BS normal  Vascular: well perfused femoral, and pedal pulses normal  Skin/Hair: no unusual rashes present, no abnormal bruising noted  Back/Spine: no abnormalities noted  Musculoskeletal: full ROM all extremities good strength  no deformities  Extremities: (L) 09/04/2019     CREATSERUM 1.16 09/04/2019     BUN 19 (H) 09/04/2019      09/04/2019     K 4.0 09/04/2019      09/04/2019     CO2 27.0 09/04/2019     GLU 72 09/04/2019     CA 8.2 (L) 09/04/2019     ALB 2.6 (L) 09/03/2019     ALKPHO 63 09/02/ Propionate (FLONASE) 50 MCG/ACT nasal spray 1 spray     Date Action Dose Route User    9/4/2019 2104 Given 1 spray Each Nare Angel Echeverria, RN      Heparin Sodium (Porcine) 5000 UNIT/ML injection 5,000 Units     Date Action Dose Route User    9/4/2019 21

## 2019-09-06 ENCOUNTER — PATIENT OUTREACH (OUTPATIENT)
Dept: CASE MANAGEMENT | Age: 39
End: 2019-09-06

## 2019-09-06 NOTE — PAYOR COMM NOTE
--------------  DISCHARGE REVIEW    Payor: JOHN GARCÍA  Subscriber #:  [id number]  Authorization Number: [id number]    Admit date: 9/2/19  Admit time:  2218  Discharge Date: 9/5/2019  1:03 PM    Requesting reconsideration of this inpatient stay.   Previously anion gap metabolic acidosis. Severe lactic acidosi  Chronic alcohol abuse  .   Protein energy malnutrition, moderate       Problem List:     Patient Active Problem List:     Hemochromatosis     Hyperbilirubinemia     Cervical disc disorder with radiculopa significant alcohol consumption. Pt likely with etoh withdrawal syndrome and/or cyclic vomiting syndrome. Severe dehydration. SETH due to dehydration, hypovolemia. Hypophosphatemia.     Severe anion gap metabolic acidosis. Severe lactic acidosis.   All total) by mouth every 6 (six) hours as needed. Quantity:  40 tablet  Refills:  2     Multiple Vitamin-Folic Acid Tabs      Take 1 tablet by mouth daily.    Refills:  0     omeprazole 20 MG Cpdr  Commonly known as:  PRILOSEC      TAKE 1 TABLET BY MOUTH BEF throat  Respiratory: negative for cough, hemoptysis and wheezing  Cardiovascular: negative for chest pain, exertional dyspnea  Gastrointestinal: negative for abdominal pain, diarrhea and nausea  Genitourinary:negative for dysuria, frequency and hematuria Oral Daily   Azelastine HCl (ASTELIN) 0.1 % nasal spray 1 spray 1 spray Each Nare BID   And         Fluticasone Propionate (FLONASE) 50 MCG/ACT nasal spray 1 spray 1 spray Each Nare BID   cholecalciferol (VITAMIN D3) cap/tab 2,000 Units 2,000 Units Oral Da (H) 0.90 - 1.20 Final       Comment:          Only the INR (not the Protime value) should be utilized for   the monitoring of oral anticoagulant therapy.      Recommended therapeutic ranges for anticoagulant therapy are   as follows:   2.0 - 3.0 All indica

## 2019-09-06 NOTE — DISCHARGE SUMMARY
[facility]    Discharge Summary    Clotsera Purvisr Patient Status:  Inpatient    1980 MRN [id number]   Location [facility] 5SW/SE Attending No att. providers found   Hosp Day # 3 PCP Radha Mcgee MD     Date of Admi bilat  Abd:   +bs, soft, NT, ND  LE:  No c/c/e  Neuro:  nonfocal      Reason for Admission:   Nausea/vomiting    History of Present Illness:     Rashi Meehan is a(n) 46year old male, with past medical history significant for cyclic vomiting syndrome and TERRANCE  What changed:  when to take this      Take 1 tablet (10 mg total) by mouth nightly.    Quantity:  90 tablet  Refills:  1        CONTINUE taking these medications      Instructions Prescription details   Azelastine-Fluticasone 137-50 MCG/ACT Susp Nephrology. Contact information:  [facility]  [phone]                   Hospital Discharge Diagnoses: SETH    Lace+ Score: 64  59-90 High Risk  29-58 Medium Risk  0-28   Low Risk.     TCM Follow-Up Recommendation:  LACE >

## 2019-09-09 ENCOUNTER — NURSE ONLY (OUTPATIENT)
Dept: ALLERGY | Facility: CLINIC | Age: 39
End: 2019-09-09
Payer: COMMERCIAL

## 2019-09-09 ENCOUNTER — OFFICE VISIT (OUTPATIENT)
Dept: GASTROENTEROLOGY | Facility: CLINIC | Age: 39
End: 2019-09-09
Payer: COMMERCIAL

## 2019-09-09 VITALS
HEIGHT: 72 IN | BODY MASS INDEX: 19.91 KG/M2 | WEIGHT: 147 LBS | HEART RATE: 84 BPM | DIASTOLIC BLOOD PRESSURE: 87 MMHG | SYSTOLIC BLOOD PRESSURE: 129 MMHG

## 2019-09-09 DIAGNOSIS — R11.15 INTRACTABLE CYCLICAL VOMITING WITH NAUSEA: Primary | ICD-10-CM

## 2019-09-09 DIAGNOSIS — K76.0 FATTY LIVER: ICD-10-CM

## 2019-09-09 DIAGNOSIS — J30.89 ENVIRONMENTAL AND SEASONAL ALLERGIES: ICD-10-CM

## 2019-09-09 DIAGNOSIS — K21.9 GASTROESOPHAGEAL REFLUX DISEASE, ESOPHAGITIS PRESENCE NOT SPECIFIED: ICD-10-CM

## 2019-09-09 PROCEDURE — 99214 OFFICE O/P EST MOD 30 MIN: CPT | Performed by: INTERNAL MEDICINE

## 2019-09-09 PROCEDURE — 95117 IMMUNOTHERAPY INJECTIONS: CPT | Performed by: ALLERGY & IMMUNOLOGY

## 2019-09-09 NOTE — PROGRESS NOTES
HPI:    Patient ID: Ivan Kasper returns today for follow-up. It has been a rough few months. Since last time, he was in the ED on 3/9/8145 for the cyclic vomiting and then hospitalized 9/2/2019– 9/5/2019 last week for the vomiting.   Last week, he wa 06/26/19 : 143 lb (64.9 kg)  06/13/19 : 143 lb (64.9 kg)  06/03/19 : 142 lb (64.4 kg)  05/14/19 : 146 lb (66.2 kg)  05/08/19 : 151 lb (68.5 kg)  05/07/19 : 151 lb (68.5 kg)  04/24/19 : 142 lb (64.4 kg)  04/10/19 : 140 lb (63.5 kg)  03/11/19 : 137 lb 12.8 o Aury Pardo comes in today for follow-up describing increased recent vomiting activity. As below, 2 recent Emergency Room visits 7/24/2018 and 9/16/2018 for intractable vomiting. The episode of July was milder, fairly quick recovery.   The episode of Septe The patient presents to the ED complaining of constant vomiting for the past 8 hours. He states that he has a history of cyclical vomiting and had recurrence of symptoms today. Ongoing vomiting without significant nausea.   Patient now feels severely dehy Recent constipation past several weeks, now resolving. This is a new concern. No previous discussion of constipation. No full vomiting episodes since Labor Day and before that late May early June 2017.     Ongoing alcohol consumption, still better than No full vomiting episodes since Labor Day and before that late May early June 2017. Fernando Maldonado describes a vague nausea, sense of imminent gagging or vomiting up into the neck region for the past week as if an episode of vomiting may be coming.   Drinking a gl There are 2 concerns here, a frequent nausea, hypersensitivity to food odors as previously described causing a very poor appetite, difficulty keeping up with caloric intake; and rarely the full cyclic vomiting syndrome.   As per inpatient notes, Elsie Connor -Cdiff positive, d/w patient he continues to have diarrhea, but non-toxic symptoms. No fevers. No sig ab pain.  -Will start flagyl 500mg TID x14 days  -Encouraged careful handwashing and isolating himself to one toilet at home. Use bleach to wipe surfaces. Patient is seen in office follow-up. He is familiar to me from a previous office visit on 10/5/2017. At that time he was seen for a hospital follow-up due to cyclical nausea and vomiting and severe weakness.   Patient was followed by Dr. Rafael Navarrete while March Fat Patient states he did not follow-up with Jessica Richardson per our last office visit. He states he received a phone call but does not answer his phone very much due to possible scam calls. Unfortunately, he has continued to drink alcohol.   He has cut back some 1.  Nausea/vomiting/alcohol abuse/altered bowel habits/abdominal cramping: Physical exam unremarkable. Patient has not been taking probiotics and CoQ10 as he was previously. I have encouraged him to restart these.   His acute symptoms may be due to possib Date of Admission:  9/1/2017  Date of Consult:  9/3/2017  PCP: Annemarie Fabry  Reason for Consultation:   Intractable vomiting     History of Present Illness:      44-year-old man with history of question of hemochromatosis history in Epic chart; landon Feeling much better today. Has tolerated solid meals all day yesterday and this morning.   Still weak, some abdominal and back pain.     During conversations with Dr. Penny Salazar and others this admission, Mr Danny Fuentes is describing \"cyclical vomiting\" every 3-6 m · Absolute importance of alcohol cessation discussed to clarify the nature of this vomiting and more importantly the liver disease. Alcohol's effect on ferritin level will make that difficult to interpret.   Liver biopsy similarly will not be helpful in th GALLBLADDER:            Normal size. No calculi, wall thickening or pericholecystic fluid. Sonographic Rush's sign was not elicited. BILE DUCTS:    Normal.  Common bile duct measures 3 mm. PANCREAS:      The visualized pancreas is unremarkable.   OT No airspace consolidation, pleural effusion, or pneumothorax is detected. AIRWAYS:         The tracheobronchial tree is without central mass or obstructing lesion. MEDIASTINUM/BUSHRA:    No mass or lymphadenopathy.     CHEST WALL:  No axillar Levocetirizine Dihydrochloride (XYZAL) 5 MG Oral Tab Take 1 tablet (5 mg total) by mouth nightly.  (Patient taking differently: Take 5 mg by mouth daily.  ) Disp: 90 tablet Rfl: 1   Azelastine-Fluticasone (DYMISTA) 137-50 MCG/ACT Nasal Suspension 1 Squirt b 46year old gentleman with complex history of daily alcohol consumption, previous concern for alcohol abuse; cyclic vomiting syndrome; abnormal iron studies in the setting of excessive alcohol consumption.     Previous testing has raised the question of hem Continue Zofran tablets, Compazine suppositories as needed for the nausea. Alcohol cessation again discussed at length in detail 3/11/2019 and 9/9/2019.     Weight loss, abnormal LFTs: previous duodenal biopsies were normal 12/12014; celiac serologies we Now seeing Dr. Fermin Rodarte. Repeat MRI abdomen has been ordered. Would defer liver biopsy until he is abstaining from alcohol. 5.  Alcohol abuse and likely alcoholic liver disease. Mr Justin Kingsley shows insight and is trying to quit.   Likely drinking

## 2019-09-13 ENCOUNTER — APPOINTMENT (OUTPATIENT)
Dept: HEMATOLOGY/ONCOLOGY | Facility: HOSPITAL | Age: 39
End: 2019-09-13
Payer: COMMERCIAL

## 2019-09-16 ENCOUNTER — NURSE ONLY (OUTPATIENT)
Dept: ALLERGY | Facility: CLINIC | Age: 39
End: 2019-09-16
Payer: COMMERCIAL

## 2019-09-16 DIAGNOSIS — J30.89 ENVIRONMENTAL AND SEASONAL ALLERGIES: ICD-10-CM

## 2019-09-16 PROCEDURE — 95165 ANTIGEN THERAPY SERVICES: CPT | Performed by: ALLERGY & IMMUNOLOGY

## 2019-09-16 PROCEDURE — 95115 IMMUNOTHERAPY ONE INJECTION: CPT | Performed by: ALLERGY & IMMUNOLOGY

## 2019-09-23 ENCOUNTER — NURSE ONLY (OUTPATIENT)
Dept: ALLERGY | Facility: CLINIC | Age: 39
End: 2019-09-23
Payer: COMMERCIAL

## 2019-09-23 DIAGNOSIS — J30.89 ENVIRONMENTAL AND SEASONAL ALLERGIES: ICD-10-CM

## 2019-09-23 PROCEDURE — 95115 IMMUNOTHERAPY ONE INJECTION: CPT | Performed by: ALLERGY & IMMUNOLOGY

## 2019-09-25 ENCOUNTER — APPOINTMENT (OUTPATIENT)
Dept: PHYSICAL THERAPY | Facility: HOSPITAL | Age: 39
End: 2019-09-25
Attending: INTERNAL MEDICINE
Payer: COMMERCIAL

## 2019-09-26 ENCOUNTER — TELEPHONE (OUTPATIENT)
Dept: INTERNAL MEDICINE CLINIC | Facility: CLINIC | Age: 39
End: 2019-09-26

## 2019-09-26 NOTE — TELEPHONE ENCOUNTER
Chandan April with [address] called to updated dr that Adeel Wright was recently in the hospital & now coming back to PT from the orders written in July, did you want to update any orders for neck/back pain?   Princess Serrato

## 2019-09-27 ENCOUNTER — APPOINTMENT (OUTPATIENT)
Dept: PHYSICAL THERAPY | Facility: HOSPITAL | Age: 39
End: 2019-09-27
Attending: INTERNAL MEDICINE
Payer: COMMERCIAL

## 2019-09-27 ENCOUNTER — TELEPHONE (OUTPATIENT)
Dept: INTERNAL MEDICINE CLINIC | Facility: CLINIC | Age: 39
End: 2019-09-27

## 2019-09-27 NOTE — TELEPHONE ENCOUNTER
Fátima at P.T. needs clarification for pt to have P.T. Today. Addend:  Patient was to have physical therapy. Also, although would like him to follow-up in office prior for evaluation after hospitalization. ,  Today in the ER patient was seen for upper

## 2019-09-30 ENCOUNTER — APPOINTMENT (OUTPATIENT)
Dept: GENERAL RADIOLOGY | Age: 39
End: 2019-09-30
Attending: FAMILY MEDICINE
Payer: COMMERCIAL

## 2019-09-30 ENCOUNTER — NURSE ONLY (OUTPATIENT)
Dept: ALLERGY | Facility: CLINIC | Age: 39
End: 2019-09-30
Payer: COMMERCIAL

## 2019-09-30 ENCOUNTER — TELEPHONE (OUTPATIENT)
Dept: PHYSICAL THERAPY | Facility: HOSPITAL | Age: 39
End: 2019-09-30

## 2019-09-30 ENCOUNTER — HOSPITAL ENCOUNTER (OUTPATIENT)
Age: 39
Discharge: HOME | End: 2019-09-30
Attending: FAMILY MEDICINE
Payer: COMMERCIAL

## 2019-09-30 VITALS
RESPIRATION RATE: 16 BRPM | DIASTOLIC BLOOD PRESSURE: 106 MMHG | HEART RATE: 116 BPM | OXYGEN SATURATION: 100 % | SYSTOLIC BLOOD PRESSURE: 141 MMHG | TEMPERATURE: 98 F

## 2019-09-30 DIAGNOSIS — S60.221A CONTUSION OF RIGHT HAND, INITIAL ENCOUNTER: Primary | ICD-10-CM

## 2019-09-30 DIAGNOSIS — R03.0 ELEVATED BLOOD PRESSURE READING: ICD-10-CM

## 2019-09-30 DIAGNOSIS — J30.89 ENVIRONMENTAL AND SEASONAL ALLERGIES: ICD-10-CM

## 2019-09-30 PROCEDURE — 99203 OFFICE O/P NEW LOW 30 MIN: CPT

## 2019-09-30 PROCEDURE — 95117 IMMUNOTHERAPY INJECTIONS: CPT | Performed by: ALLERGY & IMMUNOLOGY

## 2019-09-30 PROCEDURE — 73130 X-RAY EXAM OF HAND: CPT | Performed by: FAMILY MEDICINE

## 2019-09-30 RX ORDER — CELECOXIB 200 MG/1
200 CAPSULE ORAL DAILY
Qty: 30 CAPSULE | Refills: 0 | OUTPATIENT
Start: 2019-09-30

## 2019-09-30 RX ORDER — IBUPROFEN 600 MG/1
600 TABLET ORAL ONCE
Status: COMPLETED | OUTPATIENT
Start: 2019-09-30 | End: 2019-09-30

## 2019-09-30 RX ORDER — IBUPROFEN 600 MG/1
600 TABLET ORAL EVERY 8 HOURS PRN
Qty: 30 TABLET | Refills: 0 | Status: SHIPPED | OUTPATIENT
Start: 2019-09-30 | End: 2019-10-10

## 2019-09-30 NOTE — ED PROVIDER NOTES
Patient Seen in: [facility]      History   Patient presents with:  Upper Extremity Injury (musculoskeletal)    Stated Complaint: right hand pain     HPI    28-year-old male with a history of osteoarthritis, allergic rhiniti and vital signs reviewed. All other systems reviewed and negative except as noted above.     Physical Exam     ED Triage Vitals [09/30/19 1102]   BP (!) 135/113   Pulse 116   Resp 16   Temp 98.3 °F (36.8 °C)   Temp src Temporal   SpO2 100 %   O2 Device am    Follow-up:  Sabas Aly MD  [address], [address] [phone]    Go to           Medications Prescribed:  Current Discharge Medication List    START taking these medications    ibuprofen 600 MG Oral Tab  Take

## 2019-10-01 ENCOUNTER — APPOINTMENT (OUTPATIENT)
Dept: PHYSICAL THERAPY | Facility: HOSPITAL | Age: 39
End: 2019-10-01
Attending: INTERNAL MEDICINE
Payer: COMMERCIAL

## 2019-10-01 ENCOUNTER — TELEPHONE (OUTPATIENT)
Dept: INTERNAL MEDICINE CLINIC | Facility: CLINIC | Age: 39
End: 2019-10-01

## 2019-10-03 ENCOUNTER — APPOINTMENT (OUTPATIENT)
Dept: PHYSICAL THERAPY | Facility: HOSPITAL | Age: 39
End: 2019-10-03
Attending: INTERNAL MEDICINE
Payer: COMMERCIAL

## 2019-10-07 ENCOUNTER — TELEPHONE (OUTPATIENT)
Dept: INTERNAL MEDICINE CLINIC | Facility: CLINIC | Age: 39
End: 2019-10-07

## 2019-10-07 ENCOUNTER — NURSE ONLY (OUTPATIENT)
Dept: ALLERGY | Facility: CLINIC | Age: 39
End: 2019-10-07
Payer: COMMERCIAL

## 2019-10-07 DIAGNOSIS — J30.89 ENVIRONMENTAL AND SEASONAL ALLERGIES: ICD-10-CM

## 2019-10-07 PROCEDURE — 95117 IMMUNOTHERAPY INJECTIONS: CPT | Performed by: ALLERGY & IMMUNOLOGY

## 2019-10-07 NOTE — TELEPHONE ENCOUNTER
Called patient to verify appointment changes made,  did open Wednesday schedule, I LVM on cell for him to c/b to confirm new appt time.  CV

## 2019-10-08 ENCOUNTER — APPOINTMENT (OUTPATIENT)
Dept: PHYSICAL THERAPY | Facility: HOSPITAL | Age: 39
End: 2019-10-08
Attending: INTERNAL MEDICINE
Payer: COMMERCIAL

## 2019-10-08 ENCOUNTER — TELEPHONE (OUTPATIENT)
Dept: HEMATOLOGY/ONCOLOGY | Facility: HOSPITAL | Age: 39
End: 2019-10-08

## 2019-10-08 NOTE — TELEPHONE ENCOUNTER
LM requesting a call back with regard to orders for labs, scan and a follow up appointment with Dr Ricardo Green that he cancelled on Sept 27th. Please call back to reschedule these important appointments.

## 2019-10-09 ENCOUNTER — OFFICE VISIT (OUTPATIENT)
Dept: INTERNAL MEDICINE CLINIC | Facility: CLINIC | Age: 39
End: 2019-10-09
Payer: COMMERCIAL

## 2019-10-09 VITALS
BODY MASS INDEX: 20.45 KG/M2 | HEART RATE: 92 BPM | HEIGHT: 72 IN | DIASTOLIC BLOOD PRESSURE: 78 MMHG | SYSTOLIC BLOOD PRESSURE: 110 MMHG | WEIGHT: 151 LBS | OXYGEN SATURATION: 97 %

## 2019-10-09 DIAGNOSIS — E44.0 MODERATE PROTEIN-CALORIE MALNUTRITION (HCC): ICD-10-CM

## 2019-10-09 DIAGNOSIS — R89.9 ABNORMAL LABORATORY TEST: ICD-10-CM

## 2019-10-09 DIAGNOSIS — N17.9 ACUTE KIDNEY INJURY (HCC): ICD-10-CM

## 2019-10-09 DIAGNOSIS — E83.39 HYPOPHOSPHATEMIA: Primary | ICD-10-CM

## 2019-10-09 DIAGNOSIS — E87.6 HYPOKALEMIA: ICD-10-CM

## 2019-10-09 DIAGNOSIS — E87.2 INCREASED ANION GAP METABOLIC ACIDOSIS: ICD-10-CM

## 2019-10-09 DIAGNOSIS — E83.119 HEMOCHROMATOSIS, UNSPECIFIED HEMOCHROMATOSIS TYPE: ICD-10-CM

## 2019-10-09 DIAGNOSIS — R11.15 CYCLIC VOMITING SYNDROME: ICD-10-CM

## 2019-10-09 DIAGNOSIS — D75.89 MACROCYTOSIS: ICD-10-CM

## 2019-10-09 DIAGNOSIS — E83.51 HYPOCALCEMIA: ICD-10-CM

## 2019-10-09 DIAGNOSIS — F10.10 CHRONIC ALCOHOL ABUSE: ICD-10-CM

## 2019-10-09 DIAGNOSIS — E16.2 HYPOGLYCEMIA: ICD-10-CM

## 2019-10-09 DIAGNOSIS — E88.09 HYPOALBUMINEMIA: ICD-10-CM

## 2019-10-09 PROCEDURE — 1111F DSCHRG MED/CURRENT MED MERGE: CPT | Performed by: INTERNAL MEDICINE

## 2019-10-09 PROCEDURE — 99214 OFFICE O/P EST MOD 30 MIN: CPT | Performed by: INTERNAL MEDICINE

## 2019-10-09 NOTE — PROGRESS NOTES
Herminia Turner is a 46year old male. Patient presents with:  Hospital F/U      HPI:       To be evaluated after hospitalization prior to PT.    With vomiting, starts with feeling of \"knowing he is going to vomit\", can not describe why, not nauseated i Cap Take by mouth 2 (two) times daily. Disp:  Rfl:    celecoxib (CELEBREX) 200 MG Oral Cap Take 1 capsule (200 mg total) by mouth daily.  TAKE WITH FOOD AND AT LEAST 1 GLASS WATERSKIP A DAY ON OCCASION Disp: 30 capsule Rfl: 0   Ondansetron HCl (Lorice Patient) 4 palpitations, denies edema  RESPIRATORY:denies cough, denies shortness of breath  GASTROINTESTINAL: denies abdominal pain, denies diarrhea, denies nausea, emesis now  : denies dysuria,  frequency or urgency  MUSCULOSKELETAL: denies unusual joint pain, sw 8.3 (H) 6.4 - 8.2 g/dL    Albumin 3.9 3.4 - 5.0 g/dL   LIPASE   Result Value Ref Range    Lipase 84 73 - 393 U/L   MD BLOOD SMEAR CONSULT   Result Value Ref Range    MD Blood Smear Consult       Review of the peripheral blood smear shows macrocytosis witho Calcium, Total 6.9 (L) 8.5 - 10.1 mg/dL    Calculated Osmolality 303 (H) 275 - 295 mOsm/kg    GFR, Non- 47 (L) >=60    GFR, -American 54 (L) >=47   BASIC METABOLIC PANEL (8)   Result Value Ref Range    Glucose 292 (H) 70 - 99 mg/dL 145 mmol/L    Potassium 4.0 3.5 - 5.1 mmol/L    Chloride 108 98 - 112 mmol/L    CO2 27.0 21.0 - 32.0 mmol/L    Anion Gap 7 0 - 18 mmol/L    BUN 19 (H) 7 - 18 mg/dL    Creatinine 1.16 0.70 - 1.30 mg/dL    BUN/CREA Ratio 16.4 10.0 - 20.0    Calcium, Total 8. 70 - 99   POCT GLUCOSE   Result Value Ref Range    POC Glucose  140 (H) 70 - 99   POCT GLUCOSE   Result Value Ref Range    POC Glucose  138 (H) 70 - 99   POCT GLUCOSE   Result Value Ref Range    POC Glucose  99 70 - 99   POCT GLUCOSE   Result Value Ref Ran Immature Granulocyte % 1.0 %   CBC W/ DIFFERENTIAL   Result Value Ref Range    WBC 5.2 4.0 - 11.0 x10(3) uL    RBC 2.97 (L) 4.30 - 5.70 x10(6)uL    HGB 10.4 (L) 13.0 - 17.5 g/dL    HCT 31.1 (L) 39.0 - 53.0 %    .7 (H) 80.0 - 100.0 fL    MCH 35.0 (H) COMPARISON:  None. INDICATIONS:  right hand pain  PATIENT STATED HISTORY: (As transcribed by Technologist)  Patient hit right hand on a counter. Pain in proximal right 5th metacarpal.    FINDINGS:  No fracture is demonstrated.   Soft tissues are radiograph hospitalization because of nausea    (F46.58) Cyclic vomiting syndrome  Plan: Follow-up GI     (N17.9) Acute kidney injury ([facility] [address]  Plan: To recheck.   Adequate hydration.    (E87.2) Increased anion gap metabolic acidosis  Plan: Re-advised of severity of his

## 2019-10-10 ENCOUNTER — APPOINTMENT (OUTPATIENT)
Dept: PHYSICAL THERAPY | Facility: HOSPITAL | Age: 39
End: 2019-10-10
Attending: INTERNAL MEDICINE
Payer: COMMERCIAL

## 2019-10-14 ENCOUNTER — NURSE ONLY (OUTPATIENT)
Dept: ALLERGY | Facility: CLINIC | Age: 39
End: 2019-10-14
Payer: COMMERCIAL

## 2019-10-14 DIAGNOSIS — J30.89 ENVIRONMENTAL AND SEASONAL ALLERGIES: ICD-10-CM

## 2019-10-14 PROCEDURE — 95117 IMMUNOTHERAPY INJECTIONS: CPT | Performed by: ALLERGY & IMMUNOLOGY

## 2019-10-16 ENCOUNTER — OFFICE VISIT (OUTPATIENT)
Dept: PHYSICAL THERAPY | Facility: HOSPITAL | Age: 39
End: 2019-10-16
Attending: INTERNAL MEDICINE
Payer: COMMERCIAL

## 2019-10-16 DIAGNOSIS — R51.9 NONINTRACTABLE EPISODIC HEADACHE, UNSPECIFIED HEADACHE TYPE: ICD-10-CM

## 2019-10-16 DIAGNOSIS — M48.02 CERVICAL SPINAL STENOSIS: ICD-10-CM

## 2019-10-16 DIAGNOSIS — M54.2 CERVICAL SPINE PAIN: ICD-10-CM

## 2019-10-16 PROCEDURE — 97110 THERAPEUTIC EXERCISES: CPT

## 2019-10-16 PROCEDURE — 97162 PT EVAL MOD COMPLEX 30 MIN: CPT

## 2019-10-16 NOTE — PROGRESS NOTES
CERVICAL SPINE EVALUATION:   Referring Physician: Isaac Conn MD    Date of Onset: 7/24/19 Date of Service: 10/16/2019   Diagnosis: Cervical spine pain (M54.2)  Nonintractable episodic headache, unspecified headache type (R51)  Cervical spinal • Pneumonia due to organism         Pt marked \"no\" for all questions relating to personal safety on intake questionaire   ASSESSMENT:   Miguel Herrera is a 46year old y/o male  who presents to therapy today with complaints of right side R Rotation 70     +pn slight to right   L Rotation  75     - for pain   Protrusion WNL    Retraction WNL      Repeated Motion Testing:   Repeated flexion cervical in sitting: No increase, no wrse  Repeated ext cervical in sitting: increased, no worse  Repe · Pt will improve painfree cervical AROM extension by 10+ degrees to improve tolerance for putting dishes into overhead cabinets   · Pt will improve cervical AROM rotation to right 5 > degrees to improve tolerance for turning head to check blind spot while

## 2019-10-21 ENCOUNTER — OFFICE VISIT (OUTPATIENT)
Dept: PHYSICAL THERAPY | Facility: HOSPITAL | Age: 39
End: 2019-10-21
Attending: INTERNAL MEDICINE
Payer: COMMERCIAL

## 2019-10-21 DIAGNOSIS — R51.9 NONINTRACTABLE EPISODIC HEADACHE, UNSPECIFIED HEADACHE TYPE: ICD-10-CM

## 2019-10-21 DIAGNOSIS — M48.02 CERVICAL SPINAL STENOSIS: ICD-10-CM

## 2019-10-21 DIAGNOSIS — M54.2 CERVICAL SPINE PAIN: ICD-10-CM

## 2019-10-21 PROCEDURE — 97140 MANUAL THERAPY 1/> REGIONS: CPT

## 2019-10-21 PROCEDURE — 97110 THERAPEUTIC EXERCISES: CPT

## 2019-10-21 NOTE — PROGRESS NOTES
Referring Physician: Wendy Aranda MD     Date of Onset: 7/24/19    Diagnosis: Cervical spine pain (M54.2)  Nonintractable episodic headache, unspecified headache type (R51)  Cervical spinal stenosis (M48.02)  Precautions: h/o cyclical vomitting, · Pt will improve cervical AROM rotation to right 5 > degrees to improve tolerance for turning head to check blind spot while driving  · Pt will have improved thoracic PA mobility to WNL to improve cervical ROM as well as promote upright posturing and decr

## 2019-10-22 ENCOUNTER — NURSE ONLY (OUTPATIENT)
Dept: ALLERGY | Facility: CLINIC | Age: 39
End: 2019-10-22
Payer: COMMERCIAL

## 2019-10-22 DIAGNOSIS — J30.89 ENVIRONMENTAL AND SEASONAL ALLERGIES: ICD-10-CM

## 2019-10-22 PROCEDURE — 95117 IMMUNOTHERAPY INJECTIONS: CPT | Performed by: ALLERGY & IMMUNOLOGY

## 2019-10-22 RX ORDER — AZELASTINE HYDROCHLORIDE, FLUTICASONE PROPIONATE 137; 50 UG/1; UG/1
1 SPRAY, METERED NASAL 2 TIMES DAILY
Qty: 1 BOTTLE | Refills: 0 | Status: SHIPPED | OUTPATIENT
Start: 2019-10-22 | End: 2019-11-19

## 2019-10-22 NOTE — TELEPHONE ENCOUNTER
Refill requested for:   Azelastine-Fluticasone (DYMISTA) 137-50 MCG/ACT Nasal Suspension              Si Squirt by Nasal route 2 (two) times daily.     Disp:  1 Bottle    Refills:  5    Start: 10/22/2019    Class: Normal    Non-formulary        Last off

## 2019-10-23 ENCOUNTER — OFFICE VISIT (OUTPATIENT)
Dept: PHYSICAL THERAPY | Facility: HOSPITAL | Age: 39
End: 2019-10-23
Attending: INTERNAL MEDICINE
Payer: COMMERCIAL

## 2019-10-23 PROCEDURE — 97140 MANUAL THERAPY 1/> REGIONS: CPT

## 2019-10-23 PROCEDURE — 97110 THERAPEUTIC EXERCISES: CPT

## 2019-10-28 ENCOUNTER — NURSE ONLY (OUTPATIENT)
Dept: ALLERGY | Facility: CLINIC | Age: 39
End: 2019-10-28
Payer: COMMERCIAL

## 2019-10-28 DIAGNOSIS — J30.89 ENVIRONMENTAL AND SEASONAL ALLERGIES: ICD-10-CM

## 2019-10-28 PROCEDURE — 95165 ANTIGEN THERAPY SERVICES: CPT | Performed by: ALLERGY & IMMUNOLOGY

## 2019-10-28 PROCEDURE — 95117 IMMUNOTHERAPY INJECTIONS: CPT | Performed by: ALLERGY & IMMUNOLOGY

## 2019-10-29 ENCOUNTER — TELEPHONE (OUTPATIENT)
Dept: HEMATOLOGY/ONCOLOGY | Facility: HOSPITAL | Age: 39
End: 2019-10-29

## 2019-10-29 NOTE — TELEPHONE ENCOUNTER
----- Message from Davis Galeano RN sent at 10/2/2019  1:31 PM CDT -----  Regarding: plse call for f/u  Plse call Guanaco Yrn and ask him to please schedule his MRI (authorized) again and make a f/u appt with Edith Cao.     Please tell him - appointments are ge

## 2019-11-04 ENCOUNTER — NURSE ONLY (OUTPATIENT)
Dept: ALLERGY | Facility: CLINIC | Age: 39
End: 2019-11-04
Payer: COMMERCIAL

## 2019-11-04 DIAGNOSIS — J30.89 ENVIRONMENTAL AND SEASONAL ALLERGIES: ICD-10-CM

## 2019-11-04 PROCEDURE — 95117 IMMUNOTHERAPY INJECTIONS: CPT | Performed by: ALLERGY & IMMUNOLOGY

## 2019-11-05 ENCOUNTER — OFFICE VISIT (OUTPATIENT)
Dept: PHYSICAL THERAPY | Facility: HOSPITAL | Age: 39
End: 2019-11-05
Attending: INTERNAL MEDICINE
Payer: COMMERCIAL

## 2019-11-05 PROCEDURE — 97140 MANUAL THERAPY 1/> REGIONS: CPT

## 2019-11-05 PROCEDURE — 97110 THERAPEUTIC EXERCISES: CPT

## 2019-11-05 NOTE — PROGRESS NOTES
Referring Physician: Janneth Garsia MD     Date of Onset: 7/24/19    Diagnosis: Cervical spine pain (M54.2)  Nonintractable episodic headache, unspecified headache type (R51)  Cervical spinal stenosis (M48.02)  Precautions: h/o cyclical vomitting, -Seated C-ret with ext x10  -Seated scap squeeze 15 x 5 sec hold (c/o tightness scapular  Muscle)    -Prone scap squeeze 2 x 10 with 5 sec hold (c/o spine discomfort)  -Prone ext x15 (c/o tired)  -Prone abd x 10 (c/o shaky)  -Prone rowing x 15 (tired and f · Pt will have improved thoracic PA mobility to WNL to improve cervical ROM as well as promote upright posturing and decreased pain with posture correction attempts and work station  · Pt will report decreased max pain to 1-2/10 90% or greater of time.   ·

## 2019-11-07 ENCOUNTER — APPOINTMENT (OUTPATIENT)
Dept: PHYSICAL THERAPY | Facility: HOSPITAL | Age: 39
End: 2019-11-07
Attending: INTERNAL MEDICINE
Payer: COMMERCIAL

## 2019-11-07 ENCOUNTER — TELEPHONE (OUTPATIENT)
Dept: PHYSICAL THERAPY | Facility: HOSPITAL | Age: 39
End: 2019-11-07

## 2019-11-11 ENCOUNTER — APPOINTMENT (OUTPATIENT)
Dept: PHYSICAL THERAPY | Facility: HOSPITAL | Age: 39
End: 2019-11-11
Attending: INTERNAL MEDICINE
Payer: COMMERCIAL

## 2019-11-11 ENCOUNTER — TELEPHONE (OUTPATIENT)
Dept: HEMATOLOGY/ONCOLOGY | Facility: HOSPITAL | Age: 39
End: 2019-11-11

## 2019-11-11 NOTE — TELEPHONE ENCOUNTER
LM reminding Ramandeep Aleman to please schedule the MRI that Dr Reeves had ordered for him, central scheduling phone number provided. Also, then please call our office to schedule a follow up appointment to review the MRI, phone number provided.

## 2019-11-12 ENCOUNTER — OFFICE VISIT (OUTPATIENT)
Dept: PHYSICAL THERAPY | Facility: HOSPITAL | Age: 39
End: 2019-11-12
Attending: INTERNAL MEDICINE
Payer: COMMERCIAL

## 2019-11-12 ENCOUNTER — NURSE ONLY (OUTPATIENT)
Dept: ALLERGY | Facility: CLINIC | Age: 39
End: 2019-11-12
Payer: COMMERCIAL

## 2019-11-12 DIAGNOSIS — J30.89 ENVIRONMENTAL AND SEASONAL ALLERGIES: ICD-10-CM

## 2019-11-12 PROCEDURE — 95117 IMMUNOTHERAPY INJECTIONS: CPT | Performed by: ALLERGY & IMMUNOLOGY

## 2019-11-12 PROCEDURE — 97110 THERAPEUTIC EXERCISES: CPT

## 2019-11-14 ENCOUNTER — OFFICE VISIT (OUTPATIENT)
Dept: PHYSICAL THERAPY | Facility: HOSPITAL | Age: 39
End: 2019-11-14
Attending: INTERNAL MEDICINE
Payer: COMMERCIAL

## 2019-11-14 PROCEDURE — 97110 THERAPEUTIC EXERCISES: CPT

## 2019-11-14 NOTE — PROGRESS NOTES
Referring Physician: Cammy Moreno MD     Date of Onset: 7/24/19    Diagnosis: Cervical spine pain (M54.2)  Nonintractable episodic headache, unspecified headache type (R51)  Cervical spinal stenosis (M48.02)  Precautions: h/o cyclical vomitting, -Prone rowing x 15 (tired and fatigued)    -Lie on 1/2 foam roll x2 min  -Lie on 1/2 foam roll; chest press, flexion, pac stretch x 15 each exercises         EX:    -Seated posture education  -Seated C-ret x 10  -Seated Cervical OP x 10  -Seated C-rot R/L Assessment: Patient has continued faulty posture, Posture training additional needed.  Patient most right lower pinching, Patient decreased scapular strength, c/o tired and fatigued with scapular strengthening exercises, added extension based cervical/thora

## 2019-11-19 ENCOUNTER — NURSE ONLY (OUTPATIENT)
Dept: ALLERGY | Facility: CLINIC | Age: 39
End: 2019-11-19
Payer: COMMERCIAL

## 2019-11-19 ENCOUNTER — OFFICE VISIT (OUTPATIENT)
Dept: ALLERGY | Facility: CLINIC | Age: 39
End: 2019-11-19
Payer: COMMERCIAL

## 2019-11-19 VITALS
BODY MASS INDEX: 21.14 KG/M2 | RESPIRATION RATE: 16 BRPM | TEMPERATURE: 100 F | OXYGEN SATURATION: 98 % | WEIGHT: 151 LBS | SYSTOLIC BLOOD PRESSURE: 143 MMHG | HEART RATE: 99 BPM | DIASTOLIC BLOOD PRESSURE: 103 MMHG | HEIGHT: 71 IN

## 2019-11-19 DIAGNOSIS — J30.89 ENVIRONMENTAL AND SEASONAL ALLERGIES: ICD-10-CM

## 2019-11-19 DIAGNOSIS — J34.2 NASAL SEPTAL DEVIATION: ICD-10-CM

## 2019-11-19 DIAGNOSIS — J30.89 PERENNIAL ALLERGIC RHINITIS: Primary | ICD-10-CM

## 2019-11-19 PROCEDURE — 99214 OFFICE O/P EST MOD 30 MIN: CPT | Performed by: ALLERGY & IMMUNOLOGY

## 2019-11-19 PROCEDURE — 95117 IMMUNOTHERAPY INJECTIONS: CPT | Performed by: ALLERGY & IMMUNOLOGY

## 2019-11-19 RX ORDER — AZELASTINE HYDROCHLORIDE, FLUTICASONE PROPIONATE 137; 50 UG/1; UG/1
1 SPRAY, METERED NASAL 2 TIMES DAILY
Qty: 3 BOTTLE | Refills: 1 | Status: SHIPPED | OUTPATIENT
Start: 2019-11-19 | End: 2021-12-08

## 2019-11-19 RX ORDER — MONTELUKAST SODIUM 10 MG/1
10 TABLET ORAL NIGHTLY
Qty: 90 TABLET | Refills: 1 | Status: SHIPPED | OUTPATIENT
Start: 2019-11-19 | End: 2020-06-01

## 2019-11-19 RX ORDER — LEVOCETIRIZINE DIHYDROCHLORIDE 5 MG/1
5 TABLET, FILM COATED ORAL NIGHTLY
Qty: 90 TABLET | Refills: 1 | Status: SHIPPED | OUTPATIENT
Start: 2019-11-19 | End: 2020-06-01

## 2019-11-19 NOTE — PATIENT INSTRUCTIONS
Recs: Continue with current regimen including immunotherapy to trees weeds cats and dogs  Continue with medications including Xyzal Singulair Dymista  Reviewed avoidance measures  Trial of sinus rinses if refractory  Follow-up in 1 year or sooner if needed

## 2019-11-19 NOTE — PROGRESS NOTES
Freddie Bravo is a 46year old male. HPI:   Patient presents with: Allergies: Routine F/u for AR with AIT. Patient is a 26-year-old male who presents for follow-up with a chief complaint of allergies. Patient last seen by me in May 2019.   Darby Drinks per session: 3 or 4      Binge frequency: Weekly      Comment: about 6 oz of vodka everyday; drinks more during the weekend     Drug use: No       Medications (Active prior to today's visit):  Current Outpatient Medications   Medication Sig Dispens Eyes:  Negative for eye discharge and vision loss  Gastrointestinal:  Negative for abdominal pain, diarrhea and vomiting  Integumentary:  Negative for pruritus and rash  Respiratory:  Negative for cough, dyspnea and wheezing    PHYSICAL EXAM:   [facility] & Referrals:  None     11/19/2019  Hoda Louise MD    If medication samples were provided today, they were provided solely for patient education and training related to self administration of these medications.   Teaching, instruction and

## 2019-11-20 ENCOUNTER — APPOINTMENT (OUTPATIENT)
Dept: PHYSICAL THERAPY | Facility: HOSPITAL | Age: 39
End: 2019-11-20
Attending: INTERNAL MEDICINE
Payer: COMMERCIAL

## 2019-11-20 PROCEDURE — 97110 THERAPEUTIC EXERCISES: CPT

## 2019-11-20 NOTE — PROGRESS NOTES
Referring Physician: Justine Young MD  [facility] SUMMARY  Date of Onset: 7/24/19    Diagnosis: Cervical spine pain (M54.2)  Nonintractable episodic headache, unspecified headache type (R51)  Cervical spinal stenosis (M48.02)  Precautions: h/o cyclical · Pt will improve painfree cervical AROM extension by 10+ degrees to improve tolerance for putting dishes into overhead cabinets -Partially MET  · Pt will improve cervical AROM rotation to right 5 > degrees to improve tolerance for turning head to check bl -Wall slide x 15 (c/o right LE pinching)    -Prone scap squeeze 2 x 10 with 5 sec hold (c/o spine discomfort)  -Prone ext x15 (c/o tired)  -Prone T, W, Y, x15 ea (c/o tired and fatigued)     EX:    -Seated posture education  -Seated cervical ROM x 10 ea di

## 2019-11-23 DIAGNOSIS — Z72.89 ALCOHOL USE: ICD-10-CM

## 2019-11-25 ENCOUNTER — NURSE ONLY (OUTPATIENT)
Dept: ALLERGY | Facility: CLINIC | Age: 39
End: 2019-11-25
Payer: COMMERCIAL

## 2019-11-25 DIAGNOSIS — J30.89 ENVIRONMENTAL AND SEASONAL ALLERGIES: ICD-10-CM

## 2019-11-25 PROCEDURE — 95117 IMMUNOTHERAPY INJECTIONS: CPT | Performed by: ALLERGY & IMMUNOLOGY

## 2019-11-25 RX ORDER — OMEPRAZOLE 20 MG/1
CAPSULE, DELAYED RELEASE ORAL
Qty: 30 CAPSULE | Refills: 0 | Status: SHIPPED | OUTPATIENT
Start: 2019-11-25 | End: 2020-01-09

## 2019-12-02 ENCOUNTER — NURSE ONLY (OUTPATIENT)
Dept: ALLERGY | Facility: CLINIC | Age: 39
End: 2019-12-02
Payer: COMMERCIAL

## 2019-12-02 DIAGNOSIS — J30.89 ENVIRONMENTAL AND SEASONAL ALLERGIES: ICD-10-CM

## 2019-12-02 PROCEDURE — 95117 IMMUNOTHERAPY INJECTIONS: CPT | Performed by: ALLERGY & IMMUNOLOGY

## 2019-12-02 PROCEDURE — 95165 ANTIGEN THERAPY SERVICES: CPT | Performed by: ALLERGY & IMMUNOLOGY

## 2019-12-06 ENCOUNTER — OFFICE VISIT (OUTPATIENT)
Dept: GASTROENTEROLOGY | Facility: CLINIC | Age: 39
End: 2019-12-06
Payer: COMMERCIAL

## 2019-12-06 VITALS
BODY MASS INDEX: 20.97 KG/M2 | DIASTOLIC BLOOD PRESSURE: 88 MMHG | HEIGHT: 71 IN | HEART RATE: 112 BPM | SYSTOLIC BLOOD PRESSURE: 122 MMHG | WEIGHT: 149.81 LBS

## 2019-12-06 DIAGNOSIS — K70.10 ALCOHOLIC HEPATITIS WITHOUT ASCITES: ICD-10-CM

## 2019-12-06 DIAGNOSIS — K21.9 GASTROESOPHAGEAL REFLUX DISEASE, ESOPHAGITIS PRESENCE NOT SPECIFIED: ICD-10-CM

## 2019-12-06 DIAGNOSIS — R11.15 INTRACTABLE CYCLICAL VOMITING WITH NAUSEA: ICD-10-CM

## 2019-12-06 DIAGNOSIS — R11.15 CYCLICAL VOMITING WITH NAUSEA: Primary | ICD-10-CM

## 2019-12-06 PROCEDURE — 99213 OFFICE O/P EST LOW 20 MIN: CPT | Performed by: INTERNAL MEDICINE

## 2019-12-06 RX ORDER — AMITRIPTYLINE HYDROCHLORIDE 10 MG/1
10 TABLET, FILM COATED ORAL NIGHTLY
Qty: 30 TABLET | Refills: 0 | Status: SHIPPED | OUTPATIENT
Start: 2019-12-06 | End: 2020-01-02

## 2019-12-06 RX ORDER — ONDANSETRON 4 MG/1
4 TABLET, ORALLY DISINTEGRATING ORAL EVERY 8 HOURS PRN
Qty: 30 TABLET | Refills: 11 | Status: ON HOLD | OUTPATIENT
Start: 2019-12-06 | End: 2020-07-30

## 2019-12-06 NOTE — PROGRESS NOTES
HPI:    Patient ID: Marylee Collie returns today for follow-up.         Recent labs 1/4/2020 ordered by PCP showed impressive spike in AST and ALT to 1593 and 602, resolved on evaluation for pleuritic chest pain in the ED on 1/21/2020.   No new prescription 04/24/19 : 142 lb (64.4 kg)  04/10/19 : 140 lb (63.5 kg)  03/11/19 : 137 lb 12.8 oz (62.5 kg)  12/17/18 : 135 lb (61.2 kg)  11/12/18 : 144 lb (65.3 kg)  09/16/18 : 150 lb (68 kg)       ====================  Previous visit 9/9/2019:    Sree ivan There is no height or weight on file to calculate BMI.        ====================  Previous visit 3/11/2019:    Sree Aiken returns today for follow-up. Overall, things are about the same. A bit less stress at home, Marcell's wife is doing better. As below, 2 recent Emergency Room visits 7/24/2018 and 9/16/2018 for intractable vomiting. The episode of July was milder, fairly quick recovery.   The episode of September was fairly severe and he describes little or no appetite for about 1 month afterwar The patient presents to the ED complaining of constant vomiting for the past 8 hours. He states that he has a history of cyclical vomiting and had recurrence of symptoms today. Ongoing vomiting without significant nausea.   Patient now feels severely dehy Recent constipation past several weeks, now resolving. This is a new concern. No previous discussion of constipation. No full vomiting episodes since Labor Day and before that late May early June 2017.     Ongoing alcohol consumption, still better than No full vomiting episodes since Labor Day and before that late May early June 2017. Orzaida Carver describes a vague nausea, sense of imminent gagging or vomiting up into the neck region for the past week as if an episode of vomiting may be coming.   Drinking a gl There are 2 concerns here, a frequent nausea, hypersensitivity to food odors as previously described causing a very poor appetite, difficulty keeping up with caloric intake; and rarely the full cyclic vomiting syndrome.   As per inpatient notes, Cecilia Cabrera -Cdiff positive, d/w patient he continues to have diarrhea, but non-toxic symptoms. No fevers. No sig ab pain.  -Will start flagyl 500mg TID x14 days  -Encouraged careful handwashing and isolating himself to one toilet at home. Use bleach to wipe surfaces. Patient is seen in office follow-up. He is familiar to me from a previous office visit on 10/5/2017. At that time he was seen for a hospital follow-up due to cyclical nausea and vomiting and severe weakness.   Patient was followed by Dr. Low Marcos while Lilliam Hernandez Patient states he did not follow-up with [place] per our last office visit. He states he received a phone call but does not answer his phone very much due to possible scam calls. Unfortunately, he has continued to drink alcohol.   He has cut back some 1.  Nausea/vomiting/alcohol abuse/altered bowel habits/abdominal cramping: Physical exam unremarkable. Patient has not been taking probiotics and CoQ10 as he was previously. I have encouraged him to restart these.   His acute symptoms may be due to possib Date of Admission:  9/1/2017  Date of Consult:  9/3/2017  PCP: Yaa Jones  Reason for Consultation:   Intractable vomiting     History of Present Illness:      15-year-old man with history of question of hemochromatosis history in Epic chart; landon Feeling much better today. Has tolerated solid meals all day yesterday and this morning.   Still weak, some abdominal and back pain.     During conversations with Dr. Tony Butterfield and others this admission, Mr Senait Farley is describing \"cyclical vomiting\" every 3-6 m · Absolute importance of alcohol cessation discussed to clarify the nature of this vomiting and more importantly the liver disease. Alcohol's effect on ferritin level will make that difficult to interpret.   Liver biopsy similarly will not be helpful in th GALLBLADDER:            Normal size. No calculi, wall thickening or pericholecystic fluid. Sonographic Rush's sign was not elicited. BILE DUCTS:    Normal.  Common bile duct measures 3 mm. PANCREAS:      The visualized pancreas is unremarkable.   OT No airspace consolidation, pleural effusion, or pneumothorax is detected. AIRWAYS:         The tracheobronchial tree is without central mass or obstructing lesion. MEDIASTINUM/BUSHRA:    No mass or lymphadenopathy.     CHEST WALL:  No axillar • Cholecalciferol (VITAMIN D3) 1000 units Oral Cap Take 1 tablet by mouth daily. • Multiple Vitamin-Folic Acid Oral Tab Take 1 tablet by mouth daily.        • Metoclopramide HCl 10 MG Oral Tab Take 1-2 tablets (10-20 mg total) by mouth every 6 (six) camilla 46year old gentleman with complex history of daily alcohol consumption, previous concern for alcohol abuse; cyclic vomiting syndrome; abnormal iron studies in the setting of excessive alcohol consumption.     Previous testing has raised the question of he Continue dissolving Zofran tablets, Compazine suppositories as needed for the nausea.     Alcohol cessation again discussed at length in detail 3/11/2019 and 9/9/2019 and 12/6/2019.   Seeing a therapist.     Abnormal LFTs: previous duodenal biopsies were no 4.  Question of hemochromatosis. 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 Emely Dumont of hematology.   Mr Ambar Burnett was previously a blood don

## 2019-12-09 ENCOUNTER — TELEPHONE (OUTPATIENT)
Dept: HEMATOLOGY/ONCOLOGY | Facility: HOSPITAL | Age: 39
End: 2019-12-09

## 2019-12-09 ENCOUNTER — NURSE ONLY (OUTPATIENT)
Dept: ALLERGY | Facility: CLINIC | Age: 39
End: 2019-12-09
Payer: COMMERCIAL

## 2019-12-09 DIAGNOSIS — J30.89 ENVIRONMENTAL AND SEASONAL ALLERGIES: ICD-10-CM

## 2019-12-09 PROCEDURE — 95117 IMMUNOTHERAPY INJECTIONS: CPT | Performed by: ALLERGY & IMMUNOLOGY

## 2019-12-09 NOTE — TELEPHONE ENCOUNTER
ARELI (4th attempt) reminding Carole Kurtz that he is over due for ordered and authorized MRI and f/u appointment with Dr Katlin Kilgore. Phone number provided to schedule.

## 2019-12-16 ENCOUNTER — NURSE ONLY (OUTPATIENT)
Dept: ALLERGY | Facility: CLINIC | Age: 39
End: 2019-12-16
Payer: COMMERCIAL

## 2019-12-16 DIAGNOSIS — J30.89 ENVIRONMENTAL AND SEASONAL ALLERGIES: ICD-10-CM

## 2019-12-16 PROCEDURE — 95117 IMMUNOTHERAPY INJECTIONS: CPT | Performed by: ALLERGY & IMMUNOLOGY

## 2019-12-30 ENCOUNTER — NURSE ONLY (OUTPATIENT)
Dept: ALLERGY | Facility: CLINIC | Age: 39
End: 2019-12-30
Payer: COMMERCIAL

## 2019-12-30 DIAGNOSIS — J30.89 ENVIRONMENTAL AND SEASONAL ALLERGIES: ICD-10-CM

## 2019-12-30 PROCEDURE — 95117 IMMUNOTHERAPY INJECTIONS: CPT | Performed by: ALLERGY & IMMUNOLOGY

## 2020-01-02 NOTE — TELEPHONE ENCOUNTER
LOV 12/6/19  F/U scheduled 3/30/20  Last filled 12/6/19 by Dr. Baldo Richardson Pl x 30 day supply.    Please advise on refill request.

## 2020-01-04 ENCOUNTER — LAB ENCOUNTER (OUTPATIENT)
Dept: LAB | Facility: HOSPITAL | Age: 40
End: 2020-01-04
Attending: INTERNAL MEDICINE
Payer: COMMERCIAL

## 2020-01-04 DIAGNOSIS — F10.10 CHRONIC ALCOHOL ABUSE: ICD-10-CM

## 2020-01-04 DIAGNOSIS — E88.09 HYPOALBUMINEMIA: ICD-10-CM

## 2020-01-04 DIAGNOSIS — Z78.9 HEAVY ALCOHOL USE: ICD-10-CM

## 2020-01-04 DIAGNOSIS — E83.110 HEREDITARY HEMOCHROMATOSIS (HCC): ICD-10-CM

## 2020-01-04 DIAGNOSIS — D75.89 MACROCYTOSIS: ICD-10-CM

## 2020-01-04 DIAGNOSIS — E83.39 HYPOPHOSPHATEMIA: ICD-10-CM

## 2020-01-04 DIAGNOSIS — D53.9 MACROCYTIC ANEMIA: ICD-10-CM

## 2020-01-04 LAB
ALBUMIN SERPL-MCNC: 3.9 G/DL (ref 3.4–5)
ALBUMIN/GLOB SERPL: 0.9 {RATIO} (ref 1–2)
ALP LIVER SERPL-CCNC: 60 U/L (ref 45–117)
ALT SERPL-CCNC: 602 U/L (ref 16–61)
ANION GAP SERPL CALC-SCNC: 9 MMOL/L (ref 0–18)
APTT PPP: 27.2 SECONDS (ref 23.2–35.3)
AST SERPL-CCNC: 1593 U/L (ref 15–37)
BASOPHILS # BLD AUTO: 0.03 X10(3) UL (ref 0–0.2)
BASOPHILS NFR BLD AUTO: 1.1 %
BILIRUB SERPL-MCNC: 2.2 MG/DL (ref 0.1–2)
BUN BLD-MCNC: 6 MG/DL (ref 7–18)
BUN/CREAT SERPL: 5 (ref 10–20)
CALCIUM BLD-MCNC: 9.8 MG/DL (ref 8.5–10.1)
CHLORIDE SERPL-SCNC: 93 MMOL/L (ref 98–112)
CO2 SERPL-SCNC: 30 MMOL/L (ref 21–32)
CREAT BLD-MCNC: 1.21 MG/DL (ref 0.7–1.3)
DEPRECATED RDW RBC AUTO: 42.7 FL (ref 35.1–46.3)
EOSINOPHIL # BLD AUTO: 0.09 X10(3) UL (ref 0–0.7)
EOSINOPHIL NFR BLD AUTO: 3.2 %
ERYTHROCYTE [DISTWIDTH] IN BLOOD BY AUTOMATED COUNT: 11.9 % (ref 11–15)
FOLATE SERPL-MCNC: 18.7 NG/ML (ref 8.7–?)
GLOBULIN PLAS-MCNC: 4.3 G/DL (ref 2.8–4.4)
GLUCOSE BLD-MCNC: 85 MG/DL (ref 70–99)
HCT VFR BLD AUTO: 38.2 % (ref 39–53)
HGB BLD-MCNC: 13.2 G/DL (ref 13–17.5)
IMM GRANULOCYTES # BLD AUTO: 0 X10(3) UL (ref 0–1)
IMM GRANULOCYTES NFR BLD: 0 %
INR BLD: 0.97 (ref 0.9–1.2)
LYMPHOCYTES # BLD AUTO: 1.07 X10(3) UL (ref 1–4)
LYMPHOCYTES NFR BLD AUTO: 37.8 %
M PROTEIN MFR SERPL ELPH: 8.2 G/DL (ref 6.4–8.2)
MCH RBC QN AUTO: 33.6 PG (ref 26–34)
MCHC RBC AUTO-ENTMCNC: 34.6 G/DL (ref 31–37)
MCV RBC AUTO: 97.2 FL (ref 80–100)
MONOCYTES # BLD AUTO: 0.38 X10(3) UL (ref 0.1–1)
MONOCYTES NFR BLD AUTO: 13.4 %
NEUTROPHILS # BLD AUTO: 1.26 X10 (3) UL (ref 1.5–7.7)
NEUTROPHILS # BLD AUTO: 1.26 X10(3) UL (ref 1.5–7.7)
NEUTROPHILS NFR BLD AUTO: 44.5 %
OSMOLALITY SERPL CALC.SUM OF ELEC: 271 MOSM/KG (ref 275–295)
PATIENT FASTING Y/N/NP: YES
PHOSPHATE SERPL-MCNC: 4.4 MG/DL (ref 2.5–4.9)
PLATELET # BLD AUTO: 167 10(3)UL (ref 150–450)
POTASSIUM SERPL-SCNC: 3.4 MMOL/L (ref 3.5–5.1)
PROTHROMBIN TIME: 12.7 SECONDS (ref 11.8–14.5)
RBC # BLD AUTO: 3.93 X10(6)UL (ref 4.3–5.7)
SODIUM SERPL-SCNC: 132 MMOL/L (ref 136–145)
VIT B12 SERPL-MCNC: 1220 PG/ML (ref 193–986)
WBC # BLD AUTO: 2.8 X10(3) UL (ref 4–11)

## 2020-01-04 PROCEDURE — 80053 COMPREHEN METABOLIC PANEL: CPT

## 2020-01-04 PROCEDURE — 36415 COLL VENOUS BLD VENIPUNCTURE: CPT

## 2020-01-04 PROCEDURE — 85730 THROMBOPLASTIN TIME PARTIAL: CPT

## 2020-01-04 PROCEDURE — 84100 ASSAY OF PHOSPHORUS: CPT

## 2020-01-04 PROCEDURE — 85025 COMPLETE CBC W/AUTO DIFF WBC: CPT

## 2020-01-04 PROCEDURE — 85610 PROTHROMBIN TIME: CPT

## 2020-01-04 PROCEDURE — 82746 ASSAY OF FOLIC ACID SERUM: CPT

## 2020-01-04 PROCEDURE — 84425 ASSAY OF VITAMIN B-1: CPT

## 2020-01-04 PROCEDURE — 82607 VITAMIN B-12: CPT

## 2020-01-06 ENCOUNTER — TELEPHONE (OUTPATIENT)
Dept: HEMATOLOGY/ONCOLOGY | Facility: HOSPITAL | Age: 40
End: 2020-01-06

## 2020-01-06 NOTE — TELEPHONE ENCOUNTER
Called LM on  to call and schedule MRI - number provided for central scheduling who can assist with scheduling MRI. Let patient know scan was approved through insurance and that he will need a follow up after scan to review the results.   Included phone

## 2020-01-07 RX ORDER — AMITRIPTYLINE HYDROCHLORIDE 10 MG/1
TABLET, FILM COATED ORAL
Qty: 90 TABLET | Refills: 1 | Status: SHIPPED | OUTPATIENT
Start: 2020-01-07 | End: 2020-12-07

## 2020-01-09 ENCOUNTER — OFFICE VISIT (OUTPATIENT)
Dept: INTERNAL MEDICINE CLINIC | Facility: CLINIC | Age: 40
End: 2020-01-09
Payer: COMMERCIAL

## 2020-01-09 DIAGNOSIS — Z23 IMMUNIZATION DUE: ICD-10-CM

## 2020-01-09 DIAGNOSIS — Z72.89 CHRONIC ALCOHOL USE: ICD-10-CM

## 2020-01-09 DIAGNOSIS — R00.0 TACHYCARDIA: ICD-10-CM

## 2020-01-09 DIAGNOSIS — K76.0 FATTY LIVER: ICD-10-CM

## 2020-01-09 DIAGNOSIS — E53.8 FOLATE DEFICIENCY: ICD-10-CM

## 2020-01-09 DIAGNOSIS — M48.02 CERVICAL SPINAL STENOSIS: ICD-10-CM

## 2020-01-09 DIAGNOSIS — E83.119 HEMOCHROMATOSIS, UNSPECIFIED HEMOCHROMATOSIS TYPE: Primary | ICD-10-CM

## 2020-01-09 DIAGNOSIS — K70.10 ALCOHOLIC HEPATITIS WITHOUT ASCITES: ICD-10-CM

## 2020-01-09 DIAGNOSIS — G61.9 INFLAMMATORY NEUROPATHY (HCC): ICD-10-CM

## 2020-01-09 DIAGNOSIS — D70.8 OTHER NEUTROPENIA (HCC): ICD-10-CM

## 2020-01-09 DIAGNOSIS — R73.9 HYPERGLYCEMIA: ICD-10-CM

## 2020-01-09 DIAGNOSIS — K21.9 GASTROESOPHAGEAL REFLUX DISEASE, ESOPHAGITIS PRESENCE NOT SPECIFIED: ICD-10-CM

## 2020-01-09 DIAGNOSIS — E87.1 HYPONATREMIA WITH DECREASED SERUM OSMOLALITY: ICD-10-CM

## 2020-01-09 DIAGNOSIS — E87.6 HYPOKALEMIA: ICD-10-CM

## 2020-01-09 LAB — VITAMIN B1 (THIAMINE), WHOLE B: 57 NMOL/L

## 2020-01-09 PROCEDURE — 99395 PREV VISIT EST AGE 18-39: CPT | Performed by: INTERNAL MEDICINE

## 2020-01-09 RX ORDER — OMEPRAZOLE 40 MG/1
40 CAPSULE, DELAYED RELEASE ORAL DAILY
Qty: 45 CAPSULE | Refills: 0 | Status: SHIPPED | OUTPATIENT
Start: 2020-01-09 | End: 2020-04-21

## 2020-01-09 NOTE — PATIENT INSTRUCTIONS
Followup 1 week for nurse visit for vitals  Discuss situation with wife and counselor  Eat 3 meals/day  Discharge Instructions for Hereditary Hemochromatosis  You have been diagnosed with hereditary hemochromatosis ([facility]).  This is an inherited disease that c (jaundice)   Date Last Reviewed: 2/1/2017  © 6047-2894 [employer]. [address] [facility], [address] [place]. All rights reserved. This information is not intended as a substitute for professional medical care.  Always follow your healthcare measured with a blood test. High levels of AST may be a sign of liver injury, especially if the ALT level is also high. · Bilirubin. This is a liver function test. It measures the yellow substance made when the body breaks down red blood cells.  Bilirubin this may mean the liver is not working properly. · Infectious hepatitis. This is a disease and can be found with antibody and antigen tests for Hepatitis A, B, C, D, and E.  · PT (prothrombin time), also called INR (international normalized ratio).  This c inflammation, scarring, or other problems. · CT scan. A CT scan is a series of X-rays that make a 3-D picture of the liver and gallbladder. This can show gallstones, abscesses, abnormal blood vessels, or tumors.   · ERCP (endoscopic retrograde cholangiopan substitute for professional medical care. Always follow your healthcare professional's instructions. Signs of Alcohol Addiction (Alcoholism)  Do you want to have more fun, to fit in, to cope better with your problems?  It’s as easy as taking a drink— [place] [address]. All rights reserved. This information is not intended as a substitute for professional medical care. Always follow your healthcare professional's instructions.         Understanding Alcoholism  Alcoholism is a disease in which a person is dependen the most common forms of this is denial. This is when the person denies that drinking is a problem. He or she may deny that any of the problems in his or her life are caused by drinking. Date Last Reviewed: 6/1/2016  © 1014-9972 [employer] [place] [employer]. the blood vessels stiff. This causes high blood pressure. All of these problems raise your risk of having a heart attack or stroke. Liver  Alcohol causes fat to build up in the liver. This affects how the liver works.  Alcohol also raises the risk for h drink.  Follow-up care  Follow up with your healthcare provider, or as advised. Contact these groups to get help:  · Alcoholics Anonymous (AA). Go to www.aa.org. Or check the phone book for meetings near you.   · National Alcohol and Substance Abuse Informa

## 2020-01-09 NOTE — PROGRESS NOTES
Carl Villa is a 46year old male.   Patient presents with:  Physical: pt in for CPX       HPI:         All December, not feeling well, felt nauseated,  vomiting liquids, stating gator irving and bile, no blood or coffee grounds; had increased gag reflex, • prochlorperazine (COMPAZINE) 25 MG Rectal Suppos Place 1 suppository (25 mg total) rectally every 12 (twelve) hours as needed for Nausea.  10 suppository 0        History:  Past Medical History:   Diagnosis Date   • Allergic rhinitis    • Arthritis    • B HEME: denies excessive bruising or bleeding      Physical Exam:   01/09/20  0915   BP: 110/80   Pulse: 104   Resp: 20   Temp: 98.1 °F (36.7 °C)   TempSrc: Oral   SpO2: 99%   Weight: 142 lb (64.4 kg)   Height: 72\"     Body mass index is 19.26 kg/m².   Harsha Globulin  4.3 2.8 - 4.4 g/dL    A/G Ratio 0.9 (L) 1.0 - 2.0    FASTING Yes    PHOSPHORUS   Result Value Ref Range    Phosphorus 4.4 2.5 - 4.9 mg/dL   PROTHROMBIN TIME (PT)   Result Value Ref Range    PT 12.7 11.8 - 14.5 seconds    INR 0.97 0.90 - 1.20   P (M48.02) Cervical spinal stenosis  Plan:no c/o today    (K21.9) Gastroesophageal reflux disease, esophagitis presence not specified  Plan: Omeprazole 40 MG Oral Capsule Delayed Release      (E53.8) Folate deficiency  Plan:resolved    (K76.0) Fatty liver · Tell your children and your brothers and sisters that you have hemochromatosis. The disease is inherited. So other family members may have it and not know it.  Your first-degree family members should talk to their healthcare providers about the need for b This sheet describes tests that may be done for liver problems. Your healthcare provider will tell you which tests you need.   Blood tests to check the liver  A small amount of blood may be taken and tested for one or more of the following:  · AFP (alpha fe · Bilirubin. This is a liver function test. It measures the yellow substance made when the body breaks down red blood cells. Bilirubin is collected by the liver to be sent out of the body with stool.  When something is wrong with the liver or bile ducts, bi · Infectious hepatitis. This is a disease and can be found with antibody and antigen tests for Hepatitis A, B, C, D, and E.  · PT (prothrombin time), also called INR (international normalized ratio). This checks how long it takes for blood to form clots.  Dahlia Elizondo · CT scan. A CT scan is a series of X-rays that make a 3-D picture of the liver and gallbladder. This can show gallstones, abscesses, abnormal blood vessels, or tumors. · ERCP (endoscopic retrograde cholangiopancreatography).  This test can show if the gifty © 2873-7076 [employer] [address]. [address] [facility], [facility] [place]. All rights reserved. This information is not intended as a substitute for professional medical care. Always follow your healthcare professional's instructions.             No

## 2020-01-10 VITALS
OXYGEN SATURATION: 99 % | TEMPERATURE: 98 F | RESPIRATION RATE: 20 BRPM | SYSTOLIC BLOOD PRESSURE: 110 MMHG | HEART RATE: 96 BPM | BODY MASS INDEX: 19.23 KG/M2 | WEIGHT: 142 LBS | HEIGHT: 72 IN | DIASTOLIC BLOOD PRESSURE: 80 MMHG

## 2020-01-13 ENCOUNTER — NURSE ONLY (OUTPATIENT)
Dept: ALLERGY | Facility: CLINIC | Age: 40
End: 2020-01-13
Payer: COMMERCIAL

## 2020-01-13 DIAGNOSIS — J30.89 ENVIRONMENTAL AND SEASONAL ALLERGIES: ICD-10-CM

## 2020-01-13 PROCEDURE — 95117 IMMUNOTHERAPY INJECTIONS: CPT | Performed by: ALLERGY & IMMUNOLOGY

## 2020-01-15 ENCOUNTER — TELEPHONE (OUTPATIENT)
Dept: HEMATOLOGY/ONCOLOGY | Facility: HOSPITAL | Age: 40
End: 2020-01-15

## 2020-01-15 NOTE — TELEPHONE ENCOUNTER
Attempted to reach patient again- only able to reach VM- again LM regarding importance of follow p and of scheduling the MRI that was ordered. Provided number for call back if he has questions.

## 2020-01-21 ENCOUNTER — APPOINTMENT (OUTPATIENT)
Dept: CT IMAGING | Facility: HOSPITAL | Age: 40
End: 2020-01-21
Attending: EMERGENCY MEDICINE
Payer: COMMERCIAL

## 2020-01-21 ENCOUNTER — APPOINTMENT (OUTPATIENT)
Dept: GENERAL RADIOLOGY | Facility: HOSPITAL | Age: 40
End: 2020-01-21
Attending: EMERGENCY MEDICINE
Payer: COMMERCIAL

## 2020-01-21 ENCOUNTER — HOSPITAL ENCOUNTER (EMERGENCY)
Facility: HOSPITAL | Age: 40
Discharge: HOME | End: 2020-01-21
Attending: EMERGENCY MEDICINE
Payer: COMMERCIAL

## 2020-01-21 VITALS
HEART RATE: 93 BPM | RESPIRATION RATE: 14 BRPM | WEIGHT: 145 LBS | HEIGHT: 72 IN | TEMPERATURE: 98 F | DIASTOLIC BLOOD PRESSURE: 104 MMHG | OXYGEN SATURATION: 100 % | SYSTOLIC BLOOD PRESSURE: 132 MMHG | BODY MASS INDEX: 19.64 KG/M2

## 2020-01-21 DIAGNOSIS — R07.89 CHEST PAIN, ATYPICAL: Primary | ICD-10-CM

## 2020-01-21 LAB
ALBUMIN SERPL-MCNC: 3.8 G/DL (ref 3.4–5)
ALP LIVER SERPL-CCNC: 64 U/L (ref 45–117)
ALT SERPL-CCNC: 118 U/L (ref 16–61)
ANION GAP SERPL CALC-SCNC: 9 MMOL/L (ref 0–18)
AST SERPL-CCNC: 166 U/L (ref 15–37)
BASOPHILS # BLD AUTO: 0.02 X10(3) UL (ref 0–0.2)
BASOPHILS NFR BLD AUTO: 0.3 %
BILIRUB DIRECT SERPL-MCNC: 0.8 MG/DL (ref 0–0.2)
BILIRUB SERPL-MCNC: 6.3 MG/DL (ref 0.1–2)
BUN BLD-MCNC: 17 MG/DL (ref 7–18)
BUN/CREAT SERPL: 10.1 (ref 10–20)
CALCIUM BLD-MCNC: 9.5 MG/DL (ref 8.5–10.1)
CHLORIDE SERPL-SCNC: 95 MMOL/L (ref 98–112)
CO2 SERPL-SCNC: 28 MMOL/L (ref 21–32)
CREAT BLD-MCNC: 1.68 MG/DL (ref 0.7–1.3)
D DIMER PPP FEU-MCNC: 0.75 UG/ML FEU (ref ?–0.5)
DEPRECATED RDW RBC AUTO: 44.4 FL (ref 35.1–46.3)
EOSINOPHIL # BLD AUTO: 0.01 X10(3) UL (ref 0–0.7)
EOSINOPHIL NFR BLD AUTO: 0.2 %
ERYTHROCYTE [DISTWIDTH] IN BLOOD BY AUTOMATED COUNT: 12.2 % (ref 11–15)
GLUCOSE BLD-MCNC: 117 MG/DL (ref 70–99)
HAV IGM SER QL: 1.6 MG/DL (ref 1.6–2.6)
HCT VFR BLD AUTO: 39.8 % (ref 39–53)
HGB BLD-MCNC: 13.7 G/DL (ref 13–17.5)
IMM GRANULOCYTES # BLD AUTO: 0.02 X10(3) UL (ref 0–1)
IMM GRANULOCYTES NFR BLD: 0.3 %
LIPASE SERPL-CCNC: 291 U/L (ref 73–393)
LYMPHOCYTES # BLD AUTO: 0.83 X10(3) UL (ref 1–4)
LYMPHOCYTES NFR BLD AUTO: 12.7 %
M PROTEIN MFR SERPL ELPH: 8 G/DL (ref 6.4–8.2)
MCH RBC QN AUTO: 34 PG (ref 26–34)
MCHC RBC AUTO-ENTMCNC: 34.4 G/DL (ref 31–37)
MCV RBC AUTO: 98.8 FL (ref 80–100)
MONOCYTES # BLD AUTO: 0.48 X10(3) UL (ref 0.1–1)
MONOCYTES NFR BLD AUTO: 7.3 %
NEUTROPHILS # BLD AUTO: 5.2 X10 (3) UL (ref 1.5–7.7)
NEUTROPHILS # BLD AUTO: 5.2 X10(3) UL (ref 1.5–7.7)
NEUTROPHILS NFR BLD AUTO: 79.2 %
OSMOLALITY SERPL CALC.SUM OF ELEC: 277 MOSM/KG (ref 275–295)
PLATELET # BLD AUTO: 143 10(3)UL (ref 150–450)
POTASSIUM SERPL-SCNC: 3.6 MMOL/L (ref 3.5–5.1)
RBC # BLD AUTO: 4.03 X10(6)UL (ref 4.3–5.7)
SODIUM SERPL-SCNC: 132 MMOL/L (ref 136–145)
TROPONIN I SERPL-MCNC: <0.045 NG/ML (ref ?–0.04)
WBC # BLD AUTO: 6.6 X10(3) UL (ref 4–11)

## 2020-01-21 PROCEDURE — 83690 ASSAY OF LIPASE: CPT | Performed by: EMERGENCY MEDICINE

## 2020-01-21 PROCEDURE — S0028 INJECTION, FAMOTIDINE, 20 MG: HCPCS | Performed by: EMERGENCY MEDICINE

## 2020-01-21 PROCEDURE — 71260 CT THORAX DX C+: CPT | Performed by: EMERGENCY MEDICINE

## 2020-01-21 PROCEDURE — 85379 FIBRIN DEGRADATION QUANT: CPT | Performed by: EMERGENCY MEDICINE

## 2020-01-21 PROCEDURE — 80076 HEPATIC FUNCTION PANEL: CPT | Performed by: EMERGENCY MEDICINE

## 2020-01-21 PROCEDURE — 93010 ELECTROCARDIOGRAM REPORT: CPT | Performed by: EMERGENCY MEDICINE

## 2020-01-21 PROCEDURE — 93005 ELECTROCARDIOGRAM TRACING: CPT

## 2020-01-21 PROCEDURE — 83735 ASSAY OF MAGNESIUM: CPT | Performed by: EMERGENCY MEDICINE

## 2020-01-21 PROCEDURE — 80048 BASIC METABOLIC PNL TOTAL CA: CPT | Performed by: EMERGENCY MEDICINE

## 2020-01-21 PROCEDURE — 96374 THER/PROPH/DIAG INJ IV PUSH: CPT

## 2020-01-21 PROCEDURE — 85025 COMPLETE CBC W/AUTO DIFF WBC: CPT | Performed by: EMERGENCY MEDICINE

## 2020-01-21 PROCEDURE — 96361 HYDRATE IV INFUSION ADD-ON: CPT

## 2020-01-21 PROCEDURE — 84484 ASSAY OF TROPONIN QUANT: CPT | Performed by: EMERGENCY MEDICINE

## 2020-01-21 PROCEDURE — 99285 EMERGENCY DEPT VISIT HI MDM: CPT

## 2020-01-21 PROCEDURE — 71046 X-RAY EXAM CHEST 2 VIEWS: CPT | Performed by: EMERGENCY MEDICINE

## 2020-01-21 RX ORDER — FAMOTIDINE 10 MG/ML
20 INJECTION, SOLUTION INTRAVENOUS ONCE
Status: COMPLETED | OUTPATIENT
Start: 2020-01-21 | End: 2020-01-21

## 2020-01-21 NOTE — ED PROVIDER NOTES
Patient Seen in: [facility] AND [facility] Emergency Department      History   Patient presents with:  Chest Pain Angina    Stated Complaint: chest pain    HPI    Pt is 47 yo M who p/w generalized chest pressure x 3 days.  Describes as burning and worse with swal above.    Physical Exam     ED Triage Vitals [01/21/20 1237]   BP (!) 119/98   Pulse (!) 138   Resp 18   Temp 98 °F (36.7 °C)   Temp src    SpO2 98 %   O2 Device        Current:BP (!) 124/99   Pulse 101   Temp 98 °F (36.7 °C)   Resp 15   Ht 182.9 cm (6') ------                     CBC W/ DIFFERENTIAL[[id number]]          Abnormal            Final result                 Please view results for these tests on the individual orders.    RAINBOW DRAW BLUE   RAINBOW DRAW LAVENDER   RAINBOW DRAW LIGHT GREEN   RAINB his PCP.               Disposition and Plan     Clinical Impression:  Chest pain, atypical  (primary encounter diagnosis)    Disposition:  Discharge  1/21/2020  3:06 pm    Follow-up:  Sabas Aly MD  [address] [facility] [place] [place], [address] [place] [address] Drive

## 2020-01-27 ENCOUNTER — NURSE ONLY (OUTPATIENT)
Dept: ALLERGY | Facility: CLINIC | Age: 40
End: 2020-01-27
Payer: COMMERCIAL

## 2020-01-27 DIAGNOSIS — J30.89 ENVIRONMENTAL AND SEASONAL ALLERGIES: ICD-10-CM

## 2020-01-27 PROCEDURE — 95165 ANTIGEN THERAPY SERVICES: CPT | Performed by: ALLERGY & IMMUNOLOGY

## 2020-01-27 PROCEDURE — 95117 IMMUNOTHERAPY INJECTIONS: CPT | Performed by: ALLERGY & IMMUNOLOGY

## 2020-02-24 ENCOUNTER — NURSE ONLY (OUTPATIENT)
Dept: ALLERGY | Facility: CLINIC | Age: 40
End: 2020-02-24
Payer: COMMERCIAL

## 2020-02-24 DIAGNOSIS — J30.89 ENVIRONMENTAL AND SEASONAL ALLERGIES: ICD-10-CM

## 2020-02-24 PROCEDURE — 95117 IMMUNOTHERAPY INJECTIONS: CPT | Performed by: ALLERGY & IMMUNOLOGY

## 2020-03-16 ENCOUNTER — NURSE ONLY (OUTPATIENT)
Dept: ALLERGY | Facility: CLINIC | Age: 40
End: 2020-03-16
Payer: COMMERCIAL

## 2020-03-16 DIAGNOSIS — J30.89 ENVIRONMENTAL AND SEASONAL ALLERGIES: ICD-10-CM

## 2020-03-16 PROCEDURE — 95117 IMMUNOTHERAPY INJECTIONS: CPT | Performed by: ALLERGY & IMMUNOLOGY

## 2020-03-25 ENCOUNTER — TELEPHONE (OUTPATIENT)
Dept: ALLERGY | Facility: CLINIC | Age: 40
End: 2020-03-25

## 2020-03-25 NOTE — TELEPHONE ENCOUNTER
Spoke with patient, reviewed changes due to COVID-19 as follows:     -Allergy shot hours-now ending at 5:30pm on Mondays and Tuesdays, other days and hours remain the same due to COVID-19.    -Please call to schedule appt for shots, we are scheduling every

## 2020-03-31 ENCOUNTER — TELEPHONE (OUTPATIENT)
Dept: GASTROENTEROLOGY | Facility: CLINIC | Age: 40
End: 2020-03-31

## 2020-03-31 RX ORDER — AMITRIPTYLINE HYDROCHLORIDE 10 MG/1
10 TABLET, FILM COATED ORAL NIGHTLY
Qty: 90 TABLET | Refills: 1 | Status: SHIPPED | OUTPATIENT
Start: 2020-03-31 | End: 2020-04-21

## 2020-03-31 RX ORDER — OMEPRAZOLE 40 MG/1
40 CAPSULE, DELAYED RELEASE ORAL DAILY
Qty: 90 CAPSULE | Refills: 3 | Status: SHIPPED | OUTPATIENT
Start: 2020-03-31 | End: 2020-06-29

## 2020-04-08 ENCOUNTER — TELEPHONE (OUTPATIENT)
Dept: GASTROENTEROLOGY | Facility: CLINIC | Age: 40
End: 2020-04-08

## 2020-04-08 RX ORDER — METOCLOPRAMIDE 10 MG/1
10 TABLET ORAL
Qty: 90 TABLET | Refills: 0 | Status: ON HOLD | OUTPATIENT
Start: 2020-04-08 | End: 2020-07-30

## 2020-04-08 NOTE — TELEPHONE ENCOUNTER
Thanks [employer]. No easy answers here. Please call Gina Marx to advise:    Reglan/metoclopramide has worked for him in the past.  I can certainly send in more of that if he would like.   If the vomiting is completely uncontrollable, I could otherwise prescr

## 2020-04-08 NOTE — TELEPHONE ENCOUNTER
Dr Marshall Lopez, please see your 3/31 phone encounter. Patient/spouse transferred from phone room. He is now vomiting daily--today2-3x. Denies pain, but states he cannot keep any solids down.  Is drinking two of the 24 ounce bottles of water daily, occasional spr

## 2020-04-08 NOTE — TELEPHONE ENCOUNTER
Sent call to CHRISTOPHER Davila [facility] patient has been sick the last 3 weeks and have been vomiting, has a sore throat. Patient has lost weight. \[facility] [place] [facility]\"" patient is not experiencing shortness of breath, coughing, or fevers.   \[facility] [place] [facility]\"" patient has not been exposed

## 2020-04-13 ENCOUNTER — NURSE ONLY (OUTPATIENT)
Dept: ALLERGY | Facility: CLINIC | Age: 40
End: 2020-04-13
Payer: COMMERCIAL

## 2020-04-13 DIAGNOSIS — J30.89 ENVIRONMENTAL AND SEASONAL ALLERGIES: ICD-10-CM

## 2020-04-13 PROCEDURE — 95117 IMMUNOTHERAPY INJECTIONS: CPT | Performed by: ALLERGY & IMMUNOLOGY

## 2020-04-14 ENCOUNTER — HOSPITAL ENCOUNTER (EMERGENCY)
Facility: HOSPITAL | Age: 40
Discharge: HOME | End: 2020-04-14
Attending: EMERGENCY MEDICINE
Payer: COMMERCIAL

## 2020-04-14 ENCOUNTER — APPOINTMENT (OUTPATIENT)
Dept: GENERAL RADIOLOGY | Facility: HOSPITAL | Age: 40
End: 2020-04-14
Attending: EMERGENCY MEDICINE
Payer: COMMERCIAL

## 2020-04-14 VITALS
RESPIRATION RATE: 18 BRPM | WEIGHT: 135 LBS | DIASTOLIC BLOOD PRESSURE: 69 MMHG | HEART RATE: 99 BPM | BODY MASS INDEX: 18.9 KG/M2 | HEIGHT: 71 IN | OXYGEN SATURATION: 99 % | SYSTOLIC BLOOD PRESSURE: 112 MMHG | TEMPERATURE: 98 F

## 2020-04-14 DIAGNOSIS — T07.XXXA ABRASIONS OF MULTIPLE SITES: ICD-10-CM

## 2020-04-14 DIAGNOSIS — S01.111A LACERATION OF RIGHT EYEBROW, INITIAL ENCOUNTER: Primary | ICD-10-CM

## 2020-04-14 DIAGNOSIS — S46.911A RIGHT SHOULDER STRAIN, INITIAL ENCOUNTER: ICD-10-CM

## 2020-04-14 PROCEDURE — 12011 RPR F/E/E/N/L/M 2.5 CM/<: CPT

## 2020-04-14 PROCEDURE — 73030 X-RAY EXAM OF SHOULDER: CPT | Performed by: EMERGENCY MEDICINE

## 2020-04-14 PROCEDURE — 99283 EMERGENCY DEPT VISIT LOW MDM: CPT

## 2020-04-15 NOTE — ED NOTES
Pt was attempting to walk dog when the dog pulled him to the ground. Lac above right eyebrow. Bleeding controlled. Abrasion and pain to right shoulder. Cms intact. Denies LOC.

## 2020-04-15 NOTE — ED INITIAL ASSESSMENT (HPI)
Pt reports he was about to take his dog out for a walk. As soon as he opened the door his dog saw another dog and went after it causing pt's dog to pull pt down onto the concrete. Pt has lac meredith right eye and right shoulder pain. Denies LOC.  Not on blood

## 2020-04-16 NOTE — ED PROVIDER NOTES
Patient Seen in: [facility] AND [facility] Emergency Department      History   Patient presents with:  Fall  Laceration Abrasion    Stated Complaint: fall    HPI    46year old male who fell and sustained laceration to R eyebrow and abrasions to hands just pta, Current:/69   Pulse 99   Temp 97.9 °F (36.6 °C) (Oral)   Resp 18   Ht 180.3 cm (5' 11\")   Wt 61.2 kg   SpO2 99%   BMI 18.83 kg/m²         Physical Exam  Vitals signs and nursing note reviewed.    Constitutional:       General: He is not in acute dist Verbal consent was obtained from the patient. The 2 cm laceration located R eyebrow was anesthetized in the usual fashion. The wound was scrubbed, draped and explored. There were no deep structures involved. No tendon injury was identified.   The wound

## 2020-04-17 ENCOUNTER — APPOINTMENT (OUTPATIENT)
Dept: CT IMAGING | Age: 40
End: 2020-04-17
Attending: NURSE PRACTITIONER
Payer: COMMERCIAL

## 2020-04-17 ENCOUNTER — HOSPITAL ENCOUNTER (OUTPATIENT)
Age: 40
Discharge: HOME | End: 2020-04-17
Payer: COMMERCIAL

## 2020-04-17 VITALS
SYSTOLIC BLOOD PRESSURE: 124 MMHG | DIASTOLIC BLOOD PRESSURE: 94 MMHG | WEIGHT: 140 LBS | RESPIRATION RATE: 17 BRPM | TEMPERATURE: 98 F | OXYGEN SATURATION: 100 % | HEIGHT: 72 IN | BODY MASS INDEX: 18.96 KG/M2 | HEART RATE: 93 BPM

## 2020-04-17 DIAGNOSIS — S01.81XA CHIN LACERATION, INITIAL ENCOUNTER: Primary | ICD-10-CM

## 2020-04-17 PROCEDURE — 70450 CT HEAD/BRAIN W/O DYE: CPT | Performed by: NURSE PRACTITIONER

## 2020-04-17 PROCEDURE — 99214 OFFICE O/P EST MOD 30 MIN: CPT

## 2020-04-17 PROCEDURE — 12011 RPR F/E/E/N/L/M 2.5 CM/<: CPT

## 2020-04-17 NOTE — ED INITIAL ASSESSMENT (HPI)
PATIENT WITH CHIN LACERATION, STATES HE FELL LAST NIGHT ONTO A SHELF. DENIES LOSS OF CONSCIOUSNESS. LAST TDAP 2019.

## 2020-04-17 NOTE — ED PROVIDER NOTES
Patient presents with:  Laceration Abrasion      HPI:     Marylee Collie is a 46year old male with a history of alcohol abuse presents with a chief complaint fall. States last night around 12 AM he fell onto a shelf.   States he was intoxicated at the isha Orders for this encounter:  SPO2 100% on room air which is normal.    Wound care, CT head, wound closure and reevaluate    CT head, no evidence intracranial hemorrhage. I discussed these findings with the patient.   He is well-appearing exam, nontoxic ap

## 2020-04-21 PROCEDURE — 85610 PROTHROMBIN TIME: CPT | Performed by: INTERNAL MEDICINE

## 2020-04-21 PROCEDURE — 80050 GENERAL HEALTH PANEL: CPT | Performed by: INTERNAL MEDICINE

## 2020-04-21 PROCEDURE — 82306 VITAMIN D 25 HYDROXY: CPT | Performed by: INTERNAL MEDICINE

## 2020-04-21 PROCEDURE — 83036 HEMOGLOBIN GLYCOSYLATED A1C: CPT | Performed by: INTERNAL MEDICINE

## 2020-04-21 PROCEDURE — 83540 ASSAY OF IRON: CPT | Performed by: INTERNAL MEDICINE

## 2020-04-21 PROCEDURE — 84466 ASSAY OF TRANSFERRIN: CPT | Performed by: INTERNAL MEDICINE

## 2020-04-21 PROCEDURE — 82728 ASSAY OF FERRITIN: CPT | Performed by: INTERNAL MEDICINE

## 2020-04-21 NOTE — PROGRESS NOTES
Ronald Way is a 46year old male. Patient presents with:  ER F/U: pt in for ED follow up   Suture Removal      HPI:         1 week ago fell onto right side with outstretched right shoulder .   He suffered a laceration above his right eyebrow for whic Rectal Suppos Place 1 suppository (25 mg total) rectally every 12 (twelve) hours as needed for Nausea. 10 suppository 0   • ondansetron 4 MG Oral Tablet Dispersible Take 1 tablet (4 mg total) by mouth every 8 (eight) hours as needed for Nausea.  30 tablet 1 legs, left greater than right  NEUROLOGICAL: denies frequent headaches  HEME: Complains of bruising  PSYCH: Does not appear upset when wife states that she is upset that he is unable to have her children at her home because of his banking    Physical Exam: >=60    GFR, -American 128 >=60     (H) 16 - 61 U/L     (H) 15 - 37 U/L    Alkaline Phosphatase 86 45 - 117 U/L    Bilirubin, Total 2.0 0.1 - 2.0 mg/dL    Total Protein 6.8 6.4 - 8.2 g/dL    Albumin 0.0 (L) 3.4 - 5.0 g/dL    Globulin fall.  TECHNIQUE: 3 views were obtained. FINDINGS:  BONES: No acute fracture or dislocation is apparent. The acromioclavicular and glenohumeral joints are congruent. There is minimal acromioclavicular degeneration.  No suspicious osseous lesions are detec evidence of intracranial hemorrhage.     Dictated by (CST): Jacob Crabtree MD on 4/17/2020 at 10:30 AM     Finalized by (CST): Jacob Crabtree MD on 4/17/2020 at 10:34 AM            Assessment/Plan:    (F10.20) Alcoholism ([facility] [address]  (primary en Requested Prescriptions      No prescriptions requested or ordered in this encounter       55 minutes spent with patient and wife, greater than 50% in counseling and discussion.   Follow-up after labs      Dalia Hawk MD

## 2020-04-23 ENCOUNTER — TELEPHONE (OUTPATIENT)
Dept: INTERNAL MEDICINE CLINIC | Facility: CLINIC | Age: 40
End: 2020-04-23

## 2020-04-23 DIAGNOSIS — D75.89 MACROCYTOSIS: Primary | ICD-10-CM

## 2020-05-03 ENCOUNTER — TELEPHONE (OUTPATIENT)
Dept: INTERNAL MEDICINE CLINIC | Facility: CLINIC | Age: 40
End: 2020-05-03

## 2020-05-04 NOTE — TELEPHONE ENCOUNTER
Called patient to discuss lab results, which he and his wife have already seen  Advised of elevated liver enzymes indicating alcohol damage to liver; extremely low albumin, abnormal iron studies.   Again advised patient that he needs to stop alcohol complet

## 2020-05-11 ENCOUNTER — NURSE ONLY (OUTPATIENT)
Dept: ALLERGY | Facility: CLINIC | Age: 40
End: 2020-05-11
Payer: COMMERCIAL

## 2020-05-11 DIAGNOSIS — J30.89 ENVIRONMENTAL AND SEASONAL ALLERGIES: ICD-10-CM

## 2020-05-11 PROCEDURE — 95117 IMMUNOTHERAPY INJECTIONS: CPT | Performed by: ALLERGY & IMMUNOLOGY

## 2020-05-14 ENCOUNTER — HOSPITAL ENCOUNTER (EMERGENCY)
Facility: HOSPITAL | Age: 40
Discharge: HOME | End: 2020-05-15
Attending: EMERGENCY MEDICINE
Payer: COMMERCIAL

## 2020-05-14 ENCOUNTER — APPOINTMENT (OUTPATIENT)
Dept: GENERAL RADIOLOGY | Facility: HOSPITAL | Age: 40
End: 2020-05-14
Attending: EMERGENCY MEDICINE
Payer: COMMERCIAL

## 2020-05-14 ENCOUNTER — APPOINTMENT (OUTPATIENT)
Dept: CT IMAGING | Facility: HOSPITAL | Age: 40
End: 2020-05-14
Attending: EMERGENCY MEDICINE
Payer: COMMERCIAL

## 2020-05-14 VITALS
RESPIRATION RATE: 18 BRPM | OXYGEN SATURATION: 96 % | DIASTOLIC BLOOD PRESSURE: 102 MMHG | HEIGHT: 71 IN | TEMPERATURE: 99 F | HEART RATE: 88 BPM | SYSTOLIC BLOOD PRESSURE: 140 MMHG | BODY MASS INDEX: 20.3 KG/M2 | WEIGHT: 145 LBS
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
## 2020-05-14 DIAGNOSIS — S22.42XA CLOSED FRACTURE OF MULTIPLE RIBS OF LEFT SIDE, INITIAL ENCOUNTER: ICD-10-CM

## 2020-05-14 DIAGNOSIS — S42.034A CLOSED NONDISPLACED FRACTURE OF ACROMIAL END OF RIGHT CLAVICLE, INITIAL ENCOUNTER: ICD-10-CM

## 2020-05-14 PROCEDURE — 85025 COMPLETE CBC W/AUTO DIFF WBC: CPT | Performed by: EMERGENCY MEDICINE

## 2020-05-14 PROCEDURE — 80048 BASIC METABOLIC PNL TOTAL CA: CPT | Performed by: EMERGENCY MEDICINE

## 2020-05-14 PROCEDURE — 71111 X-RAY EXAM RIBS/CHEST4/> VWS: CPT | Performed by: EMERGENCY MEDICINE

## 2020-05-14 PROCEDURE — 99284 EMERGENCY DEPT VISIT MOD MDM: CPT

## 2020-05-14 PROCEDURE — 73030 X-RAY EXAM OF SHOULDER: CPT | Performed by: EMERGENCY MEDICINE

## 2020-05-14 PROCEDURE — 74177 CT ABD & PELVIS W/CONTRAST: CPT | Performed by: EMERGENCY MEDICINE

## 2020-05-14 PROCEDURE — 36415 COLL VENOUS BLD VENIPUNCTURE: CPT

## 2020-05-14 RX ORDER — LIDOCAINE 50 MG/G
1 PATCH TOPICAL EVERY 24 HOURS
Qty: 7 PATCH | Refills: 0 | Status: SHIPPED | OUTPATIENT
Start: 2020-05-14 | End: 2020-05-21

## 2020-05-14 RX ORDER — ACETAMINOPHEN AND CODEINE PHOSPHATE 300; 30 MG/1; MG/1
1 TABLET ORAL EVERY 6 HOURS PRN
Qty: 20 TABLET | Refills: 0 | Status: SHIPPED | OUTPATIENT
Start: 2020-05-14 | End: 2020-05-19

## 2020-05-14 RX ORDER — ORPHENADRINE CITRATE 100 MG/1
100 TABLET, EXTENDED RELEASE ORAL 2 TIMES DAILY
Qty: 20 TABLET | Refills: 0 | Status: SHIPPED | OUTPATIENT
Start: 2020-05-14 | End: 2020-05-24

## 2020-05-15 NOTE — ED PROVIDER NOTES
Patient Seen in: [facility] Emergency Department    History   Patient presents with:  Fall    Stated Complaint: fall     HPI    28-year-old male with past medical history of hemochromatosis, cyclical vomiting, osteoarthritis, hyperbilirubinemia pres Metoclopramide HCl 10 MG Oral Tab,  Take 1-2 tablets (10-20 mg total) by mouth every 6 (six) hours as needed. Coenzyme Q10 (COQ-10) 200 MG Oral Cap,  Take by mouth 2 (two) times daily.      prochlorperazine (COMPAZINE) 25 MG Rectal Suppos,  Place 1 sup Constitutional: No distress. HEENT: MMM. Head: Normocephalic. Atraumatic. Eyes: No injection. Pupils midrange and equally reactive. No proptosis, no hyphema. Cardiovascular: Tachycardic. BUE with 2+ radial pulses.   Pulmonary/Chest: Effort normal. CT SHOULDER, COMPLETE (MIN 2 VIEWS), RIGHT (CPT=73030)  COMPARISON: [facility], XR SHOULDER, COMPLETE (MIN 2 VIEWS), RIGHT (CPT=73030), 4/14/2020, 9:21 PM.  INDICATIONS: Right shoulder pain post fall today. TECHNIQUE: 3 views were obtained. (CST): Ben Crabtree MD on 4/17/2020 at 10:30 AM     Finalized by (CST): Ben Crabtree MD on 4/17/2020 at 10:34 AM          Xr Ribs With Chest, Bilateral   (tnd=80337)    Result Date: 5/14/2020  PROCEDURE: XR RIBS WITH CHEST, BILATERAL unremarkable. PANCREAS: Diffuse atrophy. No focal enlargement. ADRENALS: No defined mass or abnormal enlargement. KIDNEYS: Normal.  No mass, obstruction or calcification. AORTA/VASCULAR:   Normal.  No aneurysm or dissection.   RETROPERITONEUM: No mass or clavicular/rib fractures for which sling placed and IS initiated. Stable for discharge home with symptomatic care and close/ongoing PCP followup.     I was wearing at minimum a facemask and eye protection throughout this encounter with handwashing performed

## 2020-05-15 NOTE — ED INITIAL ASSESSMENT (HPI)
Patient was walking dog, dog pulled him and he fell. Patient has hx of fractured R collarbone. Patient admits to ETOH. Patient had fallen onto his back and was having difficulty getting back up because of his shoulder. Brought in by EMS.

## 2020-06-01 DIAGNOSIS — J30.89 ENVIRONMENTAL AND SEASONAL ALLERGIES: ICD-10-CM

## 2020-06-01 RX ORDER — MONTELUKAST SODIUM 10 MG/1
10 TABLET ORAL NIGHTLY
Qty: 90 TABLET | Refills: 0 | Status: SHIPPED | OUTPATIENT
Start: 2020-06-01 | End: 2020-12-14

## 2020-06-01 RX ORDER — LEVOCETIRIZINE DIHYDROCHLORIDE 5 MG/1
5 TABLET, FILM COATED ORAL NIGHTLY
Qty: 90 TABLET | Refills: 0 | Status: SHIPPED | OUTPATIENT
Start: 2020-06-01 | End: 2020-12-21

## 2020-06-01 NOTE — TELEPHONE ENCOUNTER
Received refill request for Xyzal 5 mg- 1 tablet by mouth daily. Singulair 10 mg -1 tablet by mouth daily. LOV 11-19-19 for allergies, follow up in 1 year. Refills pended and forwarded to Dr. Elise Carr for further directions.

## 2020-06-03 ENCOUNTER — TELEPHONE (OUTPATIENT)
Dept: ALLERGY | Facility: CLINIC | Age: 40
End: 2020-06-03

## 2020-06-03 NOTE — TELEPHONE ENCOUNTER
Prior authorization needed for singulair. PA completed via [employer]    OMER PHAN (Katz: [id number])    Your information has been submitted to [employer]. [employer] is reviewing the PA request and you will receive an electronic response.  You may

## 2020-06-03 NOTE — TELEPHONE ENCOUNTER
This request has received a Cancelled outcome. This may mean either your patient does not have active coverage with this plan, this authorization was processed as a duplicate request, or an authorization was not needed for this medication.   Note any addit

## 2020-06-15 ENCOUNTER — NURSE ONLY (OUTPATIENT)
Dept: ALLERGY | Facility: CLINIC | Age: 40
End: 2020-06-15
Payer: COMMERCIAL

## 2020-06-15 DIAGNOSIS — J30.89 ENVIRONMENTAL AND SEASONAL ALLERGIES: ICD-10-CM

## 2020-06-15 PROCEDURE — 95165 ANTIGEN THERAPY SERVICES: CPT | Performed by: ALLERGY & IMMUNOLOGY

## 2020-06-15 PROCEDURE — 95117 IMMUNOTHERAPY INJECTIONS: CPT | Performed by: ALLERGY & IMMUNOLOGY

## 2020-06-17 ENCOUNTER — TELEPHONE (OUTPATIENT)
Dept: INTERNAL MEDICINE CLINIC | Facility: CLINIC | Age: 40
End: 2020-06-17

## 2020-06-17 NOTE — TELEPHONE ENCOUNTER
Spoke to patient. Reports \"SOB are on and off\" -- subjectively, per phone call patient is speaking in full sentences, no increased work of breathing or labored breathing noted, and is AxO. No chest pain or fevers.   Dizziness, reports he has to sit down

## 2020-06-17 NOTE — TELEPHONE ENCOUNTER
Pts wife called requesting to speak to Dr. Elizabeth Gould or a nurse. She is very concerned about the patient due to recent symptoms. Pt is experiencing shortness of breath, light headedness, dizziness, lack of appetite, vomiting, fatigue, and trouble sleeping.

## 2020-06-18 ENCOUNTER — VIRTUAL PHONE E/M (OUTPATIENT)
Dept: INTERNAL MEDICINE CLINIC | Facility: CLINIC | Age: 40
End: 2020-06-18
Payer: COMMERCIAL

## 2020-06-18 DIAGNOSIS — K21.9 GASTROESOPHAGEAL REFLUX DISEASE, ESOPHAGITIS PRESENCE NOT SPECIFIED: ICD-10-CM

## 2020-06-18 DIAGNOSIS — F10.20 ALCOHOLISM (HCC): ICD-10-CM

## 2020-06-18 DIAGNOSIS — I95.1 ORTHOSTATIC HYPOTENSION: ICD-10-CM

## 2020-06-18 DIAGNOSIS — Z86.39: ICD-10-CM

## 2020-06-18 DIAGNOSIS — R26.9 GAIT DISORDER: ICD-10-CM

## 2020-06-18 DIAGNOSIS — F10.10 CHRONIC ALCOHOL ABUSE: ICD-10-CM

## 2020-06-18 DIAGNOSIS — E83.119 HEMOCHROMATOSIS, UNSPECIFIED HEMOCHROMATOSIS TYPE: ICD-10-CM

## 2020-06-18 DIAGNOSIS — I95.1 ORTHOSTASIS: Primary | ICD-10-CM

## 2020-06-18 DIAGNOSIS — D61.818 PANCYTOPENIA (HCC): ICD-10-CM

## 2020-06-18 PROCEDURE — 99213 OFFICE O/P EST LOW 20 MIN: CPT | Performed by: INTERNAL MEDICINE

## 2020-06-18 NOTE — PROGRESS NOTES
Virtual Telephone Check-In    Jessica Jay verbally consents to a Virtual/Telephone Check-In visit on 06/18/20. Patient has been referred to the [facility] website at [email or web address] to review the yearly Consent to Treat document.     Patient Karlie Lutz hypotension  Plan: Dizziness and pre-faint with getting up, likely orthostasis although may be other problems also. Advised to go to ER    (R26.9) Gait disorder  Plan: Gait imbalance likely due to chronic alcohol abuse.   Advised alcohol may cause cerebell

## 2020-07-18 ENCOUNTER — HOSPITAL ENCOUNTER (INPATIENT)
Facility: HOSPITAL | Age: 40
LOS: 12 days | Discharge: NURSING, REHAB OR LONG-TERM CARE | DRG: 433 | End: 2020-07-30
Attending: EMERGENCY MEDICINE | Admitting: EMERGENCY MEDICINE
Payer: COMMERCIAL

## 2020-07-18 ENCOUNTER — APPOINTMENT (OUTPATIENT)
Dept: CT IMAGING | Facility: HOSPITAL | Age: 40
DRG: 433 | End: 2020-07-18
Attending: EMERGENCY MEDICINE
Payer: COMMERCIAL

## 2020-07-18 DIAGNOSIS — K70.11 ASCITES DUE TO ALCOHOLIC HEPATITIS: Primary | ICD-10-CM

## 2020-07-18 LAB
ALBUMIN SERPL-MCNC: 2.5 G/DL (ref 3.4–5)
ALBUMIN/GLOB SERPL: 0.6 {RATIO} (ref 1–2)
ALP LIVER SERPL-CCNC: 292 U/L (ref 45–117)
ALT SERPL-CCNC: 105 U/L (ref 16–61)
AMMONIA PLAS-MCNC: <10 UMOL/L (ref 11–32)
ANION GAP SERPL CALC-SCNC: 14 MMOL/L (ref 0–18)
AST SERPL-CCNC: 550 U/L (ref 15–37)
BASOPHILS # BLD AUTO: 0.03 X10(3) UL (ref 0–0.2)
BASOPHILS NFR BLD AUTO: 1 %
BILIRUB SERPL-MCNC: 13.2 MG/DL (ref 0.1–2)
BUN BLD-MCNC: 8 MG/DL (ref 7–18)
BUN/CREAT SERPL: 9.3 (ref 10–20)
CALCIUM BLD-MCNC: 8.4 MG/DL (ref 8.5–10.1)
CHLORIDE SERPL-SCNC: 93 MMOL/L (ref 98–112)
CO2 SERPL-SCNC: 26 MMOL/L (ref 21–32)
CREAT BLD-MCNC: 0.86 MG/DL (ref 0.7–1.3)
DEPRECATED RDW RBC AUTO: 62.8 FL (ref 35.1–46.3)
EOSINOPHIL # BLD AUTO: 0 X10(3) UL (ref 0–0.7)
EOSINOPHIL NFR BLD AUTO: 0 %
ERYTHROCYTE [DISTWIDTH] IN BLOOD BY AUTOMATED COUNT: 17 % (ref 11–15)
ETHANOL SERPL-MCNC: 88 MG/DL (ref ?–3)
GLOBULIN PLAS-MCNC: 4.3 G/DL (ref 2.8–4.4)
GLUCOSE BLD-MCNC: 180 MG/DL (ref 70–99)
HCT VFR BLD AUTO: 30.7 % (ref 39–53)
HGB BLD-MCNC: 10.6 G/DL (ref 13–17.5)
IMM GRANULOCYTES # BLD AUTO: 0.01 X10(3) UL (ref 0–1)
IMM GRANULOCYTES NFR BLD: 0.3 %
INR BLD: 1.35 (ref 0.9–1.2)
LYMPHOCYTES # BLD AUTO: 0.71 X10(3) UL (ref 1–4)
LYMPHOCYTES NFR BLD AUTO: 22.6 %
M PROTEIN MFR SERPL ELPH: 6.8 G/DL (ref 6.4–8.2)
MCH RBC QN AUTO: 35.1 PG (ref 26–34)
MCHC RBC AUTO-ENTMCNC: 34.5 G/DL (ref 31–37)
MCV RBC AUTO: 101.7 FL (ref 80–100)
MONOCYTES # BLD AUTO: 0.25 X10(3) UL (ref 0.1–1)
MONOCYTES NFR BLD AUTO: 8 %
NEUTROPHILS # BLD AUTO: 2.14 X10 (3) UL (ref 1.5–7.7)
NEUTROPHILS # BLD AUTO: 2.14 X10(3) UL (ref 1.5–7.7)
NEUTROPHILS NFR BLD AUTO: 68.1 %
OSMOLALITY SERPL CALC.SUM OF ELEC: 279 MOSM/KG (ref 275–295)
PLATELET # BLD AUTO: 102 10(3)UL (ref 150–450)
POTASSIUM SERPL-SCNC: 3.3 MMOL/L (ref 3.5–5.1)
PROTHROMBIN TIME: 16.4 SECONDS (ref 11.8–14.5)
RBC # BLD AUTO: 3.02 X10(6)UL (ref 4.3–5.7)
SARS-COV-2 RNA RESP QL NAA+PROBE: NOT DETECTED
SODIUM SERPL-SCNC: 133 MMOL/L (ref 136–145)
WBC # BLD AUTO: 3.1 X10(3) UL (ref 4–11)

## 2020-07-18 PROCEDURE — 74176 CT ABD & PELVIS W/O CONTRAST: CPT | Performed by: EMERGENCY MEDICINE

## 2020-07-18 PROCEDURE — 99223 1ST HOSP IP/OBS HIGH 75: CPT | Performed by: HOSPITALIST

## 2020-07-18 RX ORDER — OMEPRAZOLE 40 MG/1
40 CAPSULE, DELAYED RELEASE ORAL DAILY
COMMUNITY
End: 2021-03-02

## 2020-07-18 RX ORDER — METOCLOPRAMIDE HYDROCHLORIDE 5 MG/ML
10 INJECTION INTRAMUSCULAR; INTRAVENOUS EVERY 8 HOURS PRN
Status: DISCONTINUED | OUTPATIENT
Start: 2020-07-18 | End: 2020-07-30

## 2020-07-18 RX ORDER — DOXEPIN HYDROCHLORIDE 50 MG/1
1 CAPSULE ORAL DAILY
Status: DISCONTINUED | OUTPATIENT
Start: 2020-07-18 | End: 2020-07-30

## 2020-07-18 RX ORDER — SODIUM CHLORIDE 0.9 % (FLUSH) 0.9 %
3 SYRINGE (ML) INJECTION AS NEEDED
Status: DISCONTINUED | OUTPATIENT
Start: 2020-07-18 | End: 2020-07-30

## 2020-07-18 RX ORDER — PANTOPRAZOLE SODIUM 40 MG/1
40 TABLET, DELAYED RELEASE ORAL
Status: DISCONTINUED | OUTPATIENT
Start: 2020-07-19 | End: 2020-07-30

## 2020-07-18 RX ORDER — AMITRIPTYLINE HYDROCHLORIDE 10 MG/1
10 TABLET, FILM COATED ORAL NIGHTLY
Status: DISCONTINUED | OUTPATIENT
Start: 2020-07-18 | End: 2020-07-20

## 2020-07-18 RX ORDER — MONTELUKAST SODIUM 10 MG/1
10 TABLET ORAL NIGHTLY
Status: DISCONTINUED | OUTPATIENT
Start: 2020-07-18 | End: 2020-07-30

## 2020-07-18 RX ORDER — POTASSIUM CHLORIDE 20 MEQ/1
40 TABLET, EXTENDED RELEASE ORAL EVERY 4 HOURS
Status: COMPLETED | OUTPATIENT
Start: 2020-07-18 | End: 2020-07-18

## 2020-07-18 RX ORDER — ONDANSETRON 2 MG/ML
4 INJECTION INTRAMUSCULAR; INTRAVENOUS EVERY 6 HOURS PRN
Status: DISCONTINUED | OUTPATIENT
Start: 2020-07-18 | End: 2020-07-30

## 2020-07-18 RX ORDER — SODIUM CHLORIDE 9 MG/ML
INJECTION, SOLUTION INTRAVENOUS CONTINUOUS
Status: DISCONTINUED | OUTPATIENT
Start: 2020-07-18 | End: 2020-07-27

## 2020-07-18 NOTE — ED PROVIDER NOTES
Patient Seen in: [facility] AND [facility] Emergency Department      History   Patient presents with:  Jaundice    Stated Complaint: jaundice    HPI    Patient is a 29-year-old male he states he is an alcoholic and he has cyclic vomiting syndrome.   He states he 1635 117   Resp 07/18/20 1635 18   Temp 07/18/20 1635 98 °F (36.7 °C)   Temp src 07/18/20 1959 Oral   SpO2 07/18/20 1635 98 %   O2 Device 07/18/20 1815 None (Room air)       Current:/78 (BP Location: Right arm)   Pulse 92   Temp 99.1 °F (37.3 °C) (Or limits   PROTHROMBIN TIME (PT) - Abnormal; Notable for the following components:    PT 16.4 (*)     INR 1.35 (*)     All other components within normal limits   AMMONIA, PLASMA - Abnormal; Notable for the following components:    Ammonia <10 (*)     All ot PLATELET.   Procedure                               Abnormality         Status                     ---------                               -----------         ------                     CBC W/ DIFFERENTIAL[[id number]]          Abnormal            Final resul to the history of hemochromatosis/renal iron deposition.     Dictated by (CST): Aracelis Brooke MD on 7/18/2020 at 6:10 PM     Finalized by (CST): Aracelis Brooke MD on 7/18/2020 at 6:23 PM                  MDM     Admission disposition: 7/18/2020  6:

## 2020-07-18 NOTE — ED INITIAL ASSESSMENT (HPI)
Patient complains of vomiting at least once a week, fatigued \"for years\", states he is an alcoholic and has been drinking 4-5 drinks a night \"for years\", patient presents as jaundiced, patient is not sure how long he has been jaundiced

## 2020-07-18 NOTE — CM/SW NOTE
The pt's wife called and was seeking assistance in trying to get her  to come to the ED. She stated he has been having n/v, and his skin is yellow. I spoke to the pt and his wife and finally agreed to come to the ED for an evaluation.  They are comin

## 2020-07-19 ENCOUNTER — APPOINTMENT (OUTPATIENT)
Dept: ULTRASOUND IMAGING | Facility: HOSPITAL | Age: 40
DRG: 433 | End: 2020-07-19
Attending: HOSPITALIST
Payer: COMMERCIAL

## 2020-07-19 LAB
ALBUMIN SERPL-MCNC: 2.1 G/DL (ref 3.4–5)
ALBUMIN/GLOB SERPL: 0.6 {RATIO} (ref 1–2)
ALP LIVER SERPL-CCNC: 242 U/L (ref 45–117)
ALT SERPL-CCNC: 82 U/L (ref 16–61)
ANION GAP SERPL CALC-SCNC: 9 MMOL/L (ref 0–18)
AST SERPL-CCNC: 432 U/L (ref 15–37)
BASOPHILS # BLD AUTO: 0.01 X10(3) UL (ref 0–0.2)
BASOPHILS NFR BLD AUTO: 0.7 %
BILIRUB SERPL-MCNC: 13.6 MG/DL (ref 0.1–2)
BUN BLD-MCNC: 8 MG/DL (ref 7–18)
BUN/CREAT SERPL: 10.8 (ref 10–20)
C DIFF TOX B STL QL: POSITIVE
CALCIUM BLD-MCNC: 8.1 MG/DL (ref 8.5–10.1)
CHLORIDE SERPL-SCNC: 98 MMOL/L (ref 98–112)
CO2 SERPL-SCNC: 28 MMOL/L (ref 21–32)
CREAT BLD-MCNC: 0.74 MG/DL (ref 0.7–1.3)
DEPRECATED RDW RBC AUTO: 61.8 FL (ref 35.1–46.3)
EOSINOPHIL # BLD AUTO: 0 X10(3) UL (ref 0–0.7)
EOSINOPHIL NFR BLD AUTO: 0 %
ERYTHROCYTE [DISTWIDTH] IN BLOOD BY AUTOMATED COUNT: 17.1 % (ref 11–15)
GLOBULIN PLAS-MCNC: 3.6 G/DL (ref 2.8–4.4)
GLUCOSE BLD-MCNC: 95 MG/DL (ref 70–99)
HCT VFR BLD AUTO: 23.5 % (ref 39–53)
HGB BLD-MCNC: 8.2 G/DL (ref 13–17.5)
IMM GRANULOCYTES # BLD AUTO: 0 X10(3) UL (ref 0–1)
IMM GRANULOCYTES NFR BLD: 0 %
INR BLD: 1.29 (ref 0.9–1.2)
LYMPHOCYTES # BLD AUTO: 0.35 X10(3) UL (ref 1–4)
LYMPHOCYTES NFR BLD AUTO: 23.2 %
M PROTEIN MFR SERPL ELPH: 5.7 G/DL (ref 6.4–8.2)
MCH RBC QN AUTO: 34.7 PG (ref 26–34)
MCHC RBC AUTO-ENTMCNC: 34.9 G/DL (ref 31–37)
MCV RBC AUTO: 99.6 FL (ref 80–100)
MONOCYTES # BLD AUTO: 0.19 X10(3) UL (ref 0.1–1)
MONOCYTES NFR BLD AUTO: 12.6 %
NEUTROPHILS # BLD AUTO: 0.96 X10 (3) UL (ref 1.5–7.7)
NEUTROPHILS # BLD AUTO: 0.96 X10(3) UL (ref 1.5–7.7)
NEUTROPHILS NFR BLD AUTO: 63.5 %
OSMOLALITY SERPL CALC.SUM OF ELEC: 278 MOSM/KG (ref 275–295)
PLATELET # BLD AUTO: 55 10(3)UL (ref 150–450)
POTASSIUM SERPL-SCNC: 3.9 MMOL/L (ref 3.5–5.1)
POTASSIUM SERPL-SCNC: 3.9 MMOL/L (ref 3.5–5.1)
PROTHROMBIN TIME: 15.9 SECONDS (ref 11.8–14.5)
RBC # BLD AUTO: 2.36 X10(6)UL (ref 4.3–5.7)
SODIUM SERPL-SCNC: 135 MMOL/L (ref 136–145)
WBC # BLD AUTO: 1.5 X10(3) UL (ref 4–11)

## 2020-07-19 PROCEDURE — 99223 1ST HOSP IP/OBS HIGH 75: CPT | Performed by: INTERNAL MEDICINE

## 2020-07-19 PROCEDURE — 76705 ECHO EXAM OF ABDOMEN: CPT | Performed by: HOSPITALIST

## 2020-07-19 PROCEDURE — 99233 SBSQ HOSP IP/OBS HIGH 50: CPT | Performed by: HOSPITALIST

## 2020-07-19 RX ORDER — VANCOMYCIN HYDROCHLORIDE 125 MG/1
125 CAPSULE ORAL EVERY 6 HOURS
Status: DISCONTINUED | OUTPATIENT
Start: 2020-07-19 | End: 2020-07-30

## 2020-07-19 NOTE — H&P
[facility]  HISTORY AND PHYSICAL       Bibiana Price Patient Status:  Emergency    1980  45year old CSN [id number]   Location  Attending Davis Limon MD     PCP Dalia Hawk MD     ASSESSMENT/PLAN    Acute alcoholic hep allergies.     MEDICATIONS  Patient's Medications   New Prescriptions    No medications on file   Previous Medications    AMITRIPTYLINE HCL 10 MG ORAL TAB    TAKE ONE TABLET BY MOUTH AT BEDTIME     AZELASTINE-FLUTICASONE (DYMISTA) 137-50 MCG/ACT NASAL SUSPE drinks        Types: 3 Standard drinks or equivalent per week        Frequency: 4 or more times a week        Drinks per session: 3 or 4        Binge frequency: Weekly        Comment: about 6 oz of vodka everyday; drinks more during the weekend       Drug nontender  Neuro: Normal reflexes, cranial nerves II through XII intact, strength is symmetric and 5 out of 5 throughout, sensory exam grossly intact, coordination and gait normal.  Skin: Jaundiced  MS: No open wounds, no joint effusions.   No edema  Psych:

## 2020-07-19 NOTE — PLAN OF CARE
Problem: Patient Centered Care  Goal: Patient preferences are identified and integrated in the patient's plan of care  Description  Interventions:  - What would you like us to know as we care for you?  \" I go to a counselor and psychiatrist for my drinki Encourage food from home; allow for food preferences  - Enhance eating environment  Outcome: Progressing     Problem: METABOLIC/FLUID AND ELECTROLYTES - ADULT  Goal: Electrolytes maintained within normal limits  Description  INTERVENTIONS:  - Monitor labs changing drug-related behavior  Description  INTERVENTIONS:  - Administer medication as ordered  - Monitor physical status  - Provide emotional support with 1:1 interaction with staff  - Encourage 12-Step involvement or recovery focused alternative  Outcom

## 2020-07-19 NOTE — CONSULTS
[facility]   Gastroenterology Consultation Note    Daniel Ramon  Patient Status:    Inpatient  Date of Admission:         7/18/2020, Hosp arthritis ital day #1  Attending:   Wilian Marcus MD  PCP:     MD Gerardo Olivera [facility] Other 68        maternal great uncle; unknown etiology   • Bleeding Disorders Neg    • Clotting Disorder Neg    • Anemia Neg       reports that he quit smoking about 11 years ago. His smoking use included cigars. He quit after 5.00 years of use.  His appears ICTERIC , oropharynx clear, mucus membranes appear moist  CV- regular rate and rhythm, the extremities are warm and well perfused   Lung- Moves air well;  No labored breathing  Abdomen- soft, non-tender exam in all quadrants without rigidity or guar clinically with serum amylase/lipase values. 5. Hyperdense kidneys bilaterally, which probably relates to the history of hemochromatosis/renal iron deposition.     Dictated by (CST): Ho Persaud MD on 7/18/2020 at 6:10 PM     Finalized by (CST): Bran identified, these errors have been corrected.  While every attempt is made to correct errors during dictation, discrepancies may still exist.

## 2020-07-19 NOTE — PLAN OF CARE
Problem: Patient/Family Goals  Goal: Patient/Family Long Term Goal  Description  Patient's Long Term Goal: To stay away from alcohol.     Interventions:  - monitor vs, alcohol withdrawal symptoms  -monitor labs, test result  -seizure precautions  - or PO as ordered and tolerated  - Evaluate effectiveness of GI medications  - Encourage mobilization and activity  - Obtain nutritional consult as needed  - Establish a toileting routine/schedule  - Consider collaborating with pharmacy to review patient's concerns and demonstrate effective coping strategies  Description  INTERVENTIONS:  - Assist patient/family to identify coping skills, available support systems and cultural and spiritual values  - Provide emotional support, including active listening and a

## 2020-07-19 NOTE — ED NOTES
Orders for admission, patient is aware of plan and ready to go upstairs. Any questions, please call ED RN Bishnu Schmidt at extension [phone].

## 2020-07-19 NOTE — PROGRESS NOTES
[facility] [facility]  Hospitalist Progress Note     Ismael Lugo Patient Status:  Inpatient    1980  45year old [facility] [id number]   Location 526/526-A Attending Nico Merino MD   Hosp Day # 1 PCP Mary Garcia MD     ASSESSMENT/PLAN 07/18/20  1645 07/19/20  0553   RBC 3.02* 2.36*   HGB 10.6* 8.2*   HCT 30.7* 23.5*   .7* 99.6   MCH 35.1* 34.7*   MCHC 34.5 34.9   RDW 17.0* 17.1*   NEPRELIM 2.14 0.96*   WBC 3.1* 1.5*   .0* 55.0*     Recent Labs   Lab 07/18/20  1645 07/19/20

## 2020-07-20 LAB
ALBUMIN SERPL-MCNC: 2 G/DL (ref 3.4–5)
ALBUMIN SERPL-MCNC: 2.4 G/DL (ref 3.4–5)
ALBUMIN/GLOB SERPL: 0.6 {RATIO} (ref 1–2)
ALBUMIN/GLOB SERPL: 0.6 {RATIO} (ref 1–2)
ALP LIVER SERPL-CCNC: 229 U/L (ref 45–117)
ALP LIVER SERPL-CCNC: 249 U/L (ref 45–117)
ALT SERPL-CCNC: 72 U/L (ref 16–61)
ALT SERPL-CCNC: 77 U/L (ref 16–61)
ANION GAP SERPL CALC-SCNC: 5 MMOL/L (ref 0–18)
ANION GAP SERPL CALC-SCNC: 9 MMOL/L (ref 0–18)
APTT PPP: 39.5 SECONDS (ref 23.2–35.3)
AST SERPL-CCNC: 330 U/L (ref 15–37)
AST SERPL-CCNC: 349 U/L (ref 15–37)
BASOPHILS # BLD AUTO: 0.01 X10(3) UL (ref 0–0.2)
BASOPHILS NFR BLD AUTO: 0.3 %
BILIRUB SERPL-MCNC: 15.2 MG/DL (ref 0.1–2)
BILIRUB SERPL-MCNC: 17.9 MG/DL (ref 0.1–2)
BUN BLD-MCNC: 3 MG/DL (ref 7–18)
BUN BLD-MCNC: 4 MG/DL (ref 7–18)
BUN/CREAT SERPL: 3.3 (ref 10–20)
BUN/CREAT SERPL: 5.7 (ref 10–20)
CALCIUM BLD-MCNC: 8.1 MG/DL (ref 8.5–10.1)
CALCIUM BLD-MCNC: 8.8 MG/DL (ref 8.5–10.1)
CHLORIDE SERPL-SCNC: 102 MMOL/L (ref 98–112)
CHLORIDE SERPL-SCNC: 103 MMOL/L (ref 98–112)
CO2 SERPL-SCNC: 24 MMOL/L (ref 21–32)
CO2 SERPL-SCNC: 27 MMOL/L (ref 21–32)
CREAT BLD-MCNC: 0.7 MG/DL (ref 0.7–1.3)
CREAT BLD-MCNC: 0.92 MG/DL (ref 0.7–1.3)
DEPRECATED RDW RBC AUTO: 65.1 FL (ref 35.1–46.3)
EOSINOPHIL # BLD AUTO: 0.01 X10(3) UL (ref 0–0.7)
EOSINOPHIL NFR BLD AUTO: 0.3 %
ERYTHROCYTE [DISTWIDTH] IN BLOOD BY AUTOMATED COUNT: 17.2 % (ref 11–15)
GLOBULIN PLAS-MCNC: 3.5 G/DL (ref 2.8–4.4)
GLOBULIN PLAS-MCNC: 3.7 G/DL (ref 2.8–4.4)
GLUCOSE BLD-MCNC: 105 MG/DL (ref 70–99)
GLUCOSE BLD-MCNC: 92 MG/DL (ref 70–99)
HAV IGM SER QL: 1.1 MG/DL (ref 1.6–2.6)
HCT VFR BLD AUTO: 25.3 % (ref 39–53)
HGB BLD-MCNC: 8.5 G/DL (ref 13–17.5)
IMM GRANULOCYTES # BLD AUTO: 0.02 X10(3) UL (ref 0–1)
IMM GRANULOCYTES NFR BLD: 0.7 %
INR BLD: 1.38 (ref 0.9–1.2)
INR BLD: 1.42 (ref 0.9–1.2)
LYMPHOCYTES # BLD AUTO: 0.55 X10(3) UL (ref 1–4)
LYMPHOCYTES NFR BLD AUTO: 18.6 %
M PROTEIN MFR SERPL ELPH: 5.5 G/DL (ref 6.4–8.2)
M PROTEIN MFR SERPL ELPH: 6.1 G/DL (ref 6.4–8.2)
MCH RBC QN AUTO: 35.1 PG (ref 26–34)
MCHC RBC AUTO-ENTMCNC: 33.6 G/DL (ref 31–37)
MCV RBC AUTO: 104.5 FL (ref 80–100)
MONOCYTES # BLD AUTO: 0.27 X10(3) UL (ref 0.1–1)
MONOCYTES NFR BLD AUTO: 9.1 %
NEUTROPHILS # BLD AUTO: 2.1 X10 (3) UL (ref 1.5–7.7)
NEUTROPHILS # BLD AUTO: 2.1 X10(3) UL (ref 1.5–7.7)
NEUTROPHILS NFR BLD AUTO: 71 %
OSMOLALITY SERPL CALC.SUM OF ELEC: 277 MOSM/KG (ref 275–295)
OSMOLALITY SERPL CALC.SUM OF ELEC: 277 MOSM/KG (ref 275–295)
PLATELET # BLD AUTO: 62 10(3)UL (ref 150–450)
PLATELET MORPHOLOGY: NORMAL
POTASSIUM SERPL-SCNC: 3.5 MMOL/L (ref 3.5–5.1)
POTASSIUM SERPL-SCNC: 4.3 MMOL/L (ref 3.5–5.1)
POTASSIUM SERPL-SCNC: 4.3 MMOL/L (ref 3.5–5.1)
PROTHROMBIN TIME: 16.7 SECONDS (ref 11.8–14.5)
PROTHROMBIN TIME: 17.1 SECONDS (ref 11.8–14.5)
RBC # BLD AUTO: 2.42 X10(6)UL (ref 4.3–5.7)
SODIUM SERPL-SCNC: 135 MMOL/L (ref 136–145)
SODIUM SERPL-SCNC: 135 MMOL/L (ref 136–145)
WBC # BLD AUTO: 3 X10(3) UL (ref 4–11)

## 2020-07-20 PROCEDURE — 99233 SBSQ HOSP IP/OBS HIGH 50: CPT | Performed by: HOSPITALIST

## 2020-07-20 PROCEDURE — 99232 SBSQ HOSP IP/OBS MODERATE 35: CPT | Performed by: PHYSICIAN ASSISTANT

## 2020-07-20 RX ORDER — MAGNESIUM OXIDE 400 MG (241.3 MG MAGNESIUM) TABLET
800 TABLET ONCE
Status: COMPLETED | OUTPATIENT
Start: 2020-07-20 | End: 2020-07-20

## 2020-07-20 RX ORDER — LORAZEPAM 2 MG/ML
2 INJECTION INTRAMUSCULAR
Status: DISCONTINUED | OUTPATIENT
Start: 2020-07-20 | End: 2020-07-30

## 2020-07-20 RX ORDER — LORAZEPAM 1 MG/1
1 TABLET ORAL
Status: DISCONTINUED | OUTPATIENT
Start: 2020-07-20 | End: 2020-07-30

## 2020-07-20 RX ORDER — CLONAZEPAM 0.5 MG/1
0.25 TABLET ORAL 2 TIMES DAILY PRN
Status: DISCONTINUED | OUTPATIENT
Start: 2020-07-20 | End: 2020-07-27

## 2020-07-20 RX ORDER — MIRTAZAPINE 15 MG/1
15 TABLET, FILM COATED ORAL NIGHTLY
Status: DISCONTINUED | OUTPATIENT
Start: 2020-07-20 | End: 2020-07-23

## 2020-07-20 RX ORDER — LORAZEPAM 2 MG/ML
2 INJECTION INTRAMUSCULAR ONCE
Status: COMPLETED | OUTPATIENT
Start: 2020-07-20 | End: 2020-07-20

## 2020-07-20 RX ORDER — QUETIAPINE 25 MG/1
25 TABLET, FILM COATED ORAL NIGHTLY
Status: DISCONTINUED | OUTPATIENT
Start: 2020-07-20 | End: 2020-07-27

## 2020-07-20 RX ORDER — HALOPERIDOL 5 MG/ML
2 INJECTION INTRAMUSCULAR ONCE
Status: COMPLETED | OUTPATIENT
Start: 2020-07-20 | End: 2020-07-20

## 2020-07-20 RX ORDER — POTASSIUM CHLORIDE 20 MEQ/1
40 TABLET, EXTENDED RELEASE ORAL EVERY 4 HOURS
Status: DISCONTINUED | OUTPATIENT
Start: 2020-07-20 | End: 2020-07-20

## 2020-07-20 RX ORDER — LORAZEPAM 1 MG/1
2 TABLET ORAL
Status: DISCONTINUED | OUTPATIENT
Start: 2020-07-20 | End: 2020-07-30

## 2020-07-20 RX ORDER — POTASSIUM CHLORIDE 20 MEQ/1
40 TABLET, EXTENDED RELEASE ORAL EVERY 4 HOURS
Status: COMPLETED | OUTPATIENT
Start: 2020-07-20 | End: 2020-07-20

## 2020-07-20 RX ORDER — LORAZEPAM 2 MG/ML
1 INJECTION INTRAMUSCULAR
Status: DISCONTINUED | OUTPATIENT
Start: 2020-07-20 | End: 2020-07-30

## 2020-07-20 NOTE — PROGRESS NOTES
[facility] [facility]  Hospitalist Progress Note     Flor Clifton Patient Status:  Inpatient    1980  45year old [facility] [id number]   Location 526/52-A Attending Matty Weldon MD   Hosp Day # 2 PCP Wander Caal MD     ASSESSMENT/PLAN 07/18/20  1645 07/19/20  0553   RBC 3.02* 2.36*   HGB 10.6* 8.2*   HCT 30.7* 23.5*   .7* 99.6   MCH 35.1* 34.7*   MCHC 34.5 34.9   RDW 17.0* 17.1*   NEPRELIM 2.14 0.96*   WBC 3.1* 1.5*   .0* 55.0*     Recent Labs   Lab 07/18/20  1645 07/19/20

## 2020-07-20 NOTE — DIETARY NOTE
ADULT NUTRITION INITIAL ASSESSMENT    Pt is at moderate nutrition risk. Pt meets moderate malnutrition criteria.       CRITERIA FOR MALNUTRITION DIAGNOSIS:  Criteria for non-severe malnutrition diagnosis: chronic illness related to energy intake less zenia diarrhea (9198), Cyclical vomiting, Hemochromatosis, Hyperbilirubinemia, Osteoarthritis, and Pneumonia due to organism. ANTHROPOMETRICS:  HT: 182.9 cm (6')  WT: 61.2 kg (135 lb)   BMI: Body mass index is 18.31 kg/m².   BMI CLASSIFICATION: less than 19 k RELATED PHYSICAL FINDINGS:  - Body Fat/Muscle Mass: mild depletion body fat triceps region and fat overlying rib cage region and mild depletion muscle mass scapular region, shoulder region and dorsal hernadez region.  Overall thin, but muscle and fat deficits

## 2020-07-20 NOTE — PROGRESS NOTES
[facility]     Gastroenterology Progress Note    Gulshan Tracy Patient Status:  Inpatient    1980 MRN [id number]   Location [facility] 5SW/SE Attending Danny Maldonado MD   Hosp Day # 2 PCP Bashir Mckeon MD for developing cirrhosis. # Thrombocytopenia: likely from splenic sequestration d/t above; bone marrow suppression d/t ETOH abuse. # ETOH abuse: recommend evaluation with Dr. Cristina Goldsmith who patient sees outpatient.  Monitor for withdrawal, [facility] protocol 01/21/2020    PHOS 4.4 01/04/2020    TROP <0.045 01/21/2020    B12 1,220 (H) 01/04/2020    ETOH 88 (H) 07/18/2020       Ct Abdomen+pelvis(cpt=74176)    Result Date: 7/18/2020  CONCLUSION:  1. Gallbladder is newly dilated with internal concentrated bile, sl

## 2020-07-20 NOTE — PAYOR COMM NOTE
--------------  ADMISSION REVIEW     Payor: JOHN GARCÍA  Subscriber #:  [id number]  Authorization Number: [id number]    Admit date: 7/18/20  Admit time: 1958       Patient Seen in: [facility] Emergency Department    History   Patient presents with: normal.     Labs Reviewed   COMP METABOLIC PANEL (14) - Abnormal; Notable for the following components:       Result Value    Glucose 180 (*)     Sodium 133 (*)     Potassium 3.3 (*)     Chloride 93 (*)     BUN/CREA Ratio 9.3 (*)     Calcium, Total 8.4 (*) Absolute 0.96 (*)     Lymphocyte Absolute 0.35 (*)      Ct Abdomen+pelvis(cpt=74176)    Result Date: 7/18/2020  CONCLUSION:  1. Gallbladder is newly dilated with internal concentrated bile, sludge or noncalcified stones. No biliary ductal dilatation.   If does not have right upper quadrant tenderness.  -Imaging as above    Hypokalemia.   Nutritional  -Replace per protocol    Other problems  Pancytopenia  Coagulopathy  Hyponatremia, mild    DVT prophylaxis: SCDs  CODE STATUS is full code  Disposition: Admit a CREATSERUM 0.86   GFRAA 126   GFRNAA 109   CA 8.4*   ALB 2.5*   *   K 3.3*   CL 93*   CO2 26.0   ALKPHO 292*   *   *   BILT 13.2*   TP 6.8           7/19/20  ASSESSMENT/PLAN   Acute alcoholic hepatitis.  Bilirubin and transaminases ar 17.0* 17.1*   NEPRELIM 2.14 0.96*   WBC 3.1* 1.5*   .0* 55.0*      Lab 07/18/20  1645 07/19/20  0553   * 95   BUN 8 8   CREATSERUM 0.86 0.74   GFRAA 126 134   GFRNAA 109 116   CA 8.4* 8.1*   ALB 2.5* 2.1*   * 135*   K 3.3* 3.9  3.9   CL

## 2020-07-20 NOTE — BH PROGRESS NOTE
[facility]    Leslie Martin Patient Status:  Inpatient    1980 MRN [id number]   Location [facility] 5SW/SE Attending Deep Camacho MD   Hosp Day # 2 PCP Cornelia Pat MD       S(subjective) \"I think everything has been depressive disorder     P(plan) Psych Liaison discussed case with RN and ordered consult for 7/21/20 for Dr. Janine Pinzon.  Dr. Janine Pinzon notified of consult       [facility], [address]  7/20/2020  4:15 PM

## 2020-07-20 NOTE — PLAN OF CARE
Per patient began hallucinating \"I am starting to see my cat around the room and I can hear my cats and dogs running around scratching the floor. \" MD notified, patient placed on CIWA protocol.  IV ativan given x1  Patient thought he was in a hotel, was re suction as ordered  - Evaluate effectiveness of ordered antiemetic medications  - Provide nonpharmacologic comfort measures as appropriate  - Advance diet as tolerated, if ordered  - Obtain nutritional consult as needed  - Evaluate fluid balance  Outcome: breakdown  - Initiate interventions, skin care algorithm/standards of care as needed  Outcome: Progressing     Problem: ANXIETY  Goal: Will report anxiety at manageable levels  Description  INTERVENTIONS:  - Administer medication as ordered  - Teach and re schedule  Outcome: Progressing

## 2020-07-21 PROBLEM — F33.2 SEVERE EPISODE OF RECURRENT MAJOR DEPRESSIVE DISORDER, WITHOUT PSYCHOTIC FEATURES (HCC): Status: ACTIVE | Noted: 2020-07-21

## 2020-07-21 PROBLEM — F10.20 ALCOHOL DEPENDENCE, CONTINUOUS (HCC): Status: ACTIVE | Noted: 2020-07-21

## 2020-07-21 PROBLEM — F10.231 ALCOHOL WITHDRAWAL WITH DELIRIUM (HCC): Status: ACTIVE | Noted: 2019-09-02

## 2020-07-21 PROBLEM — F10.931 ALCOHOL WITHDRAWAL WITH DELIRIUM (HCC): Status: ACTIVE | Noted: 2019-09-02

## 2020-07-21 LAB
ALBUMIN SERPL-MCNC: 2.2 G/DL (ref 3.4–5)
ALBUMIN/GLOB SERPL: 0.6 {RATIO} (ref 1–2)
ALP LIVER SERPL-CCNC: 247 U/L (ref 45–117)
ALT SERPL-CCNC: 70 U/L (ref 16–61)
AMPHET UR QL SCN: NEGATIVE
ANION GAP SERPL CALC-SCNC: 5 MMOL/L (ref 0–18)
AST SERPL-CCNC: 271 U/L (ref 15–37)
BASOPHILS # BLD AUTO: 0.01 X10(3) UL (ref 0–0.2)
BASOPHILS NFR BLD AUTO: 0.4 %
BENZODIAZ UR QL SCN: NEGATIVE
BILIRUB SERPL-MCNC: 17.2 MG/DL (ref 0.1–2)
BUN BLD-MCNC: 4 MG/DL (ref 7–18)
BUN/CREAT SERPL: 5.7 (ref 10–20)
CALCIUM BLD-MCNC: 8.7 MG/DL (ref 8.5–10.1)
CANNABINOIDS UR QL SCN: NEGATIVE
CHLORIDE SERPL-SCNC: 107 MMOL/L (ref 98–112)
CO2 SERPL-SCNC: 27 MMOL/L (ref 21–32)
COCAINE UR QL: NEGATIVE
CREAT BLD-MCNC: 0.7 MG/DL (ref 0.7–1.3)
CREAT UR-SCNC: 67.1 MG/DL
DEPRECATED RDW RBC AUTO: 70.6 FL (ref 35.1–46.3)
EOSINOPHIL # BLD AUTO: 0.02 X10(3) UL (ref 0–0.7)
EOSINOPHIL NFR BLD AUTO: 0.9 %
ERYTHROCYTE [DISTWIDTH] IN BLOOD BY AUTOMATED COUNT: 18.1 % (ref 11–15)
GLOBULIN PLAS-MCNC: 4 G/DL (ref 2.8–4.4)
GLUCOSE BLD-MCNC: 84 MG/DL (ref 70–99)
HAV IGM SER QL: 1.3 MG/DL (ref 1.6–2.6)
HCT VFR BLD AUTO: 26.8 % (ref 39–53)
HGB BLD-MCNC: 8.6 G/DL (ref 13–17.5)
IMM GRANULOCYTES # BLD AUTO: 0.01 X10(3) UL (ref 0–1)
IMM GRANULOCYTES NFR BLD: 0.4 %
INR BLD: 1.37 (ref 0.9–1.2)
LYMPHOCYTES # BLD AUTO: 0.59 X10(3) UL (ref 1–4)
LYMPHOCYTES NFR BLD AUTO: 25.5 %
M PROTEIN MFR SERPL ELPH: 6.2 G/DL (ref 6.4–8.2)
MCH RBC QN AUTO: 34.4 PG (ref 26–34)
MCHC RBC AUTO-ENTMCNC: 32.1 G/DL (ref 31–37)
MCV RBC AUTO: 107.2 FL (ref 80–100)
MDMA UR QL SCN: NEGATIVE
MONOCYTES # BLD AUTO: 0.27 X10(3) UL (ref 0.1–1)
MONOCYTES NFR BLD AUTO: 11.7 %
NEUTROPHILS # BLD AUTO: 1.41 X10 (3) UL (ref 1.5–7.7)
NEUTROPHILS # BLD AUTO: 1.41 X10(3) UL (ref 1.5–7.7)
NEUTROPHILS NFR BLD AUTO: 61.1 %
OPIATES UR QL SCN: NEGATIVE
OSMOLALITY SERPL CALC.SUM OF ELEC: 284 MOSM/KG (ref 275–295)
OXYCODONE UR QL SCN: NEGATIVE
PLATELET # BLD AUTO: 51 10(3)UL (ref 150–450)
POTASSIUM SERPL-SCNC: 4.7 MMOL/L (ref 3.5–5.1)
PROTHROMBIN TIME: 16.6 SECONDS (ref 11.8–14.5)
RBC # BLD AUTO: 2.5 X10(6)UL (ref 4.3–5.7)
SODIUM SERPL-SCNC: 139 MMOL/L (ref 136–145)
WBC # BLD AUTO: 2.3 X10(3) UL (ref 4–11)

## 2020-07-21 PROCEDURE — 99233 SBSQ HOSP IP/OBS HIGH 50: CPT | Performed by: HOSPITALIST

## 2020-07-21 PROCEDURE — 90792 PSYCH DIAG EVAL W/MED SRVCS: CPT | Performed by: OTHER

## 2020-07-21 PROCEDURE — 99232 SBSQ HOSP IP/OBS MODERATE 35: CPT | Performed by: PHYSICIAN ASSISTANT

## 2020-07-21 RX ORDER — LORAZEPAM 2 MG/ML
0.5 INJECTION INTRAMUSCULAR 4 TIMES DAILY
Status: DISCONTINUED | OUTPATIENT
Start: 2020-07-21 | End: 2020-07-23

## 2020-07-21 NOTE — PROGRESS NOTES
[place] [facility]  Hospitalist Progress Note     Rashi Meehan Patient Status:  Inpatient    1980  45year old CSN [id number]   Location 526/526-A Attending Puja Simons MD   Hosp Day # 3 PCP Daniela Ruiz MD     ASSESSMENT/PLAN the outstretched hands. Skin: Jaundiced  MS: No open wounds, no joint effusions. No edema  Psych:    Paranoid, overall calm.     Labs:  Recent Labs   Lab 07/19/20  0553 07/20/20  1533 07/21/20  0651   RBC 2.36* 2.42* 2.50*   HGB 8.2* 8.5* 8.6*   HCT 23.5*

## 2020-07-21 NOTE — PAYOR COMM NOTE
--------------  CONTINUED STAY REVIEW    Payor: JOHN GARCÍA  Subscriber #:  [id number]  Authorization Number: [id number]    Admit date: 7/18/20  Admit time: [address] 7/20/20- 7/21/20 7/20/20   ASSESSMENT/PLAN     Acute alcoholic h 34.7*   MCHC 34.5 34.9   RDW 17.0* 17.1*   NEPRELIM 2.14 0.96*   WBC 3.1* 1.5*   .0* 55.0*      Lab 07/18/20  1645 07/19/20  0553 07/20/20  0615   * 95 92   BUN 8 8 4*   CREATSERUM 0.86 0.74 0.70   GFRAA 126 134 137   GFRNAA 109 116 119   CA gallop, rub, murmur.    Pulm: Effort and breath sounds normal. No distress, wheezes, rales, rhonchi. Abd: Soft, mildly distended, nontender  Neuro:  Limited exam based on cooperation. He is moving all 4 extremities well.   There is a mild tremor in the ou 7/20/2020 2126 Given 15 mg Oral       Montelukast Sodium (SINGULAIR) tab 10 mg     Date Action Dose Route     7/20/2020 1944 Given 10 mg Oral       Pantoprazole Sodium (PROTONIX) EC tab 40 mg     Date Action Dose Route     7/21/2020 0436 Given 40 mg Oral

## 2020-07-21 NOTE — PLAN OF CARE
Pt is alert and oriented. Pt presented very agitated at being of shift change trying to get out of bed and not listening to staff. Security was called. Md called for restraint order and pt given IM injection to calm down. Pt CIWA scores have stabilized.  Wi appropriate  - Advance diet as tolerated, if ordered  - Obtain nutritional consult as needed  - Evaluate fluid balance  Outcome: Progressing  Goal: Maintains or returns to baseline bowel function  Description  INTERVENTIONS:  - Assess bowel function  - Anne Hardwick Will report anxiety at manageable levels  Description  INTERVENTIONS:  - Administer medication as ordered  - Teach and rehearse alternative coping skills  - Provide emotional support with 1:1 interaction with staff  Outcome: Progressing     Problem: COPING self-harm  Description  INTERVENTIONS:  - Apply the least restrictive restraint to prevent harm  - Notify patient and family of reasons restraints applied  - Assess for any contributing factors to confusion (electrolyte disturbances, delirium, medications)

## 2020-07-21 NOTE — PLAN OF CARE
Problem: Patient/Family Goals  Goal: Patient/Family Long Term Goal  Description  Patient's Long Term Goal: To stay away from alcohol.     Interventions:  - monitor vs, alcohol withdrawal symptoms  -monitor labs, test result  -seizure precautions  - or PO as ordered and tolerated  - Evaluate effectiveness of GI medications  - Encourage mobilization and activity  - Obtain nutritional consult as needed  - Establish a toileting routine/schedule  - Consider collaborating with pharmacy to review patient's concerns and demonstrate effective coping strategies  Description  INTERVENTIONS:  - Assist patient/family to identify coping skills, available support systems and cultural and spiritual values  - Provide emotional support, including active listening and a possible  - Assess the patient's physical comfort, circulation, skin condition, hydration, nutrition and elimination needs   - Reorient and redirection as needed  - Assess for the need to continue restraints  Outcome: Progressing   Restraints discontinued

## 2020-07-21 NOTE — PROGRESS NOTES
[facility]     Gastroenterology Progress Note    Vale Prior Patient Status:  Inpatient    1980 MRN [id number]   Location [facility] 5SW/SE Attending Conrad Allison MD   Hosp Day # 4 PCP Ernesto Mae MD persists (~57K 7/22) likely from splenic sequestration d/t above; bone marrow suppression d/t ETOH abuse. # ETOH abuse: recommend evaluation with Dr. Diana Esquivel who patient sees outpatient.  Monitor for withdrawal, CIWA protocol, IV thiamine/folate and mult 01/21/2020    DDIMER 0.75 (H) 01/21/2020    MG 1.9 07/22/2020    PHOS 4.4 01/04/2020    TROP <0.045 01/21/2020    B12 1,220 (H) 01/04/2020    ETOH 88 (H) 07/18/2020       No results found.

## 2020-07-22 LAB
ALBUMIN SERPL-MCNC: 1.7 G/DL (ref 3.4–5)
ALBUMIN/GLOB SERPL: 0.5 {RATIO} (ref 1–2)
ALP LIVER SERPL-CCNC: 221 U/L (ref 45–117)
ALT SERPL-CCNC: 53 U/L (ref 16–61)
ANION GAP SERPL CALC-SCNC: 8 MMOL/L (ref 0–18)
AST SERPL-CCNC: 192 U/L (ref 15–37)
BASOPHILS # BLD AUTO: 0.01 X10(3) UL (ref 0–0.2)
BASOPHILS NFR BLD AUTO: 0.4 %
BILIRUB SERPL-MCNC: 16 MG/DL (ref 0.1–2)
BUN BLD-MCNC: 4 MG/DL (ref 7–18)
BUN/CREAT SERPL: 5.3 (ref 10–20)
CALCIUM BLD-MCNC: 7.7 MG/DL (ref 8.5–10.1)
CHLORIDE SERPL-SCNC: 110 MMOL/L (ref 98–112)
CO2 SERPL-SCNC: 23 MMOL/L (ref 21–32)
CREAT BLD-MCNC: 0.75 MG/DL (ref 0.7–1.3)
DEPRECATED RDW RBC AUTO: 71.6 FL (ref 35.1–46.3)
EOSINOPHIL # BLD AUTO: 0.02 X10(3) UL (ref 0–0.7)
EOSINOPHIL NFR BLD AUTO: 0.8 %
ERYTHROCYTE [DISTWIDTH] IN BLOOD BY AUTOMATED COUNT: 18.8 % (ref 11–15)
GLOBULIN PLAS-MCNC: 3.5 G/DL (ref 2.8–4.4)
GLUCOSE BLD-MCNC: 71 MG/DL (ref 70–99)
HAV IGM SER QL: 1.9 MG/DL (ref 1.6–2.6)
HCT VFR BLD AUTO: 23.6 % (ref 39–53)
HGB BLD-MCNC: 7.8 G/DL (ref 13–17.5)
IMM GRANULOCYTES # BLD AUTO: 0.02 X10(3) UL (ref 0–1)
IMM GRANULOCYTES NFR BLD: 0.8 %
INR BLD: 1.39 (ref 0.9–1.2)
LYMPHOCYTES # BLD AUTO: 0.48 X10(3) UL (ref 1–4)
LYMPHOCYTES NFR BLD AUTO: 19.4 %
M PROTEIN MFR SERPL ELPH: 5.2 G/DL (ref 6.4–8.2)
MCH RBC QN AUTO: 35.3 PG (ref 26–34)
MCHC RBC AUTO-ENTMCNC: 33.1 G/DL (ref 31–37)
MCV RBC AUTO: 106.8 FL (ref 80–100)
MONOCYTES # BLD AUTO: 0.39 X10(3) UL (ref 0.1–1)
MONOCYTES NFR BLD AUTO: 15.7 %
NEUTROPHILS # BLD AUTO: 1.56 X10 (3) UL (ref 1.5–7.7)
NEUTROPHILS # BLD AUTO: 1.56 X10(3) UL (ref 1.5–7.7)
NEUTROPHILS NFR BLD AUTO: 62.9 %
OSMOLALITY SERPL CALC.SUM OF ELEC: 287 MOSM/KG (ref 275–295)
PLATELET # BLD AUTO: 57 10(3)UL (ref 150–450)
POTASSIUM SERPL-SCNC: 4.4 MMOL/L (ref 3.5–5.1)
PROTHROMBIN TIME: 16.8 SECONDS (ref 11.8–14.5)
RBC # BLD AUTO: 2.21 X10(6)UL (ref 4.3–5.7)
SODIUM SERPL-SCNC: 141 MMOL/L (ref 136–145)
WBC # BLD AUTO: 2.5 X10(3) UL (ref 4–11)

## 2020-07-22 PROCEDURE — 99233 SBSQ HOSP IP/OBS HIGH 50: CPT | Performed by: OTHER

## 2020-07-22 PROCEDURE — 99232 SBSQ HOSP IP/OBS MODERATE 35: CPT | Performed by: PHYSICIAN ASSISTANT

## 2020-07-22 PROCEDURE — 99233 SBSQ HOSP IP/OBS HIGH 50: CPT | Performed by: HOSPITALIST

## 2020-07-22 NOTE — PLAN OF CARE
Pt is alert and oriented. Pt restraints discontinued. CIWA score have been consistently 0 for a while. Call light in reach. Will continue to monitor.        Problem: Patient/Family Goals  Goal: Patient/Family Long Term Goal  Description  Patient's Long Term Progressing  Goal: Maintains or returns to baseline bowel function  Description  INTERVENTIONS:  - Assess bowel function  - Maintain adequate hydration with IV or PO as ordered and tolerated  - Evaluate effectiveness of GI medications  - Encourage mobiliza alternative coping skills  - Provide emotional support with 1:1 interaction with staff  Outcome: Progressing     Problem: COPING  Goal: Pt/Family able to verbalize concerns and demonstrate effective coping strategies  Description  INTERVENTIONS:  - Assist applied  - Assess for any contributing factors to confusion (electrolyte disturbances, delirium, medications)  - Discontinue any unnecessary medical devices as soon as possible  - Assess the patient's physical comfort, circulation, skin condition, hydratio

## 2020-07-22 NOTE — CONSULTS
[facility]    Report of Consultation    Leslie Dicknatalie Patient Status:  Inpatient    1980 MRN [id number]   Location [facility] 5SW/SE Attending Deep Camacho MD   Hosp Day # 3 PCP Cornelia Pat MD     Date of Admis started drinking at age 26 when he went to college.   On his first job in 2005 patient start to have the habit to drink in the train going to work and some time waiting for the strain and go into the bar and he felt that his sense of loneliness and distress admitted that his grandmother on deathbed and although she is in her late 80 he still feel overwhelmed and stressed with it.     Psych history: The patient has been seeing therapist Anuja Blevins for the last 4 years for alcohol control after he lost his first j Cigars        Quit date: 2009        Years since quittin.2      Smokeless tobacco: Current User        Types: Chew      Tobacco comment: Wife smokes, some passive exposure. Alcohol use:  Yes      Alcohol/week: 2.5 standard drinks      Types: 3 10 mg by mouth daily.  )  AMITRIPTYLINE HCL 10 MG Oral Tab, TAKE ONE TABLET BY MOUTH AT BEDTIME  (Patient taking differently: Take 10 mg by mouth daily.  )  ondansetron 4 MG Oral Tablet Dispersible, Take 1 tablet (4 mg total) by mouth every 8 (eight) hours temperature 98.5 °F (36.9 °C), temperature source Axillary, resp. rate 18, height 72\", weight 61.2 kg (135 lb), SpO2 99 %. Mental Status Exam:     The patient is a stated age male skinny laying down in his bed.   The patient presented with jaundice and CBC With Differential With Platelet      Comp Metabolic Panel (14)      Prothrombin Time (PT)      Ammonia, Plasma      Ethyl Alcohol      CBC With Differential With Platelet      Comp Metabolic Panel (14)      Prothrombin Time (PT)      Potassium      Sca

## 2020-07-22 NOTE — PROGRESS NOTES
[facility]     Gastroenterology Progress Note    Gulshan Tracy Patient Status:  Inpatient    1980 MRN [id number]   Location [facility] 5SW/SE Attending Danny Maldonado MD   Hosp Day # 3 PCP Bashir Mckeon MD does not decrease tomorrow despite adequate PO intake and correction of electrolytes, can consider prednisolone versus Trental.    # Thrombocytopenia: persists (~51K 7/21) likely from splenic sequestration d/t above; bone marrow suppression d/t ETOH abuse. ALB 1.7 (L) 07/23/2020    ALKPHO 236 (H) 07/23/2020    BILT 17.2 (H) 07/23/2020    TP 5.0 (L) 07/23/2020     (H) 07/23/2020    ALT 44 07/23/2020    PTT 39.5 (H) 07/20/2020    INR 1.39 (H) 07/22/2020    T4F 1.2 04/10/2019    TSH 1.180 04/21/2020    L

## 2020-07-22 NOTE — PROGRESS NOTES
[facility]  Hospitalist Progress Note     Cloteal Hedger Patient Status:  Inpatient    1980  45year old CSN [id number]   Location 526/526-A Attending Rob Castaneda MD   Hosp Day # 4 PCP Radha Mcgee MD     ASSESSMENT/PLAN breath sounds normal. No distress, wheezes, rales, rhonchi. Abd: Soft, mildly distended, nontender  Neuro:  He is moving all 4 extremities well. There is a mild tremor in the outstretched hands. Skin: Jaundiced  MS: No open wounds, no joint effusions.

## 2020-07-22 NOTE — PLAN OF CARE
Problem: Patient/Family Goals  Goal: Patient/Family Long Term Goal  Description  Patient's Long Term Goal: To stay away from alcohol.     Interventions:  - monitor vs, alcohol withdrawal symptoms  -monitor labs, test result  -seizure precautions  - or PO as ordered and tolerated  - Evaluate effectiveness of GI medications  - Encourage mobilization and activity  - Obtain nutritional consult as needed  - Establish a toileting routine/schedule  - Consider collaborating with pharmacy to review patient's concerns and demonstrate effective coping strategies  Description  INTERVENTIONS:  - Assist patient/family to identify coping skills, available support systems and cultural and spiritual values  - Provide emotional support, including active listening and a possible  - Assess the patient's physical comfort, circulation, skin condition, hydration, nutrition and elimination needs   - Reorient and redirection as needed  - Assess for the need to continue restraints  Outcome: Progressing   Patient calm, sinus tach

## 2020-07-23 ENCOUNTER — APPOINTMENT (OUTPATIENT)
Dept: GENERAL RADIOLOGY | Facility: HOSPITAL | Age: 40
DRG: 433 | End: 2020-07-23
Attending: HOSPITALIST
Payer: COMMERCIAL

## 2020-07-23 LAB
ALBUMIN SERPL-MCNC: 1.7 G/DL (ref 3.4–5)
ALBUMIN/GLOB SERPL: 0.5 {RATIO} (ref 1–2)
ALP LIVER SERPL-CCNC: 236 U/L (ref 45–117)
ALT SERPL-CCNC: 44 U/L (ref 16–61)
ANION GAP SERPL CALC-SCNC: 10 MMOL/L (ref 0–18)
AST SERPL-CCNC: 156 U/L (ref 15–37)
BASOPHILS # BLD AUTO: 0.02 X10(3) UL (ref 0–0.2)
BASOPHILS NFR BLD AUTO: 0.7 %
BILIRUB SERPL-MCNC: 17.2 MG/DL (ref 0.1–2)
BILIRUB UR QL: POSITIVE
BUN BLD-MCNC: 8 MG/DL (ref 7–18)
BUN/CREAT SERPL: 9.6 (ref 10–20)
CALCIUM BLD-MCNC: 7.6 MG/DL (ref 8.5–10.1)
CHLORIDE SERPL-SCNC: 107 MMOL/L (ref 98–112)
CO2 SERPL-SCNC: 20 MMOL/L (ref 21–32)
CREAT BLD-MCNC: 0.83 MG/DL (ref 0.7–1.3)
DEPRECATED RDW RBC AUTO: 74.9 FL (ref 35.1–46.3)
EOSINOPHIL # BLD AUTO: 0.02 X10(3) UL (ref 0–0.7)
EOSINOPHIL NFR BLD AUTO: 0.7 %
ERYTHROCYTE [DISTWIDTH] IN BLOOD BY AUTOMATED COUNT: 19.7 % (ref 11–15)
GLOBULIN PLAS-MCNC: 3.3 G/DL (ref 2.8–4.4)
GLUCOSE BLD-MCNC: 88 MG/DL (ref 70–99)
GLUCOSE UR-MCNC: NEGATIVE MG/DL
HAV IGM SER QL: 2 MG/DL (ref 1.6–2.6)
HCT VFR BLD AUTO: 22.9 % (ref 39–53)
HGB BLD-MCNC: 7.7 G/DL (ref 13–17.5)
IMM GRANULOCYTES # BLD AUTO: 0.02 X10(3) UL (ref 0–1)
IMM GRANULOCYTES NFR BLD: 0.7 %
KETONES UR-MCNC: NEGATIVE MG/DL
LEUKOCYTE ESTERASE UR QL STRIP.AUTO: NEGATIVE
LYMPHOCYTES # BLD AUTO: 0.52 X10(3) UL (ref 1–4)
LYMPHOCYTES NFR BLD AUTO: 17.5 %
M PROTEIN MFR SERPL ELPH: 5 G/DL (ref 6.4–8.2)
MCH RBC QN AUTO: 35.5 PG (ref 26–34)
MCHC RBC AUTO-ENTMCNC: 33.6 G/DL (ref 31–37)
MCV RBC AUTO: 105.5 FL (ref 80–100)
MONOCYTES # BLD AUTO: 0.62 X10(3) UL (ref 0.1–1)
MONOCYTES NFR BLD AUTO: 20.9 %
NEUTROPHILS # BLD AUTO: 1.77 X10 (3) UL (ref 1.5–7.7)
NEUTROPHILS # BLD AUTO: 1.77 X10(3) UL (ref 1.5–7.7)
NEUTROPHILS NFR BLD AUTO: 59.5 %
NITRITE UR QL STRIP.AUTO: NEGATIVE
OSMOLALITY SERPL CALC.SUM OF ELEC: 282 MOSM/KG (ref 275–295)
PH UR: 6 [PH] (ref 5–8)
PLATELET # BLD AUTO: 67 10(3)UL (ref 150–450)
POTASSIUM SERPL-SCNC: 3.5 MMOL/L (ref 3.5–5.1)
PROCALCITONIN SERPL-MCNC: 1.61 NG/ML (ref ?–0.16)
PROT UR-MCNC: NEGATIVE MG/DL
RBC # BLD AUTO: 2.17 X10(6)UL (ref 4.3–5.7)
RBC #/AREA URNS AUTO: 3 /HPF
RBC CASTS #/AREA UR COMP ASSIST: 3 /LPF
SODIUM SERPL-SCNC: 137 MMOL/L (ref 136–145)
SP GR UR STRIP: 1.01 (ref 1–1.03)
UROBILINOGEN UR STRIP-ACNC: 4
WBC # BLD AUTO: 3 X10(3) UL (ref 4–11)
WBC #/AREA URNS AUTO: 1 /HPF

## 2020-07-23 PROCEDURE — 99233 SBSQ HOSP IP/OBS HIGH 50: CPT | Performed by: HOSPITALIST

## 2020-07-23 PROCEDURE — 99232 SBSQ HOSP IP/OBS MODERATE 35: CPT | Performed by: OTHER

## 2020-07-23 PROCEDURE — 71045 X-RAY EXAM CHEST 1 VIEW: CPT | Performed by: HOSPITALIST

## 2020-07-23 PROCEDURE — 99232 SBSQ HOSP IP/OBS MODERATE 35: CPT | Performed by: PHYSICIAN ASSISTANT

## 2020-07-23 RX ORDER — PENTOXIFYLLINE 400 MG/1
400 TABLET, EXTENDED RELEASE ORAL
Status: DISCONTINUED | OUTPATIENT
Start: 2020-07-23 | End: 2020-07-28

## 2020-07-23 RX ORDER — PROPRANOLOL HYDROCHLORIDE 20 MG/1
10 TABLET ORAL 3 TIMES DAILY
Status: DISCONTINUED | OUTPATIENT
Start: 2020-07-23 | End: 2020-07-27

## 2020-07-23 RX ORDER — POTASSIUM CHLORIDE 20 MEQ/1
40 TABLET, EXTENDED RELEASE ORAL EVERY 4 HOURS
Status: COMPLETED | OUTPATIENT
Start: 2020-07-23 | End: 2020-07-23

## 2020-07-23 RX ORDER — MAGNESIUM SULFATE HEPTAHYDRATE 40 MG/ML
2 INJECTION, SOLUTION INTRAVENOUS DAILY
Status: DISCONTINUED | OUTPATIENT
Start: 2020-07-24 | End: 2020-07-27

## 2020-07-23 RX ORDER — MIRTAZAPINE 15 MG/1
7.5 TABLET, FILM COATED ORAL NIGHTLY
Status: DISCONTINUED | OUTPATIENT
Start: 2020-07-24 | End: 2020-07-27

## 2020-07-23 RX ORDER — LORAZEPAM 2 MG/ML
0.5 INJECTION INTRAMUSCULAR 3 TIMES DAILY
Status: DISCONTINUED | OUTPATIENT
Start: 2020-07-23 | End: 2020-07-27

## 2020-07-23 NOTE — PAYOR COMM NOTE
--------------  CONTINUED STAY REVIEW    Payor: JOHN GARCÍA  Subscriber #:  [id number]  Authorization Number: [id number]    Admit date: 7/18/20  Admit time: [id number] 7/23/20 7/23/20   ASSESSMENT/PLAN     Acute alcoholic hepatiti answer complex questions. He knows he is in the hospital.  No chest pain or shortness of breath. No cough, no abdominal pain.   Overall much better, still very jaundiced  Oral intake moderate     OBJECTIVE  Temp:  [98.5 °F (36.9 °C)-100.5 °F (38.1 °C)] 99 7/23/2020 0930 New Bag 4 g Intravenous Sarkis Francisco, RN      mirtazapine (REMERON) tab 15 mg     Date Action Dose Route User    7/22/2020 2107 Given 15 mg Oral Denisha Reamer      Montelukast Sodium (SINGULAIR) tab 10 mg     Date Action Dose Route User

## 2020-07-23 NOTE — PROGRESS NOTES
Patient developed temp of 100.5 early this am.  CXR vision report is bilateral lower lobe linear infiltrates with small bilateral pleural effusions. Impression was atelectasis vs mild aspiration pneumonia or infection.   Pt is going through DTs and so is a

## 2020-07-23 NOTE — PROGRESS NOTES
Therese Rosas is a 45year old   male with history of alcoholism, depression, osteoarthritis and cyclic vomiting who presented to the hospital complaining of jaundice and found with ascites and acute alcoholic hepatitis.   The patient seen INTRAOCULAR LENS INSERTION Bilateral 2012      Family History   Problem Relation Age of Onset   • Cataracts Mother    • Cancer Other 68        maternal great uncle; unknown etiology   • Bleeding Disorders Neg    • Clotting Disorder Neg    • Anemia Neg QAM AC        Allergies:  No Known Allergies    Laboratory Data:  Lab Results   Component Value Date    WBC 2.5 (L) 07/22/2020    HGB 7.8 (L) 07/22/2020    HCT 23.6 (L) 07/22/2020    PLT 57.0 (L) 07/22/2020    CREATSERUM 0.75 07/22/2020    BUN 4 (L) 07/22/ severe alcoholic hepatitis and more disturbance in his liver function. Patient had delirium episode resolved today.     At this point, I would recommend the following approach:      1. Focus on education and support.   2.  Psychotherapy focusing on insigh

## 2020-07-23 NOTE — PROGRESS NOTES
[facility]     Gastroenterology Progress Note    Ronald Way Patient Status:  Inpatient    1980 MRN [id number]   Location [facility] 5SW/SE Attending Marquis Amita MD   Hosp Day # 5 PCP Eden Toure MD disease and liver disease. # ETOH abuse: appreciate evaluations with Dr. Gume Magana who patient sees outpatient. Monitor for withdrawal, CIWA protocol, IV thiamine/folate and multivitamin. # Agitation: improved     # Hx of C.  Diff: PCR (+) 7/19 - unclea 07/22/2020     07/22/2020    K 4.4 07/22/2020     07/22/2020    CO2 23.0 07/22/2020    GLU 71 07/22/2020    CA 7.7 (L) 07/22/2020    ALB 1.7 (L) 07/22/2020    ALKPHO 221 (H) 07/22/2020    BILT 16.0 (H) 07/22/2020    TP 5.2 (L) 07/22/2020    AST

## 2020-07-23 NOTE — OCCUPATIONAL THERAPY NOTE
OCCUPATIONAL THERAPY EVALUATION - INPATIENT     Room Number: 526/526-A  Evaluation Date: 7/23/2020  Type of Evaluation: Initial  Presenting Problem: (weakness;jaundice)    Physician Order: IP Consult to Occupational Therapy  Reason for Therapy: ADL/IADL Dy with this level of impairment may benefit from RODO.      DISCHARGE RECOMMENDATIONS  OT Discharge Recommendations: 24 hour care/supervision;Sub-acute rehabilitation  OT Device Recommendations: TBD    PLAN  OT Treatment Plan: Patient/Family training;Patient/F TOLERANCE  fair    COGNITION  Alert and oriented    Communication: speech quiet and at times difficult to understand    Behavioral/Emotional/Social: cooperative    RANGE OF MOTION   Upper extremity ROM is within functional limits     STRENGTH ASSESSMENT  U standing for 3 minutes w/ cga and rw in prep for adls   Comment:    Patient will complete le dressing with supervision  Comment:          Goals  on: 20  Frequency: 3x/week

## 2020-07-23 NOTE — PHYSICAL THERAPY NOTE
PHYSICAL THERAPY EVALUATION - INPATIENT     Room Number: 526/526-A  Evaluation Date: 7/23/2020  Type of Evaluation: Initial   Physician Order: PT Eval and Treat    Presenting Problem: ascites due to alcoholic hepatitis; jaundice; CIWA  Reason for Therapy: training;Gait training;Family education;Patient education; Energy conservation; Endurance; Body mechanics; Coordination;Balance training;Stair training;Stoop training;Strengthening  Rehab Potential : Fair  Frequency (Obs): 3-5x/week       PHYSICAL THERAPY MEDI COGNITION  · Overall Cognitive Status:  Impaired  · Following Commands:  follows multi-step commands inconsistently, follows multistep commands with increased time and follows multistep commands with repetition  · Motor Planning: impaired  · Safety [place] mechanics  Energy conservation  Gait training  Transfer training    Patient End of Session: Up in chair;Needs met; With [facility] staff;Call light within reach;RN aware of session/findings; All patient questions and concerns addressed; Alarm set(RN present in room)

## 2020-07-23 NOTE — PLAN OF CARE
Patient is alert and oriented, vss, still having hallucinations stating \"there's ants all over the shirt\" skin still appearing yellow. Procalcitonin elevated, Dr. Soledad Silva aware.    Has poor appetite, encouraged to try taking the supplements in addition medication as ordered  - Monitor physical status  - Provide emotional support with 1:1 interaction with staff  - Encourage 12-Step involvement or recovery focused alternative  Outcome: Progressing     Problem: SAFETY ADULT - FALL  Goal: Free from fall inju

## 2020-07-23 NOTE — PROGRESS NOTES
[place] [facility] - [facility]  Hospitalist Progress Note     Josefina Howard Patient Status:  Inpatient    1980  45year old CSN [id number]   Location 526/526-A Attending Valentin Simpson MD   Hosp Day # 5 PCP Shayna Morris MD     ASSESSMENT/PLAN GI    SUBJECTIVE  + Fever 100.5 last night  Interactive, however still having hallucinations (ants are crawling on the skin). He is not able to answer complex questions. He knows he is in the hospital.  No chest pain or shortness of breath.   No cough, QUEtiapine Fumarate  25 mg Oral Nightly   • mirtazapine  15 mg Oral Nightly   • vancomycin HCl  125 mg Oral Q6H   • Montelukast Sodium  10 mg Oral Nightly   • multivitamin  1 tablet Oral Daily   • Pantoprazole Sodium  40 mg Oral QAM AC     LORazepam **OR**

## 2020-07-23 NOTE — BH PROGRESS NOTE
Behavioral Health Note:  Psych Liaison met with Celso Held to discuss engagement in dual dx PHP at [facility] following discharge from medical admission.      Psych Liaison answered questions about program and Celso Held expressed understanding and agreement that orientation and detox  3. Referred to dual dx PHP at [facility]  4.  Psych Liaison will continue follow up with Antelmo Meehan and [facility] to discuss start date to line up with day following discharge from medical admission if possible    [facility], [facility]

## 2020-07-23 NOTE — PLAN OF CARE
Patient resting in bed comfortably. Alert, oriented x 4. Vital signs stable. Denied pain. Scheduled ativan given, patient stated he is \"feeling better\" tonight. IV fluids infusing. P.O vancomycin given. Contact/enteric precautions maintained.  This manjulanin hydration with IV or PO as ordered and tolerated  - Nasogastric tube to low intermittent suction as ordered  - Evaluate effectiveness of ordered antiemetic medications  - Provide nonpharmacologic comfort measures as appropriate  - Advance diet as tolerated Assess and document skin integrity  - Monitor for areas of redness and/or skin breakdown  - Initiate interventions, skin care algorithm/standards of care as needed  Outcome: Progressing     Problem: ANXIETY  Goal: Will report anxiety at manageable levels OT/PT consult to assist with strengthening/mobility  - Encourage toileting schedule  Outcome: Progressing

## 2020-07-24 LAB
ALBUMIN SERPL-MCNC: 1.7 G/DL (ref 3.4–5)
ALP LIVER SERPL-CCNC: 246 U/L (ref 45–117)
ALT SERPL-CCNC: 44 U/L (ref 16–61)
AST SERPL-CCNC: 140 U/L (ref 15–37)
BASOPHILS # BLD AUTO: 0.02 X10(3) UL (ref 0–0.2)
BASOPHILS NFR BLD AUTO: 0.5 %
BILIRUB DIRECT SERPL-MCNC: 13.3 MG/DL (ref 0–0.2)
BILIRUB SERPL-MCNC: 16.1 MG/DL (ref 0.1–2)
DEPRECATED RDW RBC AUTO: 78.7 FL (ref 35.1–46.3)
EOSINOPHIL # BLD AUTO: 0.01 X10(3) UL (ref 0–0.7)
EOSINOPHIL NFR BLD AUTO: 0.3 %
ERYTHROCYTE [DISTWIDTH] IN BLOOD BY AUTOMATED COUNT: 20.1 % (ref 11–15)
HCT VFR BLD AUTO: 24.6 % (ref 39–53)
HGB BLD-MCNC: 8.3 G/DL (ref 13–17.5)
IMM GRANULOCYTES # BLD AUTO: 0.03 X10(3) UL (ref 0–1)
IMM GRANULOCYTES NFR BLD: 0.8 %
LYMPHOCYTES # BLD AUTO: 0.54 X10(3) UL (ref 1–4)
LYMPHOCYTES NFR BLD AUTO: 14.8 %
M PROTEIN MFR SERPL ELPH: 5 G/DL (ref 6.4–8.2)
MCH RBC QN AUTO: 35.8 PG (ref 26–34)
MCHC RBC AUTO-ENTMCNC: 33.7 G/DL (ref 31–37)
MCV RBC AUTO: 106 FL (ref 80–100)
MONOCYTES # BLD AUTO: 0.83 X10(3) UL (ref 0.1–1)
MONOCYTES NFR BLD AUTO: 22.8 %
NEUTROPHILS # BLD AUTO: 2.21 X10 (3) UL (ref 1.5–7.7)
NEUTROPHILS # BLD AUTO: 2.21 X10(3) UL (ref 1.5–7.7)
NEUTROPHILS NFR BLD AUTO: 60.8 %
PLATELET # BLD AUTO: 89 10(3)UL (ref 150–450)
POTASSIUM SERPL-SCNC: 4.2 MMOL/L (ref 3.5–5.1)
RBC # BLD AUTO: 2.32 X10(6)UL (ref 4.3–5.7)
WBC # BLD AUTO: 3.6 X10(3) UL (ref 4–11)

## 2020-07-24 PROCEDURE — 99232 SBSQ HOSP IP/OBS MODERATE 35: CPT | Performed by: PHYSICIAN ASSISTANT

## 2020-07-24 PROCEDURE — 99233 SBSQ HOSP IP/OBS HIGH 50: CPT | Performed by: HOSPITALIST

## 2020-07-24 NOTE — PROGRESS NOTES
[facility]     Gastroenterology Progress Note    Nohemy Bellamy Patient Status:  Inpatient    1980 MRN [id number]   Location [facility] 5SW/SE Attending Maribell Mora MD   Hosp Day # 6 PCP Mukesh Valdez MD today. # ETOH abuse: appreciate evaluations with Dr. Alphonso Velasquez who patient sees outpatient. Monitor for withdrawal, CIWA protocol, IV thiamine/folate and multivitamin. # Agitation: improved - generally early AM    # Hx of C.  Diff: PCR (+) 7/19 - unclea cessation  -monitor/correct electrolytes  -see above    Case discussed with Dr. Cee Vera, GI on-call and RN Sarkis.     Ho Martin, [facility], [facility]  7/24/2020  Results:     Lab Results   Component Value Date    WBC 3.6 (L) 07/24/2020    HG

## 2020-07-24 NOTE — PLAN OF CARE
Patient resting in bed comfortably. Alert, oriented x 3-4. Can be impulsive at times. Vital signs stable. Afebrile. Denied pain. Scheduled ativan given. IV fluids infusing. P.O vancomycin given. Contact/enteric precautions maintained.  Sepsis BPA MD christopher ordered antiemetic medications  - Provide nonpharmacologic comfort measures as appropriate  - Advance diet as tolerated, if ordered  - Obtain nutritional consult as needed  - Evaluate fluid balance  Outcome: Progressing  Goal: Maintains or returns to basel algorithm/standards of care as needed  Outcome: Progressing     Problem: ANXIETY  Goal: Will report anxiety at manageable levels  Description  INTERVENTIONS:  - Administer medication as ordered  - Teach and rehearse alternative coping skills  - Provide emo Risk - Non-Violent Restraints  Goal: Patient will remain free from self-harm  Description  INTERVENTIONS:  - Apply the least restrictive restraint to prevent harm  - Notify patient and family of reasons restraints applied  - Assess for any contributing fac

## 2020-07-24 NOTE — PROGRESS NOTES
[place] [facility] - [facility]  Hospitalist Progress Note     Miguel Herrera Patient Status:  Inpatient    1980  45year old [facility] [id number]   Location 526/526-A Attending Galina Mo MD   Hosp Day # 6 PCP Wilton Hubbard MD     ASSESSMENT/PLAN BMI~18.3  Very low mag~1.3 -->cont replacement  Alcohol abuse  H/o hereditary hemochromatosis, sees Dr. Milton Baker as outpatient     DVT prophylaxis: SCDs  CODE STATUS is full code  Disposition: Mobilize, continue PT/OT, patient is steadily improving, but stil 141 137  --    K 4.7 4.4 3.5 4.2    110 107  --    CO2 27.0 23.0 20.0*  --    ALKPHO 247* 221* 236* 246*   * 192* 156* 140*   ALT 70* 53 44 44   BILT 17.2* 16.0* 17.2* 16.1*   TP 6.2* 5.2* 5.0* 5.0*     Recent Labs   Lab 07/20/20  1533 07/21/2

## 2020-07-24 NOTE — DIETARY NOTE
ADULT NUTRITION REASSESSMENT     Pt is at moderate nutrition risk. Pt meets moderate malnutrition criteria.       CRITERIA FOR MALNUTRITION DIAGNOSIS:  Criteria for non-severe malnutrition diagnosis: chronic illness related to energy intake less than75% multivitamin/mineral with folic acid and Vit D PTA.   - Feeding assistance: independent    - Nutrition education: discussed need for improved intake and stop of ETOH intake. Pt knows he has a problem and cant control it so has to abstain.   Urged ensure en Eaten (%): pt refused meal at 07/23/20 1835    07/23/20 2029  10 %    07/24/20 1100  25 %             Intake Meeting Needs: No, but supplements to maximize    Food Allergies: No  Cultural/Ethnic/Yarsanism Preferences: Not Obtained    MEDICATIONS: reviewed gain as able, PO and supplement greater than 75% of needs, labs WNL, prevent skin breakdown and improved GI status    DIETITIAN FOLLOW UP: RD to follow.       Aramis Jin Jayden 87, [address] [facility], / Lewis Arana

## 2020-07-24 NOTE — OCCUPATIONAL THERAPY NOTE
OCCUPATIONAL THERAPY TREATMENT NOTE - INPATIENT        Room Number: 526/526-A           Presenting Problem: (weakness;jaundice)    Problem List  Principal Problem:    Ascites due to alcoholic hepatitis  Active Problems:    Alcohol withdrawal with delirium perceptual training;Functional transfer training; Endurance training    SUBJECTIVE  Pt seen up in chair and agreeable for OT session     OBJECTIVE  Precautions: Bed/chair alarm    WEIGHT BEARING RESTRICTION  Weight Bearing Restriction: None                P ambulation with RW for support, balance, R neglect  Patient End of Session: Up in chair;Needs met;Call light within reach;RN aware of session/findings; Alarm set    OT Goals:        Patient will complete functional transfer with supervision  Comment: supine

## 2020-07-24 NOTE — PHYSICAL THERAPY NOTE
PHYSICAL THERAPY TREATMENT NOTE - INPATIENT     Room Number: 526/526-A       Presenting Problem: ascites due to alcoholic hepatitis; jaundice; CIWA    Problem List  Principal Problem:    Ascites due to alcoholic hepatitis  Active Problems:    Alcohol withd this level of impairment may benefit from home with HHC. Pt progressing towards PT goals, continues to demonstrate generalized weakness, balance deficits, decreased activity tolerance.  PT recommendation SUB ACUTE REHAB vs HOME WITH 24 HR SUPERVISION and HH ABILITY STATUS  Gait Assessment   Gait Assistance: Minimum assistance(2nd person assist for lines)  Distance (ft): 60' x 1, 15' x 1  Assistive Device: Rolling walker  Pattern: Shuffle; Ataxic(mild instability, R path deviations)  Stoop/Curb Assistance: Not

## 2020-07-24 NOTE — BH PROGRESS NOTE
Behavioral Health Note:  Psych Liaison met with Joana Maurice to discuss engagement in dual dx PHP at [facility] following discharge from medical admission.      Psych Liaison informed Joana Maurice that he would be able to start program Tuesday, 7/28/20 and he report

## 2020-07-24 NOTE — PROGRESS NOTES
Nohemy Bellamy is a 45year old   male with history of alcoholism, depression, osteoarthritis and cyclic vomiting who presented to the hospital complaining of jaundice and found with ascites and acute alcoholic hepatitis.   The patient seen tobacco: Current User        Types: Chew      Tobacco comment: Wife smokes, some passive exposure. Alcohol use:  Yes      Alcohol/week: 2.5 standard drinks      Types: 3 Standard drinks or equivalent per week      Frequency: 4 or more times a week 07/23/2020    GLU 88 07/23/2020    CA 7.6 (L) 07/23/2020    ALB 1.7 (L) 07/23/2020    ALKPHO 236 (H) 07/23/2020    TP 5.0 (L) 07/23/2020     (H) 07/23/2020    ALT 44 07/23/2020    PTT 39.5 (H) 07/20/2020    INR 1.39 (H) 07/22/2020    PTP 16.8 (H) 07 dependence, continuous        The patient with a chronic history of alcohol dependency and history of depression who continues demonstrate deterioration in his cognition, serious medical condition and underlying severe depression and alcoholism.       At t 7/23/2020  Jose Templeton MD

## 2020-07-24 NOTE — PLAN OF CARE
Patient is alert and oriented x4, at times noticed patient to be hallucinating, stating \" there are lot of bugs in this bed\". Reassured patient with truth and patient appeared to be accepting of no begs in bed. VSS.  High fall risk, does not call appropri limits  Description  INTERVENTIONS:  - Monitor labs and rhythm and assess patient for signs and symptoms of electrolyte imbalances  - Administer electrolyte replacement as ordered  - Monitor response to electrolyte replacements, including rhythm and repeat

## 2020-07-25 LAB
ALBUMIN SERPL-MCNC: 1.5 G/DL (ref 3.4–5)
ALBUMIN/GLOB SERPL: 0.5 {RATIO} (ref 1–2)
ALP LIVER SERPL-CCNC: 230 U/L (ref 45–117)
ALT SERPL-CCNC: 37 U/L (ref 16–61)
ANION GAP SERPL CALC-SCNC: 9 MMOL/L (ref 0–18)
AST SERPL-CCNC: 135 U/L (ref 15–37)
BILIRUB SERPL-MCNC: 15.5 MG/DL (ref 0.1–2)
BUN BLD-MCNC: 10 MG/DL (ref 7–18)
BUN/CREAT SERPL: 12.3 (ref 10–20)
CALCIUM BLD-MCNC: 7.8 MG/DL (ref 8.5–10.1)
CHLORIDE SERPL-SCNC: 109 MMOL/L (ref 98–112)
CO2 SERPL-SCNC: 21 MMOL/L (ref 21–32)
CREAT BLD-MCNC: 0.81 MG/DL (ref 0.7–1.3)
GLOBULIN PLAS-MCNC: 3.2 G/DL (ref 2.8–4.4)
GLUCOSE BLD-MCNC: 90 MG/DL (ref 70–99)
M PROTEIN MFR SERPL ELPH: 4.7 G/DL (ref 6.4–8.2)
OSMOLALITY SERPL CALC.SUM OF ELEC: 287 MOSM/KG (ref 275–295)
POTASSIUM SERPL-SCNC: 3.7 MMOL/L (ref 3.5–5.1)
SODIUM SERPL-SCNC: 139 MMOL/L (ref 136–145)

## 2020-07-25 PROCEDURE — 99233 SBSQ HOSP IP/OBS HIGH 50: CPT | Performed by: HOSPITALIST

## 2020-07-25 PROCEDURE — 99232 SBSQ HOSP IP/OBS MODERATE 35: CPT | Performed by: INTERNAL MEDICINE

## 2020-07-25 RX ORDER — POTASSIUM CHLORIDE 20 MEQ/1
40 TABLET, EXTENDED RELEASE ORAL ONCE
Status: COMPLETED | OUTPATIENT
Start: 2020-07-25 | End: 2020-07-25

## 2020-07-25 NOTE — PLAN OF CARE
Problem: Patient/Family Goals  Goal: Patient/Family Long Term Goal  Description  Patient's Long Term Goal: To stay away from alcohol.     Interventions:  - monitor vs, alcohol withdrawal symptoms  -monitor labs, test result  -seizure precautions  - or PO as ordered and tolerated  - Evaluate effectiveness of GI medications  - Encourage mobilization and activity  - Obtain nutritional consult as needed  - Establish a toileting routine/schedule  - Consider collaborating with pharmacy to review patient's concerns and demonstrate effective coping strategies  Description  INTERVENTIONS:  - Assist patient/family to identify coping skills, available support systems and cultural and spiritual values  - Provide emotional support, including active listening and a possible  - Assess the patient's physical comfort, circulation, skin condition, hydration, nutrition and elimination needs   - Reorient and redirection as needed  - Assess for the need to continue restraints  Outcome: Progressing     Patient A&Ox4, with vi

## 2020-07-25 NOTE — PROGRESS NOTES
Patient transferred to 530 to be closer to nurses station. Patient more agitated, stating he wants to go leave, attempted to discontinue iv fluids by unscrewing iv tubing, took tele stickers off, socks off feet.  Discussed with patient the need to stay in h

## 2020-07-25 NOTE — PROGRESS NOTES
[facility]     Gastroenterology Progress Note    Ismael Lugo Patient Status:  Inpatient    1980 MRN [id number]   Location [facility] 5SW/SE Attending Andre Finley MD   [facility] Day # 7 PCP [address] [facility], elevated pro-calcitonin, blood cxs pending  -general diet - encouraged nutrition, perhaps dietician could see the patient again during admission  -trend LFTs  -continue Trental per above  -IV Protonix   -PRN anti-emetics  -[facility] protocol  -appreciate psych 1. 7* 1.7* 1.7* 1.5*    137  --  139   K 4.4 3.5 4.2 3.7    107  --  109   CO2 23.0 20.0*  --  21.0   ALKPHO 221* 236* 246* 230*   * 156* 140* 135*   ALT 53 44 44 37   BILT 16.0* 17.2* 16.1* 15.5*   TP 5.2* 5.0* 5.0* 4.7*       Lab Result

## 2020-07-25 NOTE — PROGRESS NOTES
[place] [facility] - [facility]  Hospitalist Progress Note     Gulshan Tracy Patient Status:  Inpatient    1980  45year old [facility] [id number]   Location 526/526-A Attending Danny Maldonado MD   Hosp Day # 7 PCP Bashir Mckeon MD     ASSESSMENT/PLAN Albumins only 1.7, multiple electrolytes abnormalities, BMI~18.3  Very low mag~1.3 -->cont replacement  Alcohol abuse  H/o hereditary hemochromatosis, sees Dr. Michelle House as outpatient     DVT prophylaxis: SCDs  CODE STATUS is full code  Disposition: Gray Julia --  21.0   ALKPHO 221* 236* 246* 230*   * 156* 140* 135*   ALT 53 44 44 37   BILT 16.0* 17.2* 16.1* 15.5*   TP 5.2* 5.0* 5.0* 4.7*     Recent Labs   Lab 07/20/20  1533 07/21/20  0651 07/22/20  0948   INR 1.42* 1.37* 1.39*   PTT 39.5*  --   --

## 2020-07-26 LAB
ALBUMIN SERPL-MCNC: 1.6 G/DL (ref 3.4–5)
ALBUMIN/GLOB SERPL: 0.5 {RATIO} (ref 1–2)
ALP LIVER SERPL-CCNC: 278 U/L (ref 45–117)
ALT SERPL-CCNC: 45 U/L (ref 16–61)
ANION GAP SERPL CALC-SCNC: 5 MMOL/L (ref 0–18)
AST SERPL-CCNC: 159 U/L (ref 15–37)
BASOPHILS # BLD AUTO: 0.02 X10(3) UL (ref 0–0.2)
BASOPHILS NFR BLD AUTO: 0.5 %
BILIRUB SERPL-MCNC: 17.9 MG/DL (ref 0.1–2)
BUN BLD-MCNC: 7 MG/DL (ref 7–18)
BUN/CREAT SERPL: 8.4 (ref 10–20)
CALCIUM BLD-MCNC: 7.7 MG/DL (ref 8.5–10.1)
CHLORIDE SERPL-SCNC: 109 MMOL/L (ref 98–112)
CO2 SERPL-SCNC: 25 MMOL/L (ref 21–32)
CREAT BLD-MCNC: 0.83 MG/DL (ref 0.7–1.3)
DEPRECATED RDW RBC AUTO: 80.7 FL (ref 35.1–46.3)
EOSINOPHIL # BLD AUTO: 0.01 X10(3) UL (ref 0–0.7)
EOSINOPHIL NFR BLD AUTO: 0.3 %
ERYTHROCYTE [DISTWIDTH] IN BLOOD BY AUTOMATED COUNT: 20.6 % (ref 11–15)
GLOBULIN PLAS-MCNC: 3.3 G/DL (ref 2.8–4.4)
GLUCOSE BLD-MCNC: 83 MG/DL (ref 70–99)
HCT VFR BLD AUTO: 27.6 % (ref 39–53)
HGB BLD-MCNC: 9.2 G/DL (ref 13–17.5)
IMM GRANULOCYTES # BLD AUTO: 0.06 X10(3) UL (ref 0–1)
IMM GRANULOCYTES NFR BLD: 1.6 %
LYMPHOCYTES # BLD AUTO: 0.68 X10(3) UL (ref 1–4)
LYMPHOCYTES NFR BLD AUTO: 17.7 %
M PROTEIN MFR SERPL ELPH: 4.9 G/DL (ref 6.4–8.2)
MCH RBC QN AUTO: 35.4 PG (ref 26–34)
MCHC RBC AUTO-ENTMCNC: 33.3 G/DL (ref 31–37)
MCV RBC AUTO: 106.2 FL (ref 80–100)
MONOCYTES # BLD AUTO: 0.67 X10(3) UL (ref 0.1–1)
MONOCYTES NFR BLD AUTO: 17.4 %
NEUTROPHILS # BLD AUTO: 2.41 X10 (3) UL (ref 1.5–7.7)
NEUTROPHILS # BLD AUTO: 2.41 X10(3) UL (ref 1.5–7.7)
NEUTROPHILS NFR BLD AUTO: 62.5 %
OSMOLALITY SERPL CALC.SUM OF ELEC: 285 MOSM/KG (ref 275–295)
PLATELET # BLD AUTO: 147 10(3)UL (ref 150–450)
POTASSIUM SERPL-SCNC: 3.8 MMOL/L (ref 3.5–5.1)
RBC # BLD AUTO: 2.6 X10(6)UL (ref 4.3–5.7)
SODIUM SERPL-SCNC: 139 MMOL/L (ref 136–145)
WBC # BLD AUTO: 3.9 X10(3) UL (ref 4–11)

## 2020-07-26 PROCEDURE — 99233 SBSQ HOSP IP/OBS HIGH 50: CPT | Performed by: HOSPITALIST

## 2020-07-26 PROCEDURE — 99232 SBSQ HOSP IP/OBS MODERATE 35: CPT | Performed by: INTERNAL MEDICINE

## 2020-07-26 NOTE — PROGRESS NOTES
[facility]     Gastroenterology Progress Note    Jessica Jay Patient Status:  Inpatient    1980 MRN [id number]   Location [facility] 5SW/SE Attending Maddy Butler MD   [facility] Day # 8 PCP Karissa Milian, cessation  -monitor/correct electrolytes  -see above    Subjective:   Resting in bed, again asking when he can go home - discussed his medical condition and need for ongoing medical system.      Objective:     Patient Vitals for the past 24 hrs:   BP Temp T 07/21/2020    INR 1.42 (H) 07/20/2020       No results found. Crystal Nunez  7/26/2020

## 2020-07-26 NOTE — BH PROGRESS NOTE
Chart reviewed, talked with RN, interviewed pt. His RN said that he seems to be getting weaker. He is not eating. He had an unwitnessed fall yesterday, and he was found on the floor in the doorway. Steffi Freshwater visual hallucinations on the night of 7/24. continues to get out of bed, despite redirection). Rec: continue Ativan, other withdrawal sxs. We will follow with you.

## 2020-07-26 NOTE — PLAN OF CARE
Patient  Found on the floor by the entrance. Fall was unwitnessed. The bed alarm was not active. No injury noted, patient denied any pain, denied hitting head. Nurse found patient on the floor by the doorway.  Dr. Wilton Perez was notified, Yoandy Quiroz wife was notif

## 2020-07-26 NOTE — PLAN OF CARE
Problem: Patient/Family Goals  Goal: Patient/Family Long Term Goal  Description  Patient's Long Term Goal: To stay away from alcohol.     Interventions:  - monitor vs, alcohol withdrawal symptoms  -monitor labs, test result  -seizure precautions  - or PO as ordered and tolerated  - Evaluate effectiveness of GI medications  - Encourage mobilization and activity  - Obtain nutritional consult as needed  - Establish a toileting routine/schedule  - Consider collaborating with pharmacy to review patient's concerns and demonstrate effective coping strategies  Description  INTERVENTIONS:  - Assist patient/family to identify coping skills, available support systems and cultural and spiritual values  - Provide emotional support, including active listening and a possible  - Assess the patient's physical comfort, circulation, skin condition, hydration, nutrition and elimination needs   - Reorient and redirection as needed  - Assess for the need to continue restraints  Outcome: Progressing

## 2020-07-26 NOTE — PROGRESS NOTES
[facility]  Hospitalist Progress Note     Virginia Huitron Patient Status:  Inpatient    1980  45year old [facility] [id number]   Location 526/526-A Attending Ward Encarnacion MD   Hosp Day # 8 PCP Candace Ramos MD     ASSESSMENT/PLAN Albumins only 1.7, multiple electrolytes abnormalities, BMI~18.3  Very low mag~1.3 -->cont replacement  Alcohol abuse  H/o hereditary hemochromatosis, sees  Oral Hart as outpatient     DVT prophylaxis: SCDs  CODE STATUS is full code  Disposition: Arnulfo Scott 107  --  109   CO2 23.0 20.0*  --  21.0   ALKPHO 221* 236* 246* 230*   * 156* 140* 135*   ALT 53 44 44 37   BILT 16.0* 17.2* 16.1* 15.5*   TP 5.2* 5.0* 5.0* 4.7*     Recent Labs   Lab 07/20/20  1533 07/21/20  0651 07/22/20  0948   INR 1.42* 1.37* 1.

## 2020-07-27 LAB
ALBUMIN SERPL-MCNC: 1.5 G/DL (ref 3.4–5)
ALBUMIN/GLOB SERPL: 0.5 {RATIO} (ref 1–2)
ALP LIVER SERPL-CCNC: 271 U/L (ref 45–117)
ALT SERPL-CCNC: 41 U/L (ref 16–61)
ANION GAP SERPL CALC-SCNC: 7 MMOL/L (ref 0–18)
AST SERPL-CCNC: 156 U/L (ref 15–37)
BASOPHILS # BLD AUTO: 0.03 X10(3) UL (ref 0–0.2)
BASOPHILS NFR BLD AUTO: 0.8 %
BILIRUB SERPL-MCNC: 18.5 MG/DL (ref 0.1–2)
BUN BLD-MCNC: 11 MG/DL (ref 7–18)
BUN/CREAT SERPL: 11.5 (ref 10–20)
CALCIUM BLD-MCNC: 7.9 MG/DL (ref 8.5–10.1)
CHLORIDE SERPL-SCNC: 107 MMOL/L (ref 98–112)
CO2 SERPL-SCNC: 24 MMOL/L (ref 21–32)
CREAT BLD-MCNC: 0.96 MG/DL (ref 0.7–1.3)
DEPRECATED RDW RBC AUTO: 81.3 FL (ref 35.1–46.3)
EOSINOPHIL # BLD AUTO: 0.02 X10(3) UL (ref 0–0.7)
EOSINOPHIL NFR BLD AUTO: 0.5 %
ERYTHROCYTE [DISTWIDTH] IN BLOOD BY AUTOMATED COUNT: 20.6 % (ref 11–15)
GLOBULIN PLAS-MCNC: 3.3 G/DL (ref 2.8–4.4)
GLUCOSE BLD-MCNC: 88 MG/DL (ref 70–99)
HAV IGM SER QL: 2.3 MG/DL (ref 1.6–2.6)
HCT VFR BLD AUTO: 25.9 % (ref 39–53)
HGB BLD-MCNC: 8.7 G/DL (ref 13–17.5)
IMM GRANULOCYTES # BLD AUTO: 0.09 X10(3) UL (ref 0–1)
IMM GRANULOCYTES NFR BLD: 2.3 %
INR BLD: 1.19 (ref 0.9–1.2)
LYMPHOCYTES # BLD AUTO: 0.56 X10(3) UL (ref 1–4)
LYMPHOCYTES NFR BLD AUTO: 14.2 %
M PROTEIN MFR SERPL ELPH: 4.8 G/DL (ref 6.4–8.2)
MCH RBC QN AUTO: 36.1 PG (ref 26–34)
MCHC RBC AUTO-ENTMCNC: 33.6 G/DL (ref 31–37)
MCV RBC AUTO: 107.5 FL (ref 80–100)
MONOCYTES # BLD AUTO: 0.67 X10(3) UL (ref 0.1–1)
MONOCYTES NFR BLD AUTO: 17 %
NEUTROPHILS # BLD AUTO: 2.57 X10 (3) UL (ref 1.5–7.7)
NEUTROPHILS # BLD AUTO: 2.57 X10(3) UL (ref 1.5–7.7)
NEUTROPHILS NFR BLD AUTO: 65.2 %
OSMOLALITY SERPL CALC.SUM OF ELEC: 285 MOSM/KG (ref 275–295)
PHOSPHATE SERPL-MCNC: 2.5 MG/DL (ref 2.5–4.9)
PLATELET # BLD AUTO: 142 10(3)UL (ref 150–450)
POTASSIUM SERPL-SCNC: 3.8 MMOL/L (ref 3.5–5.1)
PROTHROMBIN TIME: 14.9 SECONDS (ref 11.8–14.5)
RBC # BLD AUTO: 2.41 X10(6)UL (ref 4.3–5.7)
SODIUM SERPL-SCNC: 138 MMOL/L (ref 136–145)
WBC # BLD AUTO: 3.9 X10(3) UL (ref 4–11)

## 2020-07-27 PROCEDURE — 99233 SBSQ HOSP IP/OBS HIGH 50: CPT | Performed by: HOSPITALIST

## 2020-07-27 PROCEDURE — 99233 SBSQ HOSP IP/OBS HIGH 50: CPT | Performed by: OTHER

## 2020-07-27 PROCEDURE — 99232 SBSQ HOSP IP/OBS MODERATE 35: CPT | Performed by: PHYSICIAN ASSISTANT

## 2020-07-27 RX ORDER — PROPRANOLOL HYDROCHLORIDE 20 MG/1
5 TABLET ORAL 3 TIMES DAILY
Status: DISCONTINUED | OUTPATIENT
Start: 2020-07-27 | End: 2020-07-30

## 2020-07-27 RX ORDER — ESCITALOPRAM OXALATE 5 MG/1
5 TABLET ORAL NIGHTLY
Status: DISCONTINUED | OUTPATIENT
Start: 2020-07-27 | End: 2020-07-30

## 2020-07-27 RX ORDER — POTASSIUM CHLORIDE 20 MEQ/1
40 TABLET, EXTENDED RELEASE ORAL ONCE
Status: COMPLETED | OUTPATIENT
Start: 2020-07-27 | End: 2020-07-27

## 2020-07-27 RX ORDER — TEMAZEPAM 7.5 MG/1
15 CAPSULE ORAL NIGHTLY PRN
Status: DISCONTINUED | OUTPATIENT
Start: 2020-07-27 | End: 2020-07-27

## 2020-07-27 RX ORDER — LORAZEPAM 2 MG/ML
0.25 INJECTION INTRAMUSCULAR 3 TIMES DAILY
Status: DISCONTINUED | OUTPATIENT
Start: 2020-07-27 | End: 2020-07-28

## 2020-07-27 RX ORDER — TEMAZEPAM 7.5 MG/1
15 CAPSULE ORAL NIGHTLY
Status: DISCONTINUED | OUTPATIENT
Start: 2020-07-27 | End: 2020-07-30

## 2020-07-27 NOTE — PROGRESS NOTES
[facility]     Gastroenterology Progress Note    Rashi Meehan Patient Status:  Inpatient    1980 MRN [id number]   Location [facility] 5SW/SE Attending Puja Simons MD   Hosp Day # 9 PCP Daniela Ruiz MD Hgb today. # ETOH abuse: appreciate evaluations with Dr. Diana Esquivel who patient sees outpatient. Monitor for withdrawal, CIWA protocol, IV thiamine/folate and multivitamin. # Agitation: improved - generally early AM    # Hx of C.  Diff: PCR (+) 7/19 - un 07/27/2020    CREATSERUM 0.96 07/27/2020    BUN 11 07/27/2020     07/27/2020    K 3.8 07/27/2020     07/27/2020    CO2 24.0 07/27/2020    GLU 88 07/27/2020    CA 7.9 (L) 07/27/2020    ALB 1.5 (L) 07/27/2020    ALKPHO 271 (H) 07/27/2020    BILT

## 2020-07-27 NOTE — CM/SW NOTE
SW self referral for d/c planning and SNF placement. Pt admitted  7/18/2020 for ascites due to alcoholic hepatitis and jaundice.   Per chart review and multidisciplinary rounds, pt lives in a 2 story home with his spouse with 2 external steps and 24 steps

## 2020-07-27 NOTE — PROGRESS NOTES
[facility]  Hospitalist Progress Note     Ronald Teresain Patient Status:  Inpatient    1980  45year old [facility] [id number]   Location 526/526-A Attending Marquis Amita MD   Hosp Day # 9 PCP Eden Toure MD     ASSESSMENT/PLAN Albumins only 1.7, multiple electrolytes abnormalities, BMI~18.3  Very low mag~1.3 -->cont replacement  Alcohol abuse  H/o hereditary hemochromatosis, sees Dr. Edith Cao as outpatient     DVT prophylaxis: SCDs  CODE STATUS is full code  Disposition: Flaquito Dowell 112 111 99   CA 7.8* 7.7* 7.9*   ALB 1.5* 1.6* 1.5*    139 138   K 3.7 3.8 3.8    109 107   CO2 21.0 25.0 24.0   ALKPHO 230* 278* 271*   * 159* 156*   ALT 37 45 41   BILT 15.5* 17.9* 18.5*   TP 4.7* 4.9* 4.8*     Recent Labs   Lab 07/20/

## 2020-07-27 NOTE — CM/SW NOTE
Interdisciplinary Rounds: 07/27/20  Admitted: 7/18/2020 LOS: 9  Disciplines in attendance: charge nurse, staff nurse, CM, [address] and discharge plan reviewed.     Active issues needing resolution prior to discharge:      Alcoholic Hepatitis: initia

## 2020-07-27 NOTE — BH PROGRESS NOTE
Psych Liaison was informed that Karen Merchant has now agreed to go to SNF as initially recommended upon discharge.      Psych Liaison cancelled upcoming dual dx PHP (partial hospitalization program) assessment for 7/30/20 at 0915 at [facility] and reported reaso

## 2020-07-27 NOTE — PLAN OF CARE
Problem: Patient/Family Goals  Goal: Patient/Family Long Term Goal  Description  Patient's Long Term Goal: To stay away from alcohol.     Interventions:  - monitor vs, alcohol withdrawal symptoms  -monitor labs, test result  -seizure precautions  - or PO as ordered and tolerated  - Evaluate effectiveness of GI medications  - Encourage mobilization and activity  - Obtain nutritional consult as needed  - Establish a toileting routine/schedule  - Consider collaborating with pharmacy to review patient's concerns and demonstrate effective coping strategies  Description  INTERVENTIONS:  - Assist patient/family to identify coping skills, available support systems and cultural and spiritual values  - Provide emotional support, including active listening and a possible  - Assess the patient's physical comfort, circulation, skin condition, hydration, nutrition and elimination needs   - Reorient and redirection as needed  - Assess for the need to continue restraints  Outcome: Progressing     Pt is awake alert orie

## 2020-07-27 NOTE — OCCUPATIONAL THERAPY NOTE
OCCUPATIONAL THERAPY TREATMENT NOTE - INPATIENT        Room Number: 030/270-F           Presenting Problem: (weakness;jaundice)    Problem List  Principal Problem:    Ascites due to alcoholic hepatitis  Active Problems:    Alcohol withdrawal with delirium Bearing Restriction: None                PAIN ASSESSMENT  Rating: Unable to rate  Location: (generalized)  Management Techniques:  Activity promotion             ACTIVITIES OF DAILY LIVING ASSESSMENT  AM-PAC ‘6-Clicks’ Inpatient Daily Activity Short Form  H MOT/R  [address]  [id number]

## 2020-07-27 NOTE — PAYOR COMM NOTE
--------------  CONTINUED STAY REVIEW    Payor: JOHN GARCÍA  Subscriber #:  [id number]  Authorization Number: [id number]    Admit date: 7/18/20  Admit time: [address] 7/24/20- 7/26/20 7/24/20   ASSESSMENT/PLAN   Acute alcoholic h 07/21/20  1151 07/22/20  0657 07/23/20  0657 07/24/20  0704   GLU 84 71 88  --    BUN 4* 4* 8  --    CREATSERUM 0.70 0.75 0.83  --    GFRAA 137 134 128  --    GFRNAA 119 116 111  --    CA 8.7 7.7* 7.6*  --    ALB 2.2* 1.7* 1.7* 1.7*    141 137  -- medications     OBJECTIVE  Temp:  [97.8 °F (36.6 °C)-99.3 °F (37.4 °C)] 98.4 °F (36.9 °C)  Pulse:  [72-88] 72  Resp:  [18] 18  BP: (104-110)/(66-73) 104/66  Gen: A+O.  No distress.  Resting in bed   HEENT: NCAT, neck supple, no carotid bruit.  Scleral icter SCDs  CODE STATUS is full code     SUBJECTIVE  No recurrent fevers   No cough, no abdominal pain.   still very jaundiced  Oral intake low  Had a rough night, wanted to go home, unsteady, compulsive, fell the last evening, no injury     OBJECTIVE  Temp:  Gerain.Lm Given 7.5 mg Oral       Montelukast Sodium (SINGULAIR) tab 10 mg     Date Action Dose Route     7/26/2020 2200 Given 10 mg Oral       Pantoprazole Sodium (PROTONIX) EC tab 40 mg     Date Action Dose Route     7/27/2020 0700 Given 40 mg Oral       Pentoxify

## 2020-07-27 NOTE — PHYSICAL THERAPY NOTE
PHYSICAL THERAPY TREATMENT NOTE - INPATIENT     Room Number: 958/156-F       Presenting Problem: ascites due to alcoholic hepatitis; jaundice; CIWA    Problem List  Principal Problem:    Ascites due to alcoholic hepatitis  Active Problems:    Alcohol withd to do\"    OBJECTIVE  Precautions: Bed/chair alarm    WEIGHT BEARING RESTRICTION  Weight Bearing Restriction: None                PAIN ASSESSMENT   Ratin  Location: neck  Management Techniques: Activity promotion; Body mechanics(AROM)    BALANCE assistance level: independent with none      Goal #2  Current Status CGA to min A x 1    Goal #3 Patient is able to ambulate 300 feet with assist device: none at assistance level: independent   Goal #3   Current Status 80ft with rolling walker with min A x

## 2020-07-28 ENCOUNTER — APPOINTMENT (OUTPATIENT)
Dept: ULTRASOUND IMAGING | Facility: HOSPITAL | Age: 40
DRG: 433 | End: 2020-07-28
Attending: PHYSICIAN ASSISTANT
Payer: COMMERCIAL

## 2020-07-28 LAB
ALBUMIN FLD-MCNC: 0.3 G/DL
ALBUMIN SERPL-MCNC: 1.6 G/DL (ref 3.4–5)
ALBUMIN/GLOB SERPL: 0.5 {RATIO} (ref 1–2)
ALP LIVER SERPL-CCNC: 310 U/L (ref 45–117)
ALT SERPL-CCNC: 46 U/L (ref 16–61)
ANION GAP SERPL CALC-SCNC: 9 MMOL/L (ref 0–18)
AST SERPL-CCNC: 174 U/L (ref 15–37)
BASOPHILS # BLD: 0 X10(3) UL (ref 0–0.2)
BASOPHILS NFR BLD: 0 %
BASOPHILS NFR FLD: 0 %
BILIRUB SERPL-MCNC: 21.1 MG/DL (ref 0.1–2)
BUN BLD-MCNC: 10 MG/DL (ref 7–18)
BUN/CREAT SERPL: 9.1 (ref 10–20)
CALCIUM BLD-MCNC: 8.3 MG/DL (ref 8.5–10.1)
CHLORIDE SERPL-SCNC: 108 MMOL/L (ref 98–112)
CO2 SERPL-SCNC: 22 MMOL/L (ref 21–32)
COLOR FLD: YELLOW
CREAT BLD-MCNC: 1.1 MG/DL (ref 0.7–1.3)
DEPRECATED RDW RBC AUTO: 82.1 FL (ref 35.1–46.3)
EOSINOPHIL # BLD: 0 X10(3) UL (ref 0–0.7)
EOSINOPHIL NFR BLD: 0 %
EOSINOPHIL NFR FLD: 0 %
ERYTHROCYTE [DISTWIDTH] IN BLOOD BY AUTOMATED COUNT: 20.5 % (ref 11–15)
GLOBULIN PLAS-MCNC: 3.5 G/DL (ref 2.8–4.4)
GLUCOSE BLD-MCNC: 95 MG/DL (ref 70–99)
HCT VFR BLD AUTO: 28.3 % (ref 39–53)
HGB BLD-MCNC: 9.5 G/DL (ref 13–17.5)
INR BLD: 1.25 (ref 0.9–1.2)
LYMPHOCYTES NFR BLD: 0.59 X10(3) UL (ref 1–4)
LYMPHOCYTES NFR BLD: 10 %
LYMPHOCYTES NFR FLD: 15 %
M PROTEIN MFR SERPL ELPH: 5.1 G/DL (ref 6.4–8.2)
MCH RBC QN AUTO: 36.5 PG (ref 26–34)
MCHC RBC AUTO-ENTMCNC: 33.6 G/DL (ref 31–37)
MCV RBC AUTO: 108.8 FL (ref 80–100)
MONOCYTES # BLD: 0.59 X10(3) UL (ref 0.1–1)
MONOCYTES NFR BLD: 10 %
MONOCYTES NFR FLD: 81 %
MYELOCYTES # BLD: 0.12 X10(3) UL
MYELOCYTES NFR BLD: 2 %
NEUTROPHILS # BLD AUTO: 3.91 X10 (3) UL (ref 1.5–7.7)
NEUTROPHILS NFR BLD: 73 %
NEUTROPHILS NFR FLD: 3 %
NEUTS BAND NFR BLD: 5 %
NEUTS HYPERSEG # BLD: 4.6 X10(3) UL (ref 1.5–7.7)
OSMOLALITY SERPL CALC.SUM OF ELEC: 287 MOSM/KG (ref 275–295)
PLATELET # BLD AUTO: 180 10(3)UL (ref 150–450)
PLATELET MORPHOLOGY: NORMAL
POTASSIUM SERPL-SCNC: 3.5 MMOL/L (ref 3.5–5.1)
POTASSIUM SERPL-SCNC: 4 MMOL/L (ref 3.5–5.1)
POTASSIUM SERPL-SCNC: 4 MMOL/L (ref 3.5–5.1)
PROT FLD-MCNC: <1 G/DL
PROTHROMBIN TIME: 15.5 SECONDS (ref 11.8–14.5)
RBC # BLD AUTO: 2.6 X10(6)UL (ref 4.3–5.7)
SODIUM SERPL-SCNC: 139 MMOL/L (ref 136–145)
TOTAL CELLS COUNTED: 100
TURBIDITY CSF QL: CLEAR
WBC # BLD AUTO: 5.9 X10(3) UL (ref 4–11)
WBC # FLD: 12 /CUMM (ref ?–1)
WBC # FLD: 152 /CUMM (ref ?–1)
WBC OTHER NFR FLD: 1 %

## 2020-07-28 PROCEDURE — 99233 SBSQ HOSP IP/OBS HIGH 50: CPT | Performed by: HOSPITALIST

## 2020-07-28 PROCEDURE — 99233 SBSQ HOSP IP/OBS HIGH 50: CPT | Performed by: PHYSICIAN ASSISTANT

## 2020-07-28 PROCEDURE — 0W9G30Z DRAINAGE OF PERITONEAL CAVITY WITH DRAINAGE DEVICE, PERCUTANEOUS APPROACH: ICD-10-PCS | Performed by: CLINICAL NURSE SPECIALIST

## 2020-07-28 PROCEDURE — 99232 SBSQ HOSP IP/OBS MODERATE 35: CPT | Performed by: OTHER

## 2020-07-28 PROCEDURE — 49083 ABD PARACENTESIS W/IMAGING: CPT | Performed by: PHYSICIAN ASSISTANT

## 2020-07-28 RX ORDER — POTASSIUM CHLORIDE 20 MEQ/1
40 TABLET, EXTENDED RELEASE ORAL EVERY 4 HOURS
Status: COMPLETED | OUTPATIENT
Start: 2020-07-28 | End: 2020-07-28

## 2020-07-28 RX ORDER — LORAZEPAM 2 MG/ML
0.25 INJECTION INTRAMUSCULAR EVERY 4 HOURS PRN
Status: DISCONTINUED | OUTPATIENT
Start: 2020-07-28 | End: 2020-07-30

## 2020-07-28 NOTE — CM/SW NOTE
Addendum 1046:  Per wife vm, she is agreeable to [facility]. Paulo Spatz made in [address]. Care team informed via computer chat message. SW uploaded updates via [employer], and requested insurance authorization.       Plan:  DC to [facility] pending insurance

## 2020-07-28 NOTE — PROGRESS NOTES
Pricila Rosenthal is a 45year old   male with history of alcoholism, depression, osteoarthritis and cyclic vomiting who presented to the hospital complaining of jaundice and found with ascites and acute alcoholic hepatitis.   The patient seen 2012      Family History   Problem Relation Age of Onset   • Cataracts Mother    • Cancer Other 68        maternal great uncle; unknown etiology   • Bleeding Disorders Neg    • Clotting Disorder Neg    • Anemia Neg       Social History: Social History    T Component Value Date    WBC 3.9 (L) 07/27/2020    HGB 8.7 (L) 07/27/2020    HCT 25.9 (L) 07/27/2020    .0 (L) 07/27/2020    CREATSERUM 0.96 07/27/2020    BUN 11 07/27/2020     07/27/2020    K 3.8 07/27/2020     07/27/2020    CO2 24.0 0 dependency and history of depression who failed to lower or discontinue alcohol on his own and came with severe alcoholic hepatitis and more disturbance in his liver function.   Patient had delirium episode resolved today.     At this point, I would recomme (PT)      CBC With Differential With Platelet      Comp Metabolic Panel (14)      Potassium      Albumin, fluid Once      Protein, Body Fluid Once      Cell Count, peritoneal fluid Once      Rapid SARS-CoV-2 by PCR STAT      Clostridium difficile(toxigenic

## 2020-07-28 NOTE — PAYOR COMM NOTE
--------------  CONTINUED STAY REVIEW    Payor: JOHN PPO  Subscriber #:  [id number]  Authorization Number: [id number]    Admit date: 7/18/20  Admit time: 1958    Admitting Physician:   Attending Physician:  Darby Bautista MD  Primary Care Physician 7/28/2020 1233 Given 1 tablet Oral Janette Briggs RN      temazepam (RESTORIL) cap 15 mg     Date Action Dose Route User    7/27/2020 2238 Given 15 mg Oral Cecilia Kitchen RN      vancomycin HCl (VANCOCIN) cap 125 mg     Date Action Dose Route

## 2020-07-28 NOTE — IMAGING NOTE
0892 Pt to ultrasound room scouts taken by [facility] Hx taken procedure explained questions answered     [phone] Patient consented. Pt to get albumin 25% 25 grams  no     0909 RICHELLE BOND  Here scanning completed .   PLATELETS = 320.0

## 2020-07-28 NOTE — PLAN OF CARE
Problem: Patient/Family Goals  Goal: Patient/Family Long Term Goal  Description  Patient's Long Term Goal: To stay away from alcohol.     Interventions:  - monitor vs, alcohol withdrawal symptoms  -monitor labs, test result  -seizure precautions  - or PO as ordered and tolerated  - Evaluate effectiveness of GI medications  - Encourage mobilization and activity  - Obtain nutritional consult as needed  - Establish a toileting routine/schedule  - Consider collaborating with pharmacy to review patient's concerns and demonstrate effective coping strategies  Description  INTERVENTIONS:  - Assist patient/family to identify coping skills, available support systems and cultural and spiritual values  - Provide emotional support, including active listening and a possible  - Assess the patient's physical comfort, circulation, skin condition, hydration, nutrition and elimination needs   - Reorient and redirection as needed  - Assess for the need to continue restraints  Outcome: Progressing     Pt VSS.  Denies any avtar

## 2020-07-28 NOTE — PROGRESS NOTES
[facility]     Gastroenterology Progress Note    Virginia Tyleraime Patient Status:  Inpatient    1980 MRN [id number]   Location [facility] 5SW/SE Attending Ward Encarnacion MD   Hosp Day # 10 PCP Candace Ramos MD suppression d/t ETOH abuse. # Anemia: monitor Hgb closely - no known GI blood loss. Likely 2/2 chronic disease and liver disease. Stable Hgb today. # ETOH abuse: appreciate evaluations with Dr. Dayne Lisa who patient sees outpatient.  Monitor for withdra 07/27/2020    CO2 24.0 07/27/2020    GLU 88 07/27/2020    CA 7.9 (L) 07/27/2020    ALB 1.5 (L) 07/27/2020    ALKPHO 271 (H) 07/27/2020    BILT 18.5 (H) 07/27/2020    TP 4.8 (L) 07/27/2020     (H) 07/27/2020    ALT 41 07/27/2020    PTT 39.5 (H) 07/20

## 2020-07-28 NOTE — PROGRESS NOTES
[facility]  Hospitalist Progress Note     Jim [place] Patient Status:  Inpatient    1980  45year old CSN [id number]   Location 526/526-A Attending Lulú Olivarez MD   Hosp Day # 10 PCP Deanna Jaffe MD     ASSESSMENT/PLAN mild  Protein calories malnutrition, suspect from years of ETOH     Albumins only 1.7, multiple electrolytes abnormalities, BMI~18.3  Very low mag~1.3 -->cont replacement  Alcohol abuse  H/o hereditary hemochromatosis, sees Dr. Pete Fernandez as outpatient     DVT 07/28/20  1358   GLU 83 88  --  95   BUN 7 11  --  10   CREATSERUM 0.83 0.96  --  1.10   GFRAA 128 115  --  97   GFRNAA 111 99  --  84   CA 7.7* 7.9*  --  8.3*   ALB 1.6* 1.5*  --  1.6*    138  --  139   K 3.8 3.8 3.5 4.0  4.0    107  --  108

## 2020-07-29 LAB
ALBUMIN SERPL-MCNC: 1.5 G/DL (ref 3.4–5)
ALP LIVER SERPL-CCNC: 321 U/L (ref 45–117)
ALT SERPL-CCNC: 51 U/L (ref 16–61)
AST SERPL-CCNC: 163 U/L (ref 15–37)
BASOPHILS # BLD: 0 X10(3) UL (ref 0–0.2)
BASOPHILS NFR BLD: 0 %
BILIRUB DIRECT SERPL-MCNC: 17.7 MG/DL (ref 0–0.2)
BILIRUB SERPL-MCNC: 20.9 MG/DL (ref 0.1–2)
DEPRECATED RDW RBC AUTO: 81.6 FL (ref 35.1–46.3)
EOSINOPHIL # BLD: 0 X10(3) UL (ref 0–0.7)
EOSINOPHIL NFR BLD: 0 %
ERYTHROCYTE [DISTWIDTH] IN BLOOD BY AUTOMATED COUNT: 20.1 % (ref 11–15)
HCT VFR BLD AUTO: 30.5 % (ref 39–53)
HGB BLD-MCNC: 9.9 G/DL (ref 13–17.5)
LYMPHOCYTES NFR BLD: 0.38 X10(3) UL (ref 1–4)
LYMPHOCYTES NFR BLD: 5 %
M PROTEIN MFR SERPL ELPH: 5.2 G/DL (ref 6.4–8.2)
MCH RBC QN AUTO: 35.5 PG (ref 26–34)
MCHC RBC AUTO-ENTMCNC: 32.5 G/DL (ref 31–37)
MCV RBC AUTO: 109.3 FL (ref 80–100)
MONOCYTES # BLD: 0.15 X10(3) UL (ref 0.1–1)
MONOCYTES NFR BLD: 2 %
NEUTROPHILS # BLD AUTO: 5.97 X10 (3) UL (ref 1.5–7.7)
NEUTROPHILS NFR BLD: 87 %
NEUTS BAND NFR BLD: 6 %
NEUTS HYPERSEG # BLD: 6.98 X10(3) UL (ref 1.5–7.7)
PLATELET # BLD AUTO: 195 10(3)UL (ref 150–450)
PLATELET MORPHOLOGY: NORMAL
RBC # BLD AUTO: 2.79 X10(6)UL (ref 4.3–5.7)
TOTAL CELLS COUNTED: 100
WBC # BLD AUTO: 7.5 X10(3) UL (ref 4–11)

## 2020-07-29 PROCEDURE — 99232 SBSQ HOSP IP/OBS MODERATE 35: CPT | Performed by: OTHER

## 2020-07-29 PROCEDURE — 99232 SBSQ HOSP IP/OBS MODERATE 35: CPT | Performed by: PHYSICIAN ASSISTANT

## 2020-07-29 PROCEDURE — 99232 SBSQ HOSP IP/OBS MODERATE 35: CPT | Performed by: NURSE PRACTITIONER

## 2020-07-29 RX ORDER — MELATONIN
100 DAILY
Status: DISCONTINUED | OUTPATIENT
Start: 2020-07-29 | End: 2020-07-30

## 2020-07-29 NOTE — PLAN OF CARE
Problem: Patient/Family Goals  Goal: Patient/Family Long Term Goal  Description  Patient's Long Term Goal: To stay away from alcohol.     Interventions:  - monitor vs, alcohol withdrawal symptoms  -monitor labs, test result  -seizure precautions  - or PO as ordered and tolerated  - Evaluate effectiveness of GI medications  - Encourage mobilization and activity  - Obtain nutritional consult as needed  - Establish a toileting routine/schedule  - Consider collaborating with pharmacy to review patient's concerns and demonstrate effective coping strategies  Description  INTERVENTIONS:  - Assist patient/family to identify coping skills, available support systems and cultural and spiritual values  - Provide emotional support, including active listening and a possible  - Assess the patient's physical comfort, circulation, skin condition, hydration, nutrition and elimination needs   - Reorient and redirection as needed  - Assess for the need to continue restraints  Outcome: Karrie Mi is more alert a

## 2020-07-29 NOTE — PROGRESS NOTES
[facility]     Gastroenterology Progress Note    Nohemy Bellamy Patient Status:  Inpatient    1980 MRN [id number]   Location [facility] 5SW/SE Attending Maribell Mora MD   Hosp Day # 11 PCP Mukesh Valdez MD chronic disease and liver disease. Stable Hgb today. # ETOH abuse: appreciate evaluations with Dr. Francisco Chin who patient sees outpatient. Monitor for withdrawal, CIWA protocol, IV thiamine/folate and multivitamin.     # Agitation: improved - generally anneliese 20.9 (H) 07/29/2020    TP 5.2 (L) 07/29/2020     (H) 07/29/2020    ALT 51 07/29/2020    PTT 39.5 (H) 07/20/2020    INR 1.25 (H) 07/28/2020    T4F 1.2 04/10/2019    TSH 1.180 04/21/2020     01/21/2020    DDIMER 0.75 (H) 01/21/2020    MG 2.3 07

## 2020-07-29 NOTE — PROGRESS NOTES
[place] [facility]  Hospitalist Progress Note     Vale Prior Patient Status:  Inpatient    1980  45year old [facility] [id number]   Location 526/526-A Attending Jie Garcia MD   Hosp Day # 11 PCP Ernesto Mae MD     ASSESSMENT/PL Nutritional  - Replace per protocol     Other problems  Pancytopenia  Coagulopathy  Hyponatremia, mild  Protein calories malnutrition, suspect from years of ETOH     Albumins only 1.7, multiple electrolytes abnormalities, BMI~18.3  Very low mag~1.3 -->cont 07/28/20  1358 07/29/20  0653   GLU 83 88  --  95  --    BUN 7 11  --  10  --    CREATSERUM 0.83 0.96  --  1.10  --    GFRAA 128 115  --  97  --    GFRNAA 111 99  --  84  --    CA 7.7* 7.9*  --  8.3*  --    ALB 1.6* 1.5*  --  1.6* 1.5*    138  --  13

## 2020-07-29 NOTE — DIETARY NOTE
ADULT NUTRITION REASSESSMENT     Pt is at moderate nutrition risk. Pt meets moderate malnutrition criteria.       CRITERIA FOR MALNUTRITION DIAGNOSIS:  Criteria for non-severe malnutrition diagnosis: chronic illness related to energy intake less than75% Encouraged pt to increase po intake with ONS to maximize po intake. Request RN to obtain new wt. Will add Thiamin due to alcoholism. Multivitamin in place.        NUTRITION INTERVENTION:  - RD Malnutrition Care Plan: Encouraged increased PO intake and Initi 68.5 kg (151 lb)  10/09/19 : 68.5 kg (151 lb)    GASTROINTESTINAL: pt reports pooling of post nasal drip/saliva that he has to get up, so not true vomiting.   Hx of constipation, but recent diarrhea (c diff) Diarrhea resolved +BM 7/29/2020- soft-brown-small mild in certain areas. - Fluid Accumulation: Bilateral LE +1   - Skin Integrity: intact.     NUTRITION PRESCRIPTION:  Diet: Regular/General  Oral Supplements: as above  Estimated Nutritional Needs:  Calories: 6914-7809 calories/day (30-35 calories per kg

## 2020-07-29 NOTE — PLAN OF CARE
Problem: Patient/Family Goals  Goal: Patient/Family Long Term Goal  Description  Patient's Long Term Goal: To stay away from alcohol.     Interventions:  - monitor vs, alcohol withdrawal symptoms  -monitor labs, test result  -seizure precautions  - or PO as ordered and tolerated  - Evaluate effectiveness of GI medications  - Encourage mobilization and activity  - Obtain nutritional consult as needed  - Establish a toileting routine/schedule  - Consider collaborating with pharmacy to review patient's concerns and demonstrate effective coping strategies  Description  INTERVENTIONS:  - Assist patient/family to identify coping skills, available support systems and cultural and spiritual values  - Provide emotional support, including active listening and a possible  - Assess the patient's physical comfort, circulation, skin condition, hydration, nutrition and elimination needs   - Reorient and redirection as needed  - Assess for the need to continue restraints  Outcome: Progressing     Patient alert and orie

## 2020-07-29 NOTE — CM/SW NOTE
Addendum 7/296 @104:   Pt insurance authorization is approved thru August 3,2020. Care team informed. Per Rosaline Quiñones @ [facility] Kailyn:  Pt cannot have IV Ativan. Per RN pt last dose was 9am on 7/28. SW uploaded clinicals via [facility] EHR.  As of this documentatio

## 2020-07-29 NOTE — PAYOR COMM NOTE
--------------  CONTINUED STAY REVIEW    Payor: [facility] [employer]  Subscriber #:  [id number]  Authorization Number: [id number]    Admit date: 7/18/20  Admit time: 1958    Admitting Physician:   Attending Physician:  Audi Palacios MD  Primary Care Physician: multivitamin (ADULT) tab 1 tablet     Date Action Dose Route User    7/29/2020 5904 Given 1 tablet Oral Janette Briggs, RN      temazepam (RESTORIL) cap 15 mg     Date Action Dose Route User    7/28/2020 2116 Given 15 mg Oral Ellis Ga, CHRISTOPHER      va

## 2020-07-29 NOTE — PROGRESS NOTES
Carl Villa is a 45year old   male with history of alcoholism, depression, osteoarthritis and cyclic vomiting who presented to the hospital complaining of jaundice and found with ascites and acute alcoholic hepatitis.   The patient seen comment: Wife smokes, some passive exposure. Alcohol use:  Yes      Alcohol/week: 2.5 standard drinks      Types: 3 Standard drinks or equivalent per week      Frequency: 4 or more times a week      Drinks per session: 3 or 4      Binge frequency: Ruiz Conway (H) 07/28/2020    PTP 15.5 (H) 07/28/2020    T4F 1.2 04/10/2019    TSH 1.180 04/21/2020     01/21/2020    DDIMER 0.75 (H) 01/21/2020    MG 2.3 07/27/2020    PHOS 2.5 07/27/2020    TROP <0.045 01/21/2020    B12 1,220 (H) 01/04/2020    ETOH 88 (H) 07/ (14)      Prothrombin Time (PT)      Ammonia, Plasma      Ethyl Alcohol      CBC With Differential With Platelet      Comp Metabolic Panel (14)      Prothrombin Time (PT)      Potassium      Scan slide      Comp Metabolic Panel (14)      Potassium      Com

## 2020-07-29 NOTE — PAYOR COMM NOTE
--------------  CONTINUED STAY REVIEW    Payor: [facility]  Subscriber #:  [id number]  Authorization Number: [id number]    Admit date: 7/18/20  Admit time: 1958    Admitting Physician:   Attending Physician:  Molina Moncada MD  Primary Care Physician: obstruction  - Outpatient follow-up     Hypokalemia.  Nutritional  - Replace per protocol     Other problems  Pancytopenia  Coagulopathy  Hyponatremia, mild  Protein calories malnutrition, suspect from years of ETOH     Albumins only 1.7, multiple electrol Recent Labs   Lab 07/26/20  1706 07/27/20  0635 07/28/20  0641 07/28/20  1358 07/29/20  0653   GLU 83 88  --  95  --    BUN 7 11  --  10  --    CREATSERUM 0.83 0.96  --  1.10  --    GFRAA 128 115  --  97  --    GFRNAA 111 99  --  84  --    CA 7.7* 7.9* mg Oral Rebel CHRISTOPHER Tinsley    7/28/2020 1844 Given 40 mg Oral TecJanette bennett RN      Propranolol HCl (INDERAL) tab 5 mg     Date Action Dose Route User    7/29/2020 0909 Given 5 mg Oral Rebel CHRISTOPHER Tinsley    7/28/2020 2117 Given 5 mg Oral Mordecai Clonts,

## 2020-07-30 ENCOUNTER — TELEPHONE (OUTPATIENT)
Dept: GASTROENTEROLOGY | Facility: CLINIC | Age: 40
End: 2020-07-30

## 2020-07-30 VITALS
OXYGEN SATURATION: 93 % | HEIGHT: 72 IN | BODY MASS INDEX: 18.28 KG/M2 | WEIGHT: 135 LBS | HEART RATE: 81 BPM | DIASTOLIC BLOOD PRESSURE: 75 MMHG | TEMPERATURE: 98 F | RESPIRATION RATE: 18 BRPM | SYSTOLIC BLOOD PRESSURE: 113 MMHG

## 2020-07-30 PROBLEM — E46 MALNUTRITION (HCC): Status: ACTIVE | Noted: 2020-07-30

## 2020-07-30 LAB
ALBUMIN SERPL-MCNC: 1.4 G/DL (ref 3.4–5)
ALP LIVER SERPL-CCNC: 285 U/L (ref 45–117)
ALT SERPL-CCNC: 49 U/L (ref 16–61)
AST SERPL-CCNC: 148 U/L (ref 15–37)
BASOPHILS # BLD: 0 X10(3) UL (ref 0–0.2)
BASOPHILS NFR BLD: 0 %
BILIRUB DIRECT SERPL-MCNC: 13.4 MG/DL (ref 0–0.2)
BILIRUB SERPL-MCNC: 16.3 MG/DL (ref 0.1–2)
DEPRECATED RDW RBC AUTO: 84.3 FL (ref 35.1–46.3)
EOSINOPHIL # BLD: 0 X10(3) UL (ref 0–0.7)
EOSINOPHIL NFR BLD: 0 %
ERYTHROCYTE [DISTWIDTH] IN BLOOD BY AUTOMATED COUNT: 20.6 % (ref 11–15)
HCT VFR BLD AUTO: 28.4 % (ref 39–53)
HGB BLD-MCNC: 9.3 G/DL (ref 13–17.5)
LYMPHOCYTES NFR BLD: 1.13 X10(3) UL (ref 1–4)
LYMPHOCYTES NFR BLD: 10 %
M PROTEIN MFR SERPL ELPH: 4.9 G/DL (ref 6.4–8.2)
MCH RBC QN AUTO: 36.5 PG (ref 26–34)
MCHC RBC AUTO-ENTMCNC: 32.7 G/DL (ref 31–37)
MCV RBC AUTO: 111.4 FL (ref 80–100)
METAMYELOCYTES # BLD: 0.11 X10(3) UL
METAMYELOCYTES NFR BLD: 1 %
MONOCYTES # BLD: 1.25 X10(3) UL (ref 0.1–1)
MONOCYTES NFR BLD: 11 %
NEUTROPHILS # BLD AUTO: 8.08 X10 (3) UL (ref 1.5–7.7)
NEUTROPHILS NFR BLD: 72 %
NEUTS BAND NFR BLD: 5 %
NEUTS HYPERSEG # BLD: 8.51 X10(3) UL (ref 1.5–7.7)
PLATELET # BLD AUTO: 228 10(3)UL (ref 150–450)
PLATELET MORPHOLOGY: NORMAL
RBC # BLD AUTO: 2.55 X10(6)UL (ref 4.3–5.7)
TOTAL CELLS COUNTED: 97
WBC # BLD AUTO: 11 X10(3) UL (ref 4–11)

## 2020-07-30 PROCEDURE — 99239 HOSP IP/OBS DSCHRG MGMT >30: CPT | Performed by: HOSPITALIST

## 2020-07-30 PROCEDURE — 99232 SBSQ HOSP IP/OBS MODERATE 35: CPT | Performed by: INTERNAL MEDICINE

## 2020-07-30 RX ORDER — ESCITALOPRAM OXALATE 10 MG/1
10 TABLET ORAL NIGHTLY
Status: DISCONTINUED | OUTPATIENT
Start: 2020-07-30 | End: 2020-07-30

## 2020-07-30 RX ORDER — PROPRANOLOL HYDROCHLORIDE 10 MG/1
5 TABLET ORAL 3 TIMES DAILY
Qty: 90 TABLET | Refills: 0 | Status: SHIPPED | OUTPATIENT
Start: 2020-07-30 | End: 2020-08-21

## 2020-07-30 RX ORDER — GARLIC EXTRACT 500 MG
1 CAPSULE ORAL 2 TIMES DAILY
Status: DISCONTINUED | OUTPATIENT
Start: 2020-07-30 | End: 2020-07-30

## 2020-07-30 RX ORDER — MELATONIN
100 DAILY
Qty: 30 TABLET | Refills: 0 | Status: SHIPPED | OUTPATIENT
Start: 2020-07-31 | End: 2021-11-15

## 2020-07-30 RX ORDER — VANCOMYCIN HYDROCHLORIDE 125 MG/1
125 CAPSULE ORAL EVERY 6 HOURS
Qty: 28 CAPSULE | Refills: 0 | Status: SHIPPED | OUTPATIENT
Start: 2020-07-30 | End: 2020-08-06

## 2020-07-30 RX ORDER — ESCITALOPRAM OXALATE 10 MG/1
10 TABLET ORAL NIGHTLY
Qty: 30 TABLET | Refills: 0 | Status: SHIPPED | OUTPATIENT
Start: 2020-07-30 | End: 2020-09-21 | Stop reason: ALTCHOICE

## 2020-07-30 RX ORDER — GARLIC EXTRACT 500 MG
1 CAPSULE ORAL 2 TIMES DAILY
Qty: 60 CAPSULE | Refills: 0 | Status: SHIPPED | OUTPATIENT
Start: 2020-07-30

## 2020-07-30 RX ORDER — TEMAZEPAM 15 MG/1
15 CAPSULE ORAL NIGHTLY
Qty: 30 CAPSULE | Refills: 0 | Status: SHIPPED | OUTPATIENT
Start: 2020-07-30 | End: 2020-08-18

## 2020-07-30 NOTE — PROGRESS NOTES
Rashi Meehan is a 45year old   male with history of alcoholism, depression, osteoarthritis and cyclic vomiting who presented to the hospital complaining of jaundice and found with ascites and acute alcoholic hepatitis.   The patient seen Types: Chew      Tobacco comment: Wife smokes, some passive exposure. Alcohol use:  Yes      Alcohol/week: 2.5 standard drinks      Types: 3 Standard drinks or equivalent per week      Frequency: 4 or more times a week      Drinks per session: 3 or 4  (H) 07/29/2020    ALT 51 07/29/2020    PTT 39.5 (H) 07/20/2020    INR 1.25 (H) 07/28/2020    PTP 15.5 (H) 07/28/2020    T4F 1.2 04/10/2019    TSH 1.180 04/21/2020     01/21/2020    DDIMER 0.75 (H) 01/21/2020    MG 2.3 07/27/2020    PHOS 2.5 orientation and educated about the need to focus on medical condition. 3.   Discontinue Ativan to 0.25 mg IV 3 times daily as needed. 4.  Continue temazepam 15 mg nightly. 5.  Increase Lexapro to 10 mg nightly.   6.  Communicate with RN and agree to the Manual differential      Hepatic Function Panel (7)      CBC With Differential With Platelet      Rapid SARS-CoV-2 by PCR STAT      Clostridium difficile(toxigenic)PCR Once      Blood Culture FREQ X 2      Body Fluid Cult Aerobic and Anaerobic Once      Me

## 2020-07-30 NOTE — PROGRESS NOTES
[facility]     Gastroenterology Progress Note    Samual Pallas Patient Status:  Inpatient    1980 MRN [id number]   Location [facility] 5SW/SE Attending Charmayne Cranker, MD   [facility] Day # 12 PCP [facility], prior tobacco use who was admitted 7/19 due to fatigue, weakness, poor PO intake found to have alcoholic hepatitis, agitation, c.diff    Seen by the gastroenterology service for elevated liver enzymes.     He was started on prednisolone 7/28 with gradual im

## 2020-07-30 NOTE — PLAN OF CARE
Problem: Patient/Family Goals  Goal: Patient/Family Long Term Goal  Description  Patient's Long Term Goal: To stay away from alcohol.     Interventions:  - monitor vs, alcohol withdrawal symptoms  -monitor labs, test result  -seizure precautions  - or PO as ordered and tolerated  - Evaluate effectiveness of GI medications  - Encourage mobilization and activity  - Obtain nutritional consult as needed  - Establish a toileting routine/schedule  - Consider collaborating with pharmacy to review patient's concerns and demonstrate effective coping strategies  Description  INTERVENTIONS:  - Assist patient/family to identify coping skills, available support systems and cultural and spiritual values  - Provide emotional support, including active listening and a possible  - Assess the patient's physical comfort, circulation, skin condition, hydration, nutrition and elimination needs   - Reorient and redirection as needed  - Assess for the need to continue restraints  Outcome: Progressing     Pt a&o X 3, chanell MORILLO

## 2020-07-30 NOTE — CM/SW NOTE
07/30/20 1100   Discharge disposition   Expected discharge disposition Skilled Nurs   Name of [address]   Discharge transportation 555 Yolanda Chapman MDO for Nela Albright 2 liaison for [facility] notified, can accept at 5

## 2020-07-30 NOTE — DISCHARGE SUMMARY
[facility]    Discharge Summary    Josefina Howard Patient Status:  Inpatient    1980 MRN [id number]   Location [facility] 5SW/SE Attending Ailyn Good MD   Hosp Day # 12 PCP Shayna Morris MD     Date of Admissi rales, rhonchi. Abd: Soft, mildly distended, nontender  Neuro:  He is moving all 4 extremities well.     Skin: Jaundiced  MS: No open wounds, no joint effusions.  No edema  Psych:  calm    History of Present Illness: Per Dr Dolan  45year oldmale with longs recurrent fevers  - Keep off antibiotics for now  - Blood culture negative  - Repeat GB and abdomen imaging if recurrent fevers, if ascites, will consider paracentesis to rule out SBP, at present suspicion is low     Abnormal gallbladder on CT imaging.  De prednisoLONE 5 MG Tabs  Commonly known as:  ORAPRED  Start taking on:  August 15, 2020      Take 7 tablets (35 mg total) by mouth daily for 7 days.    Stop taking on:  August 22, 2020  Quantity:  49 tablet  Refills:  0     prednisoLONE 5 MG Tabs  Commonly on:  July 31, 2020      Take 1 tablet (100 mg total) by mouth daily. Quantity:  30 tablet  Refills:  0     vancomycin HCl 125 MG Caps  Commonly known as:  VANCOCIN      Take 1 capsule (125 mg total) by mouth every 6 (six) hours for 7 days.    Stop taking [phone], [phone] [address] [facility] [address]    Phone:  [phone]   · Acidophilus/Pectin Caps  · escitalopram 10 MG Tabs  · prednisoLONE 5 MG Tabs  · prednisoLONE 5 MG Tabs  · prednisoLONE 5 MG Tabs  · prednisoLONE 5 MG Tabs  · prednisoLON

## 2020-07-30 NOTE — PROGRESS NOTES
Spoke to wife mary regarding dc today. Informed ambulance  at 1700. Dr Maia Goldmann to see pt today prior to dc. Will update wife if there is any changes. 1520[phone] spoke with nurse nava.  Answered

## 2020-07-30 NOTE — TELEPHONE ENCOUNTER
Follow - Up     Melodie Díaz MD routed conversation to Dee Kaye MD; [facility] Clinical Staff 1 minute ago (2:25 PM)      Melodie Díaz MD 1 minute ago (2:25 PM)         Mr. Jacqueline Yenia Alfredo has been in the hospital for c

## 2020-07-31 NOTE — TELEPHONE ENCOUNTER
Thanks all. Please schedule OV for RN hold spot or any available appointment next 2 weeks.   I think Aury Pardo has seen me before in the [address] [place] [address] office, could use one of the RN openings on 8/7/2020??

## 2020-07-31 NOTE — PAYOR COMM NOTE
--------------  DISCHARGE REVIEW    Payor: [employer] GARC[employer]  Subscriber #:  [id number]  Authorization Number: [id number]    Admit date: 7/18/20  Admit time:  1958  Discharge Date: 7/30/2020  5:20 PM     Admitting Physician:   Attending Physician:  Safia Cummings nausea     Cyclical vomiting with nausea, intractability of vomiting not specified     Dehydration     Alcohol withdrawal with delirium (HCC)     SETH (acute kidney injury) ([facility] [address]     Hypophosphatemia     Hypomagnesemia     Hypocalcemia     Ascites due to al developed significant alcohol withdrawal in the last 24 hours. Has agitation, tremor, tachycardia, visual hallucinations, paranoia.   - off Restraints  - occ hallucination, but overall much better (had a rough night)  - Detox protocol  - No IV medications Marianela   [phone]                     Discharge Condition: Stable    Discharge Medications:      Discharge Medications      START taking these medications      Instructions Prescription details   Acidophilus/Pectin Caps  Comm 2020      Take 2 tablets (10 mg total) by mouth daily for 7 days.    Stop taking on:  October 1, 2020  Quantity:  14 tablet  Refills:  0     prednisoLONE 5 MG Tabs  Commonly known as:  ORAPRED  Start taking on:  October 2, 2020      Take 1 tablet (5 mg tota Acid Tabs      Take 1 tablet by mouth daily. Refills:  0     Omeprazole 40 MG Cpdr      Take 40 mg by mouth daily. Refills:  0     Vitamin D3 25 MCG (1000 UT) Caps      Take 1 tablet by mouth daily.    Refills:  0        STOP taking these medications

## 2020-08-01 ENCOUNTER — TELEPHONE (OUTPATIENT)
Dept: INTERNAL MEDICINE CLINIC | Facility: CLINIC | Age: 40
End: 2020-08-01

## 2020-08-03 ENCOUNTER — TELEPHONE (OUTPATIENT)
Dept: ADMINISTRATIVE | Age: 40
End: 2020-08-03

## 2020-08-03 RX ORDER — VANCOMYCIN HYDROCHLORIDE 125 MG/1
CAPSULE ORAL
Qty: 28 CAPSULE | Refills: 0 | OUTPATIENT
Start: 2020-08-03

## 2020-08-03 NOTE — TELEPHONE ENCOUNTER
Patients wife called for information on getting patients disability forms completed. Explained process and sent [facility] for HIPAA and fee.

## 2020-08-04 ENCOUNTER — TELEPHONE (OUTPATIENT)
Dept: GASTROENTEROLOGY | Facility: CLINIC | Age: 40
End: 2020-08-04

## 2020-08-04 NOTE — TELEPHONE ENCOUNTER
Dr Zay Still,    Pt is in [facility] in [facility]. He will be release hopefully by the end of the week but his wife didn't want to chance it so she didn't take the ADO appt     We scheduled a new appt @ [facility].  Date, time and location verified    Future Appointm

## 2020-08-04 NOTE — TELEPHONE ENCOUNTER
[facility] called to speak to RN about case management for pt. Pt was recently hospitalized. Please call.

## 2020-08-06 NOTE — TELEPHONE ENCOUNTER
Left message on both voicemails that I am forwarding to Dr Xiomy Wilkins to see if any testing needed prior to his 8/17 f/u appt. See your 3/31 encounter. Thanks.

## 2020-08-06 NOTE — TELEPHONE ENCOUNTER
Patient’s wife called in stating that the Patient has not had any testing to see if his course of treatment is working or not. Patient needs the liver function order. Wife stated this is very urgent.  Please advise

## 2020-08-06 NOTE — TELEPHONE ENCOUNTER
Spoke to spouse Eugene Hope (have [facility]) and gave Dr Brantley's orders. She states he is had [facility] until Monday. Their phone is [phone]. I spoke to nurse Yesenia, faxed CBC, CMP, INR attention to her at [phone].  They will draw tomorrow and fax res

## 2020-08-06 NOTE — TELEPHONE ENCOUNTER
CMP CBC and INR ordered as a standing order. Please advise wife to have Deliciane Speak come in this week for one blood draw, then return 8/15 or 8/16 for second blood draw before next office visit.

## 2020-08-10 ENCOUNTER — TELEPHONE (OUTPATIENT)
Dept: GASTROENTEROLOGY | Facility: CLINIC | Age: 40
End: 2020-08-10

## 2020-08-10 ENCOUNTER — TELEPHONE (OUTPATIENT)
Dept: SURGERY | Facility: CLINIC | Age: 40
End: 2020-08-10

## 2020-08-10 NOTE — TELEPHONE ENCOUNTER
Thanks Malena Wetzel. Since he is not (yet) a known/established high-volume ascites patient we should get an ultrasound to look around and survey his abdomen first.    If he cannot tolerate the symptoms, only other option is to come into the ED for evaluation.

## 2020-08-10 NOTE — TELEPHONE ENCOUNTER
The recent abdominal ascites has not been that bad. It is a new problem. Should be getting better if he is abstaining from alcohol at the [facility] facility. Paracentesis procedure 7/28/2020 was significant for 1 L of fluid drawn off only.     Labs of 8/

## 2020-08-10 NOTE — TELEPHONE ENCOUNTER
Dr Froylan Zavala,    I placed labs on your desk from [facility] (CBC,CMP and INR)    I spoke to the pt's wife Angelia Sol    She states pt's abdominal girth is up and he is very uncomfortable    He is having difficulty taking in deep breaths    Last paracentesis is whe

## 2020-08-10 NOTE — TELEPHONE ENCOUNTER
I spoke to Marcell's wife Erick Warner and she is aware if Orpha Batters become's too uncomfortable they should proceed to the ER. In the meantime, they will make an appt for the US.

## 2020-08-10 NOTE — TELEPHONE ENCOUNTER
Pts wife states that 9/2/20 was the first time that pt was able to get an appt for U/S. Please call.

## 2020-08-10 NOTE — TELEPHONE ENCOUNTER
Dr Esau Rock,    I spoke to Guanaco Grew    He was discharged from [facility] yesterday    I told him you ordered an [address] and he will try to get that scheduled as soon as he can.  I gave him the number to Central Scheduling He has a doctor's appt this afternoon and PT

## 2020-08-10 NOTE — TELEPHONE ENCOUNTER
I spoke to [address] [place] [address] in [place] and she gave me an appt for this Wednesday at 8 am.    I spoke to [facility] and she accepted the appt.     She will go in the [facility] entrance to  [facility]

## 2020-08-10 NOTE — TELEPHONE ENCOUNTER
Recent labs from [facility]:    8/7/2020:    WBC 10,680, hemoglobin 10.0g, ,000    BUN 15, creatinine 0.86, potassium 3.5   ALT 64 alk phosphatase 190  Albumin 2.0    Protime 10.9 seconds, INR 1.05    8/1/2020:    WBC 9830, hemoglobin

## 2020-08-10 NOTE — TELEPHONE ENCOUNTER
RN called Sherie Aguilar from [place] to clarify her request of HomeHealth. Awaiting call back. RN reviewed chart. He is a patient of Dr Siena Mccray. Note, patient was hospitalized on 7/18-7/30 dx with ascites d/t alcoholic hepatitis.  Discharged fr

## 2020-08-12 ENCOUNTER — TELEPHONE (OUTPATIENT)
Dept: GASTROENTEROLOGY | Facility: CLINIC | Age: 40
End: 2020-08-12

## 2020-08-12 ENCOUNTER — HOSPITAL ENCOUNTER (OUTPATIENT)
Dept: ULTRASOUND IMAGING | Facility: HOSPITAL | Age: 40
Discharge: HOME | End: 2020-08-12
Attending: INTERNAL MEDICINE
Payer: COMMERCIAL

## 2020-08-12 DIAGNOSIS — R14.0 ABDOMINAL DISTENSION: ICD-10-CM

## 2020-08-12 DIAGNOSIS — K70.31 ALCOHOLIC CIRRHOSIS OF LIVER WITH ASCITES (HCC): ICD-10-CM

## 2020-08-12 PROCEDURE — 93976 VASCULAR STUDY: CPT | Performed by: INTERNAL MEDICINE

## 2020-08-12 PROCEDURE — 76705 ECHO EXAM OF ABDOMEN: CPT | Performed by: INTERNAL MEDICINE

## 2020-08-12 NOTE — TELEPHONE ENCOUNTER
I spoke to Luis from [employer].  I answered her questions regarding pt admission and discharge date from the hospital. Pt is requesting STD

## 2020-08-13 ENCOUNTER — TELEPHONE (OUTPATIENT)
Dept: INTERNAL MEDICINE CLINIC | Facility: CLINIC | Age: 40
End: 2020-08-13

## 2020-08-13 ENCOUNTER — NURSE ONLY (OUTPATIENT)
Dept: ALLERGY | Facility: CLINIC | Age: 40
End: 2020-08-13
Payer: COMMERCIAL

## 2020-08-13 ENCOUNTER — OFFICE VISIT (OUTPATIENT)
Dept: ALLERGY | Facility: CLINIC | Age: 40
End: 2020-08-13
Payer: COMMERCIAL

## 2020-08-13 VITALS
OXYGEN SATURATION: 98 % | HEIGHT: 71 IN | WEIGHT: 143 LBS | TEMPERATURE: 97 F | BODY MASS INDEX: 20.02 KG/M2 | SYSTOLIC BLOOD PRESSURE: 127 MMHG | HEART RATE: 82 BPM | DIASTOLIC BLOOD PRESSURE: 89 MMHG | RESPIRATION RATE: 20 BRPM

## 2020-08-13 DIAGNOSIS — J30.89 ENVIRONMENTAL AND SEASONAL ALLERGIES: ICD-10-CM

## 2020-08-13 DIAGNOSIS — J34.2 NASAL SEPTAL DEVIATION: ICD-10-CM

## 2020-08-13 DIAGNOSIS — J30.89 PERENNIAL ALLERGIC RHINITIS: Primary | ICD-10-CM

## 2020-08-13 PROCEDURE — 3008F BODY MASS INDEX DOCD: CPT | Performed by: ALLERGY & IMMUNOLOGY

## 2020-08-13 PROCEDURE — 95117 IMMUNOTHERAPY INJECTIONS: CPT | Performed by: ALLERGY & IMMUNOLOGY

## 2020-08-13 PROCEDURE — 99214 OFFICE O/P EST MOD 30 MIN: CPT | Performed by: ALLERGY & IMMUNOLOGY

## 2020-08-13 PROCEDURE — 3079F DIAST BP 80-89 MM HG: CPT | Performed by: ALLERGY & IMMUNOLOGY

## 2020-08-13 PROCEDURE — 3074F SYST BP LT 130 MM HG: CPT | Performed by: ALLERGY & IMMUNOLOGY

## 2020-08-13 PROCEDURE — 1111F DSCHRG MED/CURRENT MED MERGE: CPT | Performed by: ALLERGY & IMMUNOLOGY

## 2020-08-13 RX ORDER — ORPHENADRINE CITRATE 100 MG/1
TABLET, EXTENDED RELEASE ORAL
COMMUNITY
Start: 2020-05-24 | End: 2021-11-15

## 2020-08-13 RX ORDER — MELOXICAM 15 MG/1
15 TABLET ORAL DAILY
COMMUNITY
Start: 2020-05-17 | End: 2020-08-29

## 2020-08-13 NOTE — TELEPHONE ENCOUNTER
Ramandeep calling to see if the doctor will sign off on the Home Health orders for the pt.   Fax:  [phone]

## 2020-08-13 NOTE — PROGRESS NOTES
Pricila Rosenthal is a 45year old male. HPI:   Patient presents with: Allergies: Pt presents for 9 month f/u for hx of Perennial allergic rhinitis, seasonal and environmental allergies. Pt recently hospitalized.      Patient is a 52-year-old male who pre Former Smoker        Years: 5.00        Types: Cigars        Quit date: 2009        Years since quittin.2      Smokeless tobacco: Current User        Types: Chew      Tobacco comment: Wife smokes, some passive exposure.      Alcohol use: Not Gerhardt Manos times daily. 90 tablet 0   • Omeprazole 40 MG Oral Capsule Delayed Release Take 40 mg by mouth daily. • Levocetirizine Dihydrochloride (XYZAL) 5 MG Oral Tab Take 1 tablet (5 mg total) by mouth nightly.  (Patient taking differently: Take 5 mg by mouth da Neck/Thyroid: neck is supple without adenopathy  Lymphatic: no abnormal cervical, supraclavicular or axillary adenopathy is noted  Respiratory: normal to inspection lungs are clear to auscultation bilaterally normal respiratory effort   Cardiovascular: r and training related to self administration of these medications. Teaching, instruction and sample was provided to the patient by myself. Teaching included  a review of potential adverse side effects as well as potential efficacy.   Patient's questions we

## 2020-08-15 ENCOUNTER — APPOINTMENT (OUTPATIENT)
Dept: LAB | Facility: HOSPITAL | Age: 40
End: 2020-08-15
Attending: INTERNAL MEDICINE
Payer: COMMERCIAL

## 2020-08-15 DIAGNOSIS — R17 JAUNDICE: ICD-10-CM

## 2020-08-15 DIAGNOSIS — K70.31 ALCOHOLIC CIRRHOSIS OF LIVER WITH ASCITES (HCC): ICD-10-CM

## 2020-08-15 LAB
ALBUMIN SERPL-MCNC: 2.3 G/DL (ref 3.4–5)
ALBUMIN/GLOB SERPL: 0.5 {RATIO} (ref 1–2)
ALP LIVER SERPL-CCNC: 168 U/L (ref 45–117)
ALT SERPL-CCNC: 66 U/L (ref 16–61)
ANION GAP SERPL CALC-SCNC: 7 MMOL/L (ref 0–18)
AST SERPL-CCNC: 88 U/L (ref 15–37)
BILIRUB SERPL-MCNC: 3.9 MG/DL (ref 0.1–2)
BUN BLD-MCNC: 18 MG/DL (ref 7–18)
BUN/CREAT SERPL: 23.7 (ref 10–20)
CALCIUM BLD-MCNC: 9.3 MG/DL (ref 8.5–10.1)
CHLORIDE SERPL-SCNC: 109 MMOL/L (ref 98–112)
CO2 SERPL-SCNC: 26 MMOL/L (ref 21–32)
CREAT BLD-MCNC: 0.76 MG/DL (ref 0.7–1.3)
DEPRECATED RDW RBC AUTO: 58.7 FL (ref 35.1–46.3)
ERYTHROCYTE [DISTWIDTH] IN BLOOD BY AUTOMATED COUNT: 14.5 % (ref 11–15)
GLOBULIN PLAS-MCNC: 4.6 G/DL (ref 2.8–4.4)
GLUCOSE BLD-MCNC: 91 MG/DL (ref 70–99)
HCT VFR BLD AUTO: 36.6 % (ref 39–53)
HGB BLD-MCNC: 11.9 G/DL (ref 13–17.5)
INR BLD: 1.07 (ref 0.9–1.2)
M PROTEIN MFR SERPL ELPH: 6.9 G/DL (ref 6.4–8.2)
MCH RBC QN AUTO: 35.8 PG (ref 26–34)
MCHC RBC AUTO-ENTMCNC: 32.5 G/DL (ref 31–37)
MCV RBC AUTO: 110.2 FL (ref 80–100)
OSMOLALITY SERPL CALC.SUM OF ELEC: 295 MOSM/KG (ref 275–295)
PATIENT FASTING Y/N/NP: NO
PLATELET # BLD AUTO: 167 10(3)UL (ref 150–450)
POTASSIUM SERPL-SCNC: 3.7 MMOL/L (ref 3.5–5.1)
PROTHROMBIN TIME: 13.7 SECONDS (ref 11.8–14.5)
RBC # BLD AUTO: 3.32 X10(6)UL (ref 4.3–5.7)
SODIUM SERPL-SCNC: 142 MMOL/L (ref 136–145)
WBC # BLD AUTO: 14.8 X10(3) UL (ref 4–11)

## 2020-08-15 PROCEDURE — 85610 PROTHROMBIN TIME: CPT

## 2020-08-15 PROCEDURE — 36415 COLL VENOUS BLD VENIPUNCTURE: CPT

## 2020-08-15 PROCEDURE — 85027 COMPLETE CBC AUTOMATED: CPT

## 2020-08-15 PROCEDURE — 80053 COMPREHEN METABOLIC PANEL: CPT

## 2020-08-17 ENCOUNTER — TELEPHONE (OUTPATIENT)
Dept: GASTROENTEROLOGY | Facility: CLINIC | Age: 40
End: 2020-08-17

## 2020-08-17 NOTE — TELEPHONE ENCOUNTER
\"Fantastic\" Mayra Jameson - (words of Celso Held and his wife)    Please call Celso Held or wife and see if you can find follow up visit w me in about 5 wks - any spot inc Monday afternoon.

## 2020-08-17 NOTE — PROGRESS NOTES
HPI:    Patient ID: Barb Avila returns today with his very supportive wife for follow-up. I am meeting her for the first time today.   6 months after last visit, he was hospitalized with a severe alcoholic liver disease decompensation 7/18/2020 --> 7/3 David Rangel does describe ongoing diarrhea today 8/17/2020, every day starting around 4 AM.  Estimates he is having 6–8 bowel movements per day. Does not smell the acrid \"garlic\" smell of C. difficile infection right now.   He and his wife are a bit skeptical Gastroenterology Consultation Note     Gulshan Tracy  Patient Status:                  Inpatient  Date of Admission:         7/18/2020, Hosp arthritis ital day #1  Attending:                          Danny Maldonado MD  PCP: #Alcoholic hepatitis-discussed with him his significant alcohol use has resulted in alcoholic hepatitis, his discriminant function is calculated to be just below 32 and therefore I will hold off on prednisolone or Trental at this time.   We will need to lisa Continues to follow with a therapist/counselor. States it is helping. States that therapist is aware of his struggle with alcohol.     Orpha Batters states that with significant stress including financial concerns, ongoing significant neck and thoracic back pain No new factors identified with the increased vomiting. Lilly Scheuermann continues to consume alcohol regularly, probably 2–3 drinks per day. He is trying to dilute the alcohol down more with more ice and water and believes he is consuming less per drink.     Not re Wife has voiced concern with difficulty gaining weight, possibly abnormal weight loss. aCdence Kim believes he is down about 20 pounds compared to previous baseline. Very thin. He and his wife are curious about appetite stimulant medications.       No severe Ferritin has ranged from 295 in November 2014 to 385 on 3/9/2018; more recently, ferritin was 347 on 5/12/2018, then 6 months later 1226 on 11/12/2018    Lipase was normal on 9/16/2018 ED evaluation. LFTs remain elevated.   Labs 7/24/2018 showed  41-year-old male with history of cyclical vomiting, hemochromatosis, hyperbilirubinemia, here with complaints of increased fatigue, nausea, 3 episodes of nonbloody nonbilious emesis over the past few months.   His symptoms recently got worse over the past w Aury Edvin is feeling much better, appetite better after treating the C. difficile in late January as below. He took the full course of metronidazole, apparently no difficulty with nausea or abnormal taste, and has felt well since then. No recurrence.     Violet 1.  New diagnosis C. difficile. Not previously a concern; Chris Perez does not believe he has previously had this infection. No recalled outpatient antibiotics past year.   He was given at least 2 doses of Zosyn antibiotic during the admission of early Septemb We had previously discussed preventative medications for cyclic vomiting including coenzyme Q 10 and today amitriptyline.   Gina Marx is not interested in taking a daily preventative medication, especially anything that could have psychiatric, CNS, mood side e Mr Kevin Ornelas was discharged home for symptomatic care and ongoing outpatient follow-up.      ====================    Progress Notes  Encounter Date: 12/13/2017 9:30 AM  RICHELLE Leong   Nurse Practitioner      []Manual[]Template  []Copied His symptoms now are more in the lower abdominal region. He complains of bilateral lower abdominal cramping. He also reports that his first bowel movement of the day is typically \"normal\".   It gradually progresses over the day to loose but formed stool 2014 EGD performed by Dr. Cassandra Virk, per patient: Minimal inflammation, pathology report in [facility] describes unremarkable esophagus/duodenal biopsies     Social Hx:  + Former cigar smoker  + current smokeless tobacco user   + etoh - 3 drinks hard liquor   - Orders Placed This Encounter      Clostridium difficile(toxigenic)PCR      Ova and Parasites Non-traveler Giardia + Crypto Antigen, Stool      Stool Culture W/ Shigatoxin     Meds This Visit:     No prescriptions requested or ordered in this encounter     Presented to the Emergency Department here 8:30 PM 9/1/2017 complaining of severe weakness, nausea, vomiting. Found to be tachycardic and hypotensive. Concern for dehydration.   Found to have acidosis with CO2 16, lactic acid level 6.7, acute kidney failu EGD examination was performed December 2014 by Dr. Bryson Skinner, minimal inflammation per patient's recollection.   Path report in [facility] describes unremarkable esophagus and duodenal biopsies.        Wt Readings from Last 20 Encounters:  09/03/17 : 162 lb 1.6 COMPARISON:              [facility], [address] LIVER, 5/22/2014, 7:36.      INDICATIONS:               nausea, weakness, fatigue x years     TECHNIQUE:    Limited evaluation of the areas indicated in the order with gray scale and colorflow FINDINGS:          CARDIAC:         The heart is not enlarged. VASCULATURE:            The thoracic aorta has unremarkable configuration without gross evidence of dissection. At the level of the aortic root, the aorta measures 3.9 x 3.5 cm.  The sino-tub COMPARISON:              [facility], CT ABDOMEN PELVIS IV CONTRAST NO ORAL (ER), 5/14/2020, 9:51 PM.     INDICATIONS:               Vomiting, jaundice. History of hemochromatosis.      TECHNIQUE:    Multidetector CT images of the abdomen an KIDNEYS:          The kidneys are diffusely hyperattenuating bilaterally. No renal or ureteral calculi are identified. There is no hydronephrosis or hydroureter. No perinephric or periureteric fat stranding is appreciated.     GI/MESENTERY:           There 4. Development of mild retroperitoneal edema that extends to the root of the small bowel mesentery. A new trace right pleural effusion and mild free peritoneal fluid are also seen.   These findings are nonspecific and could relate to the patient's fluid st Patent main portal vein as well as right and left portal vein branches. Portal venous confluence and splenic vein appear grossly patent. Mild ascites. Gallbladder sludge.   Mild circumferential gallbladder wall thickening is nonspecific and may rel • [START ON 8/31/2020] prednisoLONE 5 MG Oral Tab Take 5 tablets (25 mg total) by mouth daily for 7 days.  (Patient not taking: Reported on 8/17/2020 ) 35 tablet 0   • [START ON 9/8/2020] prednisoLONE 5 MG Oral Tab Take 4 tablets (20 mg total) by mouth levi No diagnosis found.     Ferritin 347 on 5/12/2018; then 1226 on repeat 11/12/2018.     LFTs 1/21/2020:    alkaline phosphatase 64 total bilirubin 6.3     LFTs 1/4/2020:  AST 1593  alkaline phosphatase 60 total bilirubin 2.2     Acute ki Returns for follow-up 3/11/2019 overall doing about the same. Still significant drinking, at least 3 drinks per day.   Rising ferritin and LFTs concerning for worsening alcoholic liver disease.     Seen in the ED 6/3/2019 and admitted 9/2/2019–9/5/2019 for · Today we spent a significant portion of the visit discussing the absolute importance of abstinence from alcohol. Today conteh Marcell's 30-day anniversary of sobriety.   I told him and his wife clearly that he nearly  in July and that we no longer have Previously has taken coenzyme Q 10, increase that to daily and add L-carnitine. Steven Kendalmarcus describes minimal compliance with the coenzyme Q 10 on follow-up visit 12/6/2019. Severe episodes only respond to metoclopramide.   Prescription for metoclopramide sent Now seeing Dr. Jean Rodgers. Repeat MRI abdomen has been ordered -- still needs to reschedule. We again discussed this 12/6/2019. Would defer liver biopsy until he is abstaining from alcohol.     5.   Alcohol abuse with acute on chronic alcoholic liver

## 2020-08-18 ENCOUNTER — NURSE ONLY (OUTPATIENT)
Dept: ALLERGY | Facility: CLINIC | Age: 40
End: 2020-08-18
Payer: COMMERCIAL

## 2020-08-18 DIAGNOSIS — J30.89 ENVIRONMENTAL AND SEASONAL ALLERGIES: ICD-10-CM

## 2020-08-18 PROCEDURE — 95117 IMMUNOTHERAPY INJECTIONS: CPT | Performed by: ALLERGY & IMMUNOLOGY

## 2020-08-19 ENCOUNTER — TELEPHONE (OUTPATIENT)
Dept: GASTROENTEROLOGY | Facility: CLINIC | Age: 40
End: 2020-08-19

## 2020-08-19 NOTE — TELEPHONE ENCOUNTER
Received fax from [employer] for [address] Emailed fax to Forms Dept for completion. Originals placed in [id number] BINA folder.

## 2020-08-19 NOTE — TELEPHONE ENCOUNTER
I spoke to Santos Aburto and we scheduled a f/u appt.     Date, time and [facility] location verified with the pt    9/21/2020  1:00 PM Mildred Guajardo [facility]

## 2020-08-20 NOTE — H&P
[facility], [facility] NICOLE DIAZ    History and Physical    Samual Pallas Patient Status:  No patient class for patient encounter    1980 MRN [id number]   Location [address] [facility] [place] [employer]    Physical Exam:   Vital Signs:  Blood pressure 120/84, pulse 97, temperature 97.9 °F (36.6 °C), resp. rate 20, height 72\", weight 138 lb (62.6 kg), SpO2 99 %.   Physical Exam      Results:     Lab Results   Component Value Date    WBC 14.8 (H) 08

## 2020-08-20 NOTE — PROGRESS NOTES
Ivan Kasper is a 45year old male.   Patient presents with:  Hospital F/U: pt in for hospital f/up       HPI:       Patient hospitalized July 18 - July 30, 2020, for severe alcoholic hepatitis with liver failure, cirrhosis, with complications of deliri daily.     • Levocetirizine Dihydrochloride (XYZAL) 5 MG Oral Tab Take 1 tablet (5 mg total) by mouth nightly.  (Patient taking differently: Take 5 mg by mouth daily.  ) 90 tablet 0   • Montelukast Sodium (SINGULAIR) 10 MG Oral Tab Take 1 tablet (10 mg tota tobacco: Current User        Types: Chew      Tobacco comment: Wife smokes, some passive exposure.      Alcohol use: Not Currently      Alcohol/week: 2.5 standard drinks      Types: 3 Standard drinks or equivalent per week      Frequency: 4 or more times a Sodium 142 136 - 145 mmol/L    Potassium 3.7 3.5 - 5.1 mmol/L    Chloride 109 98 - 112 mmol/L    CO2 26.0 21.0 - 32.0 mmol/L    Anion Gap 7 0 - 18 mmol/L    BUN 18 7 - 18 mg/dL    Creatinine 0.76 0.70 - 1.30 mg/dL    BUN/CREA Ratio 23.7 (H) 10.0 - 20.0 the puncture site by the department nurse. FINDINGS:  FLUID VOLUME/DESCRIPTION: 1023cc clear lew fluid  PUNCTURE SITE: Left lower quadrant  COMPLICATIONS: None. CONCLUSION: Ultrasound guided paracentesis, as detailed above.    Supervising Physi evidence of attic lesion. BILIARY:   Common bile duct is nondilated measuring 0.3 cm. Gallbladder sludge noted with mild circumferential gallbladder wall thickening. OTHER:   Visualized pancreas is unremarkable.   The right kidney measures 10.7 cm in lengt unspecified hemochromatosis type  Plan: History hemochromatosis as well as alcoholic cirrhosis.   To check iron level future    (R16.0) Hepatomegaly  Plan: Hepatic steatosis,    (D69.6) Thrombocytopenia (HCC)  Plan: CBC WITH DIFFERENTIAL WITH PLATELET

## 2020-08-21 RX ORDER — PROPRANOLOL HYDROCHLORIDE 10 MG/1
5 TABLET ORAL 3 TIMES DAILY
Qty: 90 TABLET | Refills: 0 | Status: SHIPPED | OUTPATIENT
Start: 2020-08-21

## 2020-08-25 ENCOUNTER — NURSE ONLY (OUTPATIENT)
Dept: ALLERGY | Facility: CLINIC | Age: 40
End: 2020-08-25
Payer: COMMERCIAL

## 2020-08-25 DIAGNOSIS — E51.9 THIAMINE DEFICIENCY: Primary | ICD-10-CM

## 2020-08-25 DIAGNOSIS — J30.89 ENVIRONMENTAL AND SEASONAL ALLERGIES: ICD-10-CM

## 2020-08-25 PROCEDURE — 95117 IMMUNOTHERAPY INJECTIONS: CPT | Performed by: ALLERGY & IMMUNOLOGY

## 2020-08-25 RX ORDER — ESCITALOPRAM OXALATE 10 MG/1
TABLET ORAL
Qty: 30 TABLET | Refills: 0 | OUTPATIENT
Start: 2020-08-25

## 2020-08-29 RX ORDER — ESCITALOPRAM OXALATE 10 MG/1
10 TABLET ORAL DAILY
Qty: 30 TABLET | Refills: 1 | Status: SHIPPED | OUTPATIENT
Start: 2020-08-29

## 2020-08-29 RX ORDER — MELATONIN
100 EVERY OTHER DAY
Qty: 30 TABLET | Refills: 0 | Status: SHIPPED | OUTPATIENT
Start: 2020-08-29 | End: 2021-11-15

## 2020-09-02 ENCOUNTER — NURSE ONLY (OUTPATIENT)
Dept: ALLERGY | Facility: CLINIC | Age: 40
End: 2020-09-02
Payer: COMMERCIAL

## 2020-09-02 DIAGNOSIS — J30.89 ENVIRONMENTAL AND SEASONAL ALLERGIES: ICD-10-CM

## 2020-09-02 PROCEDURE — 95117 IMMUNOTHERAPY INJECTIONS: CPT | Performed by: ALLERGY & IMMUNOLOGY

## 2020-09-02 PROCEDURE — 95165 ANTIGEN THERAPY SERVICES: CPT | Performed by: ALLERGY & IMMUNOLOGY

## 2020-09-09 ENCOUNTER — NURSE ONLY (OUTPATIENT)
Dept: ALLERGY | Facility: CLINIC | Age: 40
End: 2020-09-09
Payer: COMMERCIAL

## 2020-09-09 DIAGNOSIS — J30.89 ENVIRONMENTAL AND SEASONAL ALLERGIES: ICD-10-CM

## 2020-09-09 PROCEDURE — 95165 ANTIGEN THERAPY SERVICES: CPT | Performed by: ALLERGY & IMMUNOLOGY

## 2020-09-09 PROCEDURE — 95117 IMMUNOTHERAPY INJECTIONS: CPT | Performed by: ALLERGY & IMMUNOLOGY

## 2020-09-10 NOTE — TELEPHONE ENCOUNTER
Dr. Tomas Amezcua,     Please sign off on form: Disab  -Highlight the patient and hit \"Chart\" button.   -In Chart Review, w/in the Encounter tab - click 1 time on the Telephone call encounter for 8/19/2020 Scroll down the telephone encounter.  -Click \"scan on\"

## 2020-09-11 ENCOUNTER — TELEPHONE (OUTPATIENT)
Dept: GASTROENTEROLOGY | Facility: CLINIC | Age: 40
End: 2020-09-11

## 2020-09-14 ENCOUNTER — TELEPHONE (OUTPATIENT)
Dept: GASTROENTEROLOGY | Facility: CLINIC | Age: 40
End: 2020-09-14

## 2020-09-14 NOTE — TELEPHONE ENCOUNTER
Jasmin requesting Return to Work note with no restrictions on Tuesday, 9/15/20. Please send via [employer]. For additonal questions please call. Thank you.

## 2020-09-14 NOTE — TELEPHONE ENCOUNTER
Patients wife called and her something not correct was [employer] not receiving form and paperwork. Saw form was completed and signed off and faxed [phone].

## 2020-09-15 ENCOUNTER — NURSE ONLY (OUTPATIENT)
Dept: ALLERGY | Facility: CLINIC | Age: 40
End: 2020-09-15
Payer: COMMERCIAL

## 2020-09-15 ENCOUNTER — LAB ENCOUNTER (OUTPATIENT)
Dept: LAB | Facility: HOSPITAL | Age: 40
End: 2020-09-15
Attending: INTERNAL MEDICINE
Payer: COMMERCIAL

## 2020-09-15 DIAGNOSIS — J30.89 ENVIRONMENTAL AND SEASONAL ALLERGIES: ICD-10-CM

## 2020-09-15 DIAGNOSIS — R17 JAUNDICE: ICD-10-CM

## 2020-09-15 DIAGNOSIS — K70.31 ALCOHOLIC CIRRHOSIS OF LIVER WITH ASCITES (HCC): ICD-10-CM

## 2020-09-15 LAB
ALBUMIN SERPL-MCNC: 3.4 G/DL (ref 3.4–5)
ALBUMIN/GLOB SERPL: 0.8 {RATIO} (ref 1–2)
ALP LIVER SERPL-CCNC: 78 U/L (ref 45–117)
ALT SERPL-CCNC: 61 U/L (ref 16–61)
ANION GAP SERPL CALC-SCNC: 4 MMOL/L (ref 0–18)
AST SERPL-CCNC: 43 U/L (ref 15–37)
BILIRUB SERPL-MCNC: 1 MG/DL (ref 0.1–2)
BUN BLD-MCNC: 37 MG/DL (ref 7–18)
BUN/CREAT SERPL: 44 (ref 10–20)
CALCIUM BLD-MCNC: 10.2 MG/DL (ref 8.5–10.1)
CHLORIDE SERPL-SCNC: 107 MMOL/L (ref 98–112)
CO2 SERPL-SCNC: 27 MMOL/L (ref 21–32)
CREAT BLD-MCNC: 0.84 MG/DL (ref 0.7–1.3)
DEPRECATED RDW RBC AUTO: 44.5 FL (ref 35.1–46.3)
ERYTHROCYTE [DISTWIDTH] IN BLOOD BY AUTOMATED COUNT: 12 % (ref 11–15)
GLOBULIN PLAS-MCNC: 4.4 G/DL (ref 2.8–4.4)
GLUCOSE BLD-MCNC: 109 MG/DL (ref 70–99)
HCT VFR BLD AUTO: 38.8 % (ref 39–53)
HGB BLD-MCNC: 13.2 G/DL (ref 13–17.5)
INR BLD: 1.04 (ref 0.9–1.2)
M PROTEIN MFR SERPL ELPH: 7.8 G/DL (ref 6.4–8.2)
MCH RBC QN AUTO: 34.4 PG (ref 26–34)
MCHC RBC AUTO-ENTMCNC: 34 G/DL (ref 31–37)
MCV RBC AUTO: 101 FL (ref 80–100)
OSMOLALITY SERPL CALC.SUM OF ELEC: 295 MOSM/KG (ref 275–295)
PATIENT FASTING Y/N/NP: NO
PLATELET # BLD AUTO: 149 10(3)UL (ref 150–450)
POTASSIUM SERPL-SCNC: 4.8 MMOL/L (ref 3.5–5.1)
PROTHROMBIN TIME: 13.4 SECONDS (ref 11.8–14.5)
RBC # BLD AUTO: 3.84 X10(6)UL (ref 4.3–5.7)
SODIUM SERPL-SCNC: 138 MMOL/L (ref 136–145)
WBC # BLD AUTO: 11.9 X10(3) UL (ref 4–11)

## 2020-09-15 PROCEDURE — 80053 COMPREHEN METABOLIC PANEL: CPT

## 2020-09-15 PROCEDURE — 36415 COLL VENOUS BLD VENIPUNCTURE: CPT

## 2020-09-15 PROCEDURE — 85610 PROTHROMBIN TIME: CPT

## 2020-09-15 PROCEDURE — 85027 COMPLETE CBC AUTOMATED: CPT

## 2020-09-15 PROCEDURE — 95117 IMMUNOTHERAPY INJECTIONS: CPT | Performed by: ALLERGY & IMMUNOLOGY

## 2020-09-21 ENCOUNTER — TELEPHONE (OUTPATIENT)
Dept: GASTROENTEROLOGY | Facility: CLINIC | Age: 40
End: 2020-09-21

## 2020-09-21 NOTE — PATIENT INSTRUCTIONS
1. Continue on prednisolone taper. 2. Follow up in GI clinic with Dr. Kervin Castrejon in 4-6 weeks - please make this appointment before you leave today as Dr. Kervin Castrejon tends to book out in advance.     3. Congratulations on 66 days of alcohol abstinence - I wish y

## 2020-09-21 NOTE — TELEPHONE ENCOUNTER
Dr. Venita Loja,    I saw Mr. Ksotas Franco today at your request in the clinic. Please see today's office encounter. He is much improved without any complaints today. If you have additional recommendations, please feel free to let me know and I can reach out to him.     Sincerely,    Erica Walton PA-C

## 2020-09-21 NOTE — PROGRESS NOTES
Denisha Jones is a 45year old male.     Chief complaint: establish care and follow up on chronic conditions      HPI:     45year old male with PMH as listed below here for   Establish care and follow up on chronic conditions   Patient was recently hospit daily.     • Multiple Vitamin-Folic Acid Oral Tab Take 1 tablet by mouth daily. • Acidophilus/Pectin Oral Cap Take 1 capsule by mouth 2 (two) times daily.  60 capsule 0   • AMITRIPTYLINE HCL 10 MG Oral Tab TAKE ONE TABLET BY MOUTH AT BEDTIME  (Patient Fatty liver     Gastroesophageal reflux disease     Inflammatory neuropathy (HCC)     Abnormal urinalysis     Cervical spinal stenosis     Allergic rhinitis     Closed fracture of middle or proximal phalanx or phalanges of hand     Stiffness of joint, hand diarrhea  Continue vancomycin pet GI recs     4.  Screening for lipid disorders  Lipid panel     5-leukocytosis:   cbc ordered by GI to be done monthly advised to repeat in 1 month   It is trending down   Could also be related to prednisolone     6-hypercal

## 2020-09-21 NOTE — PROGRESS NOTES
[address] [facility] [address] Gastroenterology                                                                                                  Clinic History and Physical     Pa On a tapering regimen for prednisolone - states target \"stop\" date is the first week of October. No further issues with jaundice/pruritus/scleral icterus. Sober for 66 days today. Quite proud of this.     Target to end prednisolone early October 4      Binge frequency: Weekly      Comment: 1/2 fifth vodka daily over 10 years    Drug use: No       Medications (Active prior to today's visit):  Current Outpatient Medications   Medication Sig Dispense Refill   • vancomycin HCl 125 MG Oral Cap      • e HPI  GENITOURINARY:  negative for dysuria or gross hematuria  INTEGUMENT/BREAST:  SKIN:  negative for jaundice   ALLERGIC/IMMUNOLOGIC:  negative for hay fever  ENDOCRINE:  negative for cold intolerance and heat intolerance  MUSCULOSKELETAL:  negative for j alcohol, which I congratulated the patient on. I do see quite a few ethyl-glucuronide tests negative in the chart. He has a target date on the taper for prednisolone of the first week of October.  I recommend a follow up in the clinic in 4-6 weeks with

## 2020-09-22 ENCOUNTER — NURSE ONLY (OUTPATIENT)
Dept: ALLERGY | Facility: CLINIC | Age: 40
End: 2020-09-22
Payer: COMMERCIAL

## 2020-09-22 DIAGNOSIS — J30.89 ENVIRONMENTAL AND SEASONAL ALLERGIES: ICD-10-CM

## 2020-09-22 PROCEDURE — 95117 IMMUNOTHERAPY INJECTIONS: CPT | Performed by: ALLERGY & IMMUNOLOGY

## 2020-09-24 DIAGNOSIS — A04.71 RECURRENT CLOSTRIDIOIDES DIFFICILE DIARRHEA: ICD-10-CM

## 2020-09-24 DIAGNOSIS — E83.52 HYPERCALCEMIA: ICD-10-CM

## 2020-09-24 DIAGNOSIS — Z13.220 SCREENING FOR LIPID DISORDERS: ICD-10-CM

## 2020-09-24 DIAGNOSIS — D69.6 THROMBOCYTOPENIA (HCC): ICD-10-CM

## 2020-09-24 DIAGNOSIS — K70.10 ALCOHOLIC HEPATITIS, UNSPECIFIED WHETHER ASCITES PRESENT: ICD-10-CM

## 2020-09-24 DIAGNOSIS — D72.829 LEUKOCYTOSIS, UNSPECIFIED TYPE: ICD-10-CM

## 2020-09-24 DIAGNOSIS — F10.21 HISTORY OF ALCOHOLISM (HCC): ICD-10-CM

## 2020-09-24 NOTE — TELEPHONE ENCOUNTER
Requested Prescriptions     Pending Prescriptions Disp Refills   • VANCOMYCIN  MG Oral Cap [Pharmacy Med Name: Vancomycin Hydrochloride 125 Mg Cap [place]] 56 capsule 0     Sig: Take 1 capsule  by mouth 4  times daily for 14 days.      lov-8/17/20  lr-9

## 2020-09-25 ENCOUNTER — TELEPHONE (OUTPATIENT)
Dept: GASTROENTEROLOGY | Facility: CLINIC | Age: 40
End: 2020-09-25

## 2020-09-25 RX ORDER — VANCOMYCIN HYDROCHLORIDE 125 MG/1
CAPSULE ORAL
Qty: 56 CAPSULE | Refills: 0 | Status: SHIPPED | OUTPATIENT
Start: 2020-09-25 | End: 2021-11-15

## 2020-09-25 NOTE — TELEPHONE ENCOUNTER
Received fax from [employer] regarding a [address] for patient. Emailed fax to Forms Department.     Originals in [facility] folder

## 2020-10-05 ENCOUNTER — NURSE ONLY (OUTPATIENT)
Dept: ALLERGY | Facility: CLINIC | Age: 40
End: 2020-10-05
Payer: COMMERCIAL

## 2020-10-05 DIAGNOSIS — J30.89 ENVIRONMENTAL AND SEASONAL ALLERGIES: ICD-10-CM

## 2020-10-05 PROCEDURE — 95117 IMMUNOTHERAPY INJECTIONS: CPT | Performed by: ALLERGY & IMMUNOLOGY

## 2020-10-10 DIAGNOSIS — K70.10 ALCOHOLIC HEPATITIS, UNSPECIFIED WHETHER ASCITES PRESENT: ICD-10-CM

## 2020-10-10 DIAGNOSIS — F10.21 HISTORY OF ALCOHOLISM (HCC): ICD-10-CM

## 2020-10-10 DIAGNOSIS — E83.52 HYPERCALCEMIA: ICD-10-CM

## 2020-10-10 DIAGNOSIS — Z13.220 SCREENING FOR LIPID DISORDERS: ICD-10-CM

## 2020-10-10 DIAGNOSIS — D69.6 THROMBOCYTOPENIA (HCC): ICD-10-CM

## 2020-10-10 DIAGNOSIS — D72.829 LEUKOCYTOSIS, UNSPECIFIED TYPE: ICD-10-CM

## 2020-10-10 DIAGNOSIS — A04.71 RECURRENT CLOSTRIDIOIDES DIFFICILE DIARRHEA: ICD-10-CM

## 2020-10-26 ENCOUNTER — NURSE ONLY (OUTPATIENT)
Dept: ALLERGY | Facility: CLINIC | Age: 40
End: 2020-10-26
Payer: COMMERCIAL

## 2020-10-26 DIAGNOSIS — J30.89 ENVIRONMENTAL AND SEASONAL ALLERGIES: ICD-10-CM

## 2020-10-26 PROCEDURE — 95117 IMMUNOTHERAPY INJECTIONS: CPT | Performed by: ALLERGY & IMMUNOLOGY

## 2020-10-30 RX ORDER — VANCOMYCIN HYDROCHLORIDE 125 MG/1
CAPSULE ORAL
Qty: 56 CAPSULE | Refills: 0 | OUTPATIENT
Start: 2020-10-30

## 2020-11-16 ENCOUNTER — NURSE ONLY (OUTPATIENT)
Dept: ALLERGY | Facility: CLINIC | Age: 40
End: 2020-11-16
Payer: COMMERCIAL

## 2020-11-16 DIAGNOSIS — J30.89 ENVIRONMENTAL AND SEASONAL ALLERGIES: ICD-10-CM

## 2020-11-16 PROCEDURE — 95165 ANTIGEN THERAPY SERVICES: CPT | Performed by: ALLERGY & IMMUNOLOGY

## 2020-11-16 PROCEDURE — 95117 IMMUNOTHERAPY INJECTIONS: CPT | Performed by: ALLERGY & IMMUNOLOGY

## 2020-12-04 NOTE — TELEPHONE ENCOUNTER
Requested Prescriptions     Pending Prescriptions Disp Refills   • AMITRIPTYLINE HCL 10 MG Oral Tab [Pharmacy Med Name: Amitriptyline Hydrochloride 10 Mg Tab [place]] 90 tablet 0     Sig: Take 1 tablet  by mouth nightly.      LOV  9/21/2020  LR  1/07/2020    e

## 2020-12-07 ENCOUNTER — NURSE ONLY (OUTPATIENT)
Dept: ALLERGY | Facility: CLINIC | Age: 40
End: 2020-12-07
Payer: COMMERCIAL

## 2020-12-07 DIAGNOSIS — J30.89 ENVIRONMENTAL AND SEASONAL ALLERGIES: ICD-10-CM

## 2020-12-07 PROCEDURE — 95117 IMMUNOTHERAPY INJECTIONS: CPT | Performed by: ALLERGY & IMMUNOLOGY

## 2020-12-07 RX ORDER — AMITRIPTYLINE HYDROCHLORIDE 10 MG/1
10 TABLET, FILM COATED ORAL NIGHTLY
Qty: 90 TABLET | Refills: 0 | Status: SHIPPED | OUTPATIENT
Start: 2020-12-07 | End: 2021-03-02

## 2020-12-14 RX ORDER — MONTELUKAST SODIUM 10 MG/1
TABLET ORAL
Qty: 90 TABLET | Refills: 0 | Status: SHIPPED | OUTPATIENT
Start: 2020-12-14 | End: 2021-02-01

## 2020-12-14 NOTE — TELEPHONE ENCOUNTER
Note made in patient's AIT chart that they are due for follow up by February. Patient scheduled for AIT on 1/4.

## 2020-12-14 NOTE — TELEPHONE ENCOUNTER
Patient last seen in Allergy for physician visit on 8/13/2020 for . . . Perennial allergic rhinitis  (primary encounter diagnosis)  Nasal septal deviation  Environmental and seasonal allergies     Refill request received for .  . .    Montelukast Sodium

## 2020-12-20 DIAGNOSIS — J30.89 ENVIRONMENTAL AND SEASONAL ALLERGIES: ICD-10-CM

## 2020-12-21 RX ORDER — LEVOCETIRIZINE DIHYDROCHLORIDE 5 MG/1
5 TABLET, FILM COATED ORAL NIGHTLY
Qty: 90 TABLET | Refills: 0 | Status: SHIPPED | OUTPATIENT
Start: 2020-12-21 | End: 2021-02-01

## 2020-12-21 NOTE — TELEPHONE ENCOUNTER
Refill requested for   Levocetirizine Dihydrochloride (XYZAL) 5 MG Oral Tab 90 tablet 0 6/1/2020    Sig:   Take 1 tablet (5 mg total) by mouth nightly.      Patient taking differently:   Take 5 mg by mouth daily.       Route:   Oral         Last office visi

## 2021-01-04 ENCOUNTER — NURSE ONLY (OUTPATIENT)
Dept: ALLERGY | Facility: CLINIC | Age: 41
End: 2021-01-04

## 2021-01-04 ENCOUNTER — TELEPHONE (OUTPATIENT)
Dept: INTERNAL MEDICINE CLINIC | Facility: CLINIC | Age: 41
End: 2021-01-04

## 2021-01-04 DIAGNOSIS — J30.89 ENVIRONMENTAL AND SEASONAL ALLERGIES: ICD-10-CM

## 2021-01-04 DIAGNOSIS — Z20.822 EXPOSURE TO COVID-19 VIRUS: Primary | ICD-10-CM

## 2021-01-04 PROCEDURE — 95117 IMMUNOTHERAPY INJECTIONS: CPT | Performed by: ALLERGY & IMMUNOLOGY

## 2021-01-05 ENCOUNTER — LAB ENCOUNTER (OUTPATIENT)
Dept: LAB | Facility: HOSPITAL | Age: 41
End: 2021-01-05
Attending: INTERNAL MEDICINE
Payer: COMMERCIAL

## 2021-01-05 DIAGNOSIS — Z20.822 EXPOSURE TO COVID-19 VIRUS: ICD-10-CM

## 2021-01-07 LAB — SARS-COV-2 BY PCR: NOT DETECTED

## 2021-02-01 ENCOUNTER — OFFICE VISIT (OUTPATIENT)
Dept: ALLERGY | Facility: CLINIC | Age: 41
End: 2021-02-01
Payer: COMMERCIAL

## 2021-02-01 ENCOUNTER — NURSE ONLY (OUTPATIENT)
Dept: ALLERGY | Facility: CLINIC | Age: 41
End: 2021-02-01
Payer: COMMERCIAL

## 2021-02-01 VITALS
SYSTOLIC BLOOD PRESSURE: 156 MMHG | WEIGHT: 159.81 LBS | DIASTOLIC BLOOD PRESSURE: 76 MMHG | OXYGEN SATURATION: 99 % | BODY MASS INDEX: 22 KG/M2 | RESPIRATION RATE: 18 BRPM

## 2021-02-01 DIAGNOSIS — J30.89 ENVIRONMENTAL AND SEASONAL ALLERGIES: ICD-10-CM

## 2021-02-01 DIAGNOSIS — J30.89 PERENNIAL ALLERGIC RHINITIS: Primary | ICD-10-CM

## 2021-02-01 DIAGNOSIS — J34.2 NASAL SEPTAL DEVIATION: ICD-10-CM

## 2021-02-01 PROCEDURE — 95117 IMMUNOTHERAPY INJECTIONS: CPT | Performed by: ALLERGY & IMMUNOLOGY

## 2021-02-01 PROCEDURE — 99214 OFFICE O/P EST MOD 30 MIN: CPT | Performed by: ALLERGY & IMMUNOLOGY

## 2021-02-01 PROCEDURE — 3078F DIAST BP <80 MM HG: CPT | Performed by: ALLERGY & IMMUNOLOGY

## 2021-02-01 PROCEDURE — 3077F SYST BP >= 140 MM HG: CPT | Performed by: ALLERGY & IMMUNOLOGY

## 2021-02-01 RX ORDER — MONTELUKAST SODIUM 10 MG/1
10 TABLET ORAL NIGHTLY
Qty: 90 TABLET | Refills: 0 | Status: SHIPPED | OUTPATIENT
Start: 2021-02-01 | End: 2021-03-25

## 2021-02-01 RX ORDER — LEVOCETIRIZINE DIHYDROCHLORIDE 5 MG/1
5 TABLET, FILM COATED ORAL NIGHTLY
Qty: 90 TABLET | Refills: 1 | Status: SHIPPED | OUTPATIENT
Start: 2021-02-01 | End: 2021-09-11

## 2021-02-01 RX ORDER — QUETIAPINE 25 MG/1
25 TABLET, FILM COATED ORAL NIGHTLY
COMMUNITY

## 2021-02-01 NOTE — PROGRESS NOTES
Jim Booth is a 45year old male. HPI:   No chief complaint on file.     Patient is a 78-year-old male who presents for follow-up  Chief complaint of allergies    Patient last seen by me on August 13, 2020    Patient with a history of allergic rhinit tobacco: Current User        Types: Chew      Tobacco comment: Wife smokes, some passive exposure.      Alcohol use: Not Currently      Alcohol/week: 2.5 standard drinks      Types: 3 Standard drinks or equivalent per week      Frequency: 4 or more times a daily. (Patient not taking: Reported on 2/1/2021 ) 3 Bottle 1       Allergies:  No Known Allergies      ROS:   Allergic/Immuno:  See hpi  Cardiovascular:  Negative for irregular heartbeat/palpitations, chest pain, edema  Constitutional:  Negative night swe Patient understands these inherent risk and wishes to remain on immunotherapy while on a beta-blocker reviewed currently recommended 3 to 5 years of maintenance dosing      Over 30 minutes spent in direct face-to-face contact with more than 50% discussing

## 2021-02-02 NOTE — PATIENT INSTRUCTIONS
Recs:  Continue with maintenance dose immunotherapy every 4 weeks to underlying environmental allergens including trees weeds dogs and cats.    Continue with medications including Xyzal and Singulair  Add Dymista if refractory  Reviewed with patient that be

## 2021-02-28 NOTE — PROGRESS NOTES
[facility]    Progress Note    Virginia Huitron Patient Status:  Inpatient    1980 MRN [id number]   Location [facility] 5SW/SE Attending Elaina Begum MD   Hosp Day # 2 PCP Candace Ramos MD       Subjective:     Pt s monitor Phos, Mg, BMP  - wean IVF  - pt tolerating diet     Chronic alcohol abuse with signs of withdrawal impending DTs. Pt not having severe withdrawal at this time.   - CIWA protocol  - wean IVF  - cont thiamine and folic acid  - counseled on etoh cessa 28 years

## 2021-03-01 ENCOUNTER — NURSE ONLY (OUTPATIENT)
Dept: ALLERGY | Facility: CLINIC | Age: 41
End: 2021-03-01
Payer: COMMERCIAL

## 2021-03-01 DIAGNOSIS — J30.89 ENVIRONMENTAL AND SEASONAL ALLERGIES: ICD-10-CM

## 2021-03-01 PROCEDURE — 95117 IMMUNOTHERAPY INJECTIONS: CPT | Performed by: ALLERGY & IMMUNOLOGY

## 2021-03-02 RX ORDER — OMEPRAZOLE 40 MG/1
40 CAPSULE, DELAYED RELEASE ORAL
Qty: 90 CAPSULE | Refills: 1 | Status: SHIPPED | OUTPATIENT
Start: 2021-03-02 | End: 2023-06-08

## 2021-03-02 RX ORDER — AMITRIPTYLINE HYDROCHLORIDE 10 MG/1
10 TABLET, FILM COATED ORAL NIGHTLY
Qty: 90 TABLET | Refills: 1 | Status: SHIPPED | OUTPATIENT
Start: 2021-03-02 | End: 2023-06-08

## 2021-03-25 RX ORDER — MONTELUKAST SODIUM 10 MG/1
10 TABLET ORAL NIGHTLY
Qty: 90 TABLET | Refills: 1 | Status: SHIPPED | OUTPATIENT
Start: 2021-03-25 | End: 2021-06-21

## 2021-03-25 NOTE — TELEPHONE ENCOUNTER
Patient last seen in Allergy 2/1/2021 for . . .    Environmental and seasonal allergies  Perennial allergic rhinitis  (primary encounter diagnosis)  Nasal septal deviation    Refill request received for .  . .    Montelukast Sodium 10 MG Oral Tab 90 tablet

## 2021-03-29 ENCOUNTER — NURSE ONLY (OUTPATIENT)
Dept: ALLERGY | Facility: CLINIC | Age: 41
End: 2021-03-29

## 2021-03-29 DIAGNOSIS — J30.89 ENVIRONMENTAL AND SEASONAL ALLERGIES: ICD-10-CM

## 2021-03-29 PROCEDURE — 95165 ANTIGEN THERAPY SERVICES: CPT | Performed by: ALLERGY & IMMUNOLOGY

## 2021-03-29 PROCEDURE — 95117 IMMUNOTHERAPY INJECTIONS: CPT | Performed by: ALLERGY & IMMUNOLOGY

## 2021-04-02 ENCOUNTER — IMMUNIZATION (OUTPATIENT)
Dept: LAB | Age: 41
End: 2021-04-02
Attending: HOSPITALIST
Payer: OTHER GOVERNMENT

## 2021-04-02 DIAGNOSIS — Z23 NEED FOR VACCINATION: Primary | ICD-10-CM

## 2021-04-02 PROCEDURE — 0001A SARSCOV2 VAC 30MCG/0.3ML IM: CPT

## 2021-04-24 ENCOUNTER — IMMUNIZATION (OUTPATIENT)
Dept: LAB | Age: 41
End: 2021-04-24
Attending: HOSPITALIST
Payer: COMMERCIAL

## 2021-04-24 DIAGNOSIS — Z23 NEED FOR VACCINATION: Primary | ICD-10-CM

## 2021-04-24 PROCEDURE — 0002A SARSCOV2 VAC 30MCG/0.3ML IM: CPT

## 2021-04-26 ENCOUNTER — NURSE ONLY (OUTPATIENT)
Dept: ALLERGY | Facility: CLINIC | Age: 41
End: 2021-04-26
Payer: COMMERCIAL

## 2021-04-26 DIAGNOSIS — J30.89 ENVIRONMENTAL AND SEASONAL ALLERGIES: ICD-10-CM

## 2021-04-26 PROCEDURE — 95117 IMMUNOTHERAPY INJECTIONS: CPT | Performed by: ALLERGY & IMMUNOLOGY

## 2021-05-01 ENCOUNTER — LAB ENCOUNTER (OUTPATIENT)
Dept: LAB | Facility: HOSPITAL | Age: 41
End: 2021-05-01
Attending: INTERNAL MEDICINE
Payer: COMMERCIAL

## 2021-05-01 DIAGNOSIS — K70.31 ALCOHOLIC CIRRHOSIS OF LIVER WITH ASCITES (HCC): ICD-10-CM

## 2021-05-01 DIAGNOSIS — R17 JAUNDICE: ICD-10-CM

## 2021-05-01 PROCEDURE — 85610 PROTHROMBIN TIME: CPT

## 2021-05-01 PROCEDURE — 36415 COLL VENOUS BLD VENIPUNCTURE: CPT

## 2021-05-01 PROCEDURE — 80053 COMPREHEN METABOLIC PANEL: CPT

## 2021-05-01 PROCEDURE — 85027 COMPLETE CBC AUTOMATED: CPT

## 2021-05-25 ENCOUNTER — NURSE ONLY (OUTPATIENT)
Dept: ALLERGY | Facility: CLINIC | Age: 41
End: 2021-05-25
Payer: COMMERCIAL

## 2021-05-25 DIAGNOSIS — J30.89 ENVIRONMENTAL AND SEASONAL ALLERGIES: ICD-10-CM

## 2021-05-25 PROCEDURE — 95117 IMMUNOTHERAPY INJECTIONS: CPT | Performed by: ALLERGY & IMMUNOLOGY

## 2021-06-21 ENCOUNTER — NURSE ONLY (OUTPATIENT)
Dept: ALLERGY | Facility: CLINIC | Age: 41
End: 2021-06-21
Payer: COMMERCIAL

## 2021-06-21 DIAGNOSIS — J30.89 ENVIRONMENTAL AND SEASONAL ALLERGIES: ICD-10-CM

## 2021-06-21 PROCEDURE — 95117 IMMUNOTHERAPY INJECTIONS: CPT | Performed by: ALLERGY & IMMUNOLOGY

## 2021-06-21 RX ORDER — MONTELUKAST SODIUM 10 MG/1
10 TABLET ORAL NIGHTLY
Qty: 90 TABLET | Refills: 1 | Status: SHIPPED | OUTPATIENT
Start: 2021-06-21 | End: 2021-12-09

## 2021-07-12 ENCOUNTER — LAB ENCOUNTER (OUTPATIENT)
Dept: LAB | Facility: HOSPITAL | Age: 41
End: 2021-07-12
Attending: INTERNAL MEDICINE
Payer: COMMERCIAL

## 2021-07-12 DIAGNOSIS — R17 JAUNDICE: ICD-10-CM

## 2021-07-12 DIAGNOSIS — K70.31 ALCOHOLIC CIRRHOSIS OF LIVER WITH ASCITES (HCC): ICD-10-CM

## 2021-07-12 LAB
ALBUMIN SERPL-MCNC: 4.1 G/DL (ref 3.4–5)
ALBUMIN/GLOB SERPL: 1 {RATIO} (ref 1–2)
ALP LIVER SERPL-CCNC: 143 U/L
ALT SERPL-CCNC: 29 U/L
ANION GAP SERPL CALC-SCNC: 7 MMOL/L (ref 0–18)
AST SERPL-CCNC: 19 U/L (ref 15–37)
BILIRUB SERPL-MCNC: 1.9 MG/DL (ref 0.1–2)
BUN BLD-MCNC: 10 MG/DL (ref 7–18)
BUN/CREAT SERPL: 10 (ref 10–20)
CALCIUM BLD-MCNC: 10.2 MG/DL (ref 8.5–10.1)
CHLORIDE SERPL-SCNC: 104 MMOL/L (ref 98–112)
CO2 SERPL-SCNC: 28 MMOL/L (ref 21–32)
CREAT BLD-MCNC: 1 MG/DL
DEPRECATED RDW RBC AUTO: 41.4 FL (ref 35.1–46.3)
ERYTHROCYTE [DISTWIDTH] IN BLOOD BY AUTOMATED COUNT: 12.6 % (ref 11–15)
GLOBULIN PLAS-MCNC: 4.2 G/DL (ref 2.8–4.4)
GLUCOSE BLD-MCNC: 95 MG/DL (ref 70–99)
HCT VFR BLD AUTO: 41.8 %
HGB BLD-MCNC: 13.8 G/DL
INR BLD: 0.99 (ref 0.9–1.2)
M PROTEIN MFR SERPL ELPH: 8.3 G/DL (ref 6.4–8.2)
MCH RBC QN AUTO: 29.6 PG (ref 26–34)
MCHC RBC AUTO-ENTMCNC: 33 G/DL (ref 31–37)
MCV RBC AUTO: 89.7 FL
OSMOLALITY SERPL CALC.SUM OF ELEC: 287 MOSM/KG (ref 275–295)
PATIENT FASTING Y/N/NP: NO
PLATELET # BLD AUTO: 190 10(3)UL (ref 150–450)
POTASSIUM SERPL-SCNC: 4 MMOL/L (ref 3.5–5.1)
PROTHROMBIN TIME: 12.9 SECONDS (ref 11.8–14.5)
RBC # BLD AUTO: 4.66 X10(6)UL
SODIUM SERPL-SCNC: 139 MMOL/L (ref 136–145)
WBC # BLD AUTO: 6.4 X10(3) UL (ref 4–11)

## 2021-07-12 PROCEDURE — 85610 PROTHROMBIN TIME: CPT

## 2021-07-12 PROCEDURE — 36415 COLL VENOUS BLD VENIPUNCTURE: CPT

## 2021-07-12 PROCEDURE — 80053 COMPREHEN METABOLIC PANEL: CPT

## 2021-07-12 PROCEDURE — 85027 COMPLETE CBC AUTOMATED: CPT

## 2021-07-19 ENCOUNTER — NURSE ONLY (OUTPATIENT)
Dept: ALLERGY | Facility: CLINIC | Age: 41
End: 2021-07-19
Payer: COMMERCIAL

## 2021-07-19 DIAGNOSIS — J30.89 ENVIRONMENTAL AND SEASONAL ALLERGIES: ICD-10-CM

## 2021-07-19 PROCEDURE — 95117 IMMUNOTHERAPY INJECTIONS: CPT | Performed by: ALLERGY & IMMUNOLOGY

## 2021-08-16 ENCOUNTER — NURSE ONLY (OUTPATIENT)
Dept: ALLERGY | Facility: CLINIC | Age: 41
End: 2021-08-16
Payer: COMMERCIAL

## 2021-08-16 DIAGNOSIS — J30.89 ENVIRONMENTAL AND SEASONAL ALLERGIES: ICD-10-CM

## 2021-08-16 PROCEDURE — 95117 IMMUNOTHERAPY INJECTIONS: CPT | Performed by: ALLERGY & IMMUNOLOGY

## 2021-08-16 PROCEDURE — 95165 ANTIGEN THERAPY SERVICES: CPT | Performed by: ALLERGY & IMMUNOLOGY

## 2021-09-10 DIAGNOSIS — J30.89 ENVIRONMENTAL AND SEASONAL ALLERGIES: ICD-10-CM

## 2021-09-11 RX ORDER — LEVOCETIRIZINE DIHYDROCHLORIDE 5 MG/1
TABLET, FILM COATED ORAL
Qty: 90 TABLET | Refills: 0 | Status: SHIPPED | OUTPATIENT
Start: 2021-09-11 | End: 2021-12-09

## 2021-09-11 NOTE — TELEPHONE ENCOUNTER
Patient last seen in Allergy 2/1/2021 for . . .    Environmental and seasonal allergies  Perennial allergic rhinitis  (primary encounter diagnosis)  Nasal septal deviation     Refill request received for .  . .    Levocetirizine Dihydrochloride 5 MG Oral Ta

## 2021-09-13 ENCOUNTER — NURSE ONLY (OUTPATIENT)
Dept: ALLERGY | Facility: CLINIC | Age: 41
End: 2021-09-13
Payer: COMMERCIAL

## 2021-09-13 DIAGNOSIS — J30.89 ENVIRONMENTAL AND SEASONAL ALLERGIES: ICD-10-CM

## 2021-09-13 PROCEDURE — 95117 IMMUNOTHERAPY INJECTIONS: CPT | Performed by: ALLERGY & IMMUNOLOGY

## 2021-10-05 ENCOUNTER — OFFICE VISIT (OUTPATIENT)
Dept: OTOLARYNGOLOGY | Facility: CLINIC | Age: 41
End: 2021-10-05
Payer: COMMERCIAL

## 2021-10-05 VITALS — BODY MASS INDEX: 21.54 KG/M2 | HEIGHT: 72 IN | WEIGHT: 159 LBS

## 2021-10-05 DIAGNOSIS — J34.89 NASAL OBSTRUCTION: ICD-10-CM

## 2021-10-05 DIAGNOSIS — J32.4 CHRONIC PANSINUSITIS: Primary | ICD-10-CM

## 2021-10-05 PROCEDURE — 3008F BODY MASS INDEX DOCD: CPT | Performed by: OTOLARYNGOLOGY

## 2021-10-05 PROCEDURE — 99214 OFFICE O/P EST MOD 30 MIN: CPT | Performed by: OTOLARYNGOLOGY

## 2021-10-05 RX ORDER — IBUPROFEN 600 MG/1
600 TABLET ORAL EVERY 6 HOURS PRN
COMMUNITY

## 2021-10-05 NOTE — PROGRESS NOTES
Vale Prior is a 37year old male.   Patient presents with:  Sinus Problem: pt continues to have issues breathing due to deviated septum , headaches and facial pain       HISTORY OF PRESENT ILLNESS  1/14/2019  Patient prevents for evaluation of sinusitis since quittin.4      Smokeless tobacco: Current User        Types: Chew      Tobacco comment: Wife smokes, some passive exposure.      Vaping Use      Vaping Use: Never used    Substance and Sexual Activity      Alcohol use: Not Currently        Alcoho kg)   BMI 21.56 kg/m²        Constitutional Normal Overall appearance - Normal.   Psychiatric Normal Orientation - Oriented to time, place, person & situation. Appropriate mood and affect.    Neck Exam Normal Inspection - Normal. Palpation - Normal without Oral Tab, Take 1 tablet (10 mg total) by mouth nightly. (Patient not taking: Reported on 10/5/2021), Disp: 90 tablet, Rfl: 1  •  QUEtiapine Fumarate 25 MG Oral Tab, Take 25 mg by mouth nightly.  (Patient not taking: Reported on 10/5/2021), Disp: , Rfl:   • obstruction  Combination of severely deviated septum turbinate hypertrophy and extreme valve collapse recommend septal repair turbinate reduction and nasal valve repair with Latera. I will plan on scheduling this pending the CAT scan findings.       Tk Warner

## 2021-10-09 ENCOUNTER — HOSPITAL ENCOUNTER (OUTPATIENT)
Dept: CT IMAGING | Facility: HOSPITAL | Age: 41
Discharge: HOME | End: 2021-10-09
Attending: OTOLARYNGOLOGY
Payer: COMMERCIAL

## 2021-10-09 DIAGNOSIS — J32.4 CHRONIC PANSINUSITIS: ICD-10-CM

## 2021-10-09 PROCEDURE — 70486 CT MAXILLOFACIAL W/O DYE: CPT | Performed by: OTOLARYNGOLOGY

## 2021-10-11 ENCOUNTER — NURSE ONLY (OUTPATIENT)
Dept: ALLERGY | Facility: CLINIC | Age: 41
End: 2021-10-11
Payer: COMMERCIAL

## 2021-10-11 DIAGNOSIS — J30.89 ENVIRONMENTAL AND SEASONAL ALLERGIES: ICD-10-CM

## 2021-10-11 PROCEDURE — 95117 IMMUNOTHERAPY INJECTIONS: CPT | Performed by: ALLERGY & IMMUNOLOGY

## 2021-10-14 ENCOUNTER — PATIENT MESSAGE (OUTPATIENT)
Dept: OTOLARYNGOLOGY | Facility: CLINIC | Age: 41
End: 2021-10-14

## 2021-10-15 NOTE — TELEPHONE ENCOUNTER
From: Jessica Jay  To: Duran Mcmillan MD  Sent: 10/14/2021 1:36 PM CDT  Subject: CT Scan results    Hi Dr Jasmeet Gan, one question regarding the recommendation following the CT scan: does this include correcting the deviated septum, or does the scan not taco

## 2021-10-20 ENCOUNTER — OFFICE VISIT (OUTPATIENT)
Dept: INTERNAL MEDICINE CLINIC | Facility: CLINIC | Age: 41
End: 2021-10-20
Payer: COMMERCIAL

## 2021-10-20 VITALS
WEIGHT: 172 LBS | BODY MASS INDEX: 23.3 KG/M2 | OXYGEN SATURATION: 98 % | HEIGHT: 72 IN | HEART RATE: 85 BPM | DIASTOLIC BLOOD PRESSURE: 88 MMHG | SYSTOLIC BLOOD PRESSURE: 130 MMHG

## 2021-10-20 DIAGNOSIS — Z14.8 HEMOCHROMATOSIS CARRIER: ICD-10-CM

## 2021-10-20 DIAGNOSIS — Z00.00 ANNUAL PHYSICAL EXAM: Primary | ICD-10-CM

## 2021-10-20 DIAGNOSIS — K76.0 HEPATIC STEATOSIS: ICD-10-CM

## 2021-10-20 DIAGNOSIS — Z88.9 MULTIPLE ALLERGIES: ICD-10-CM

## 2021-10-20 DIAGNOSIS — E80.6 HYPERBILIRUBINEMIA: ICD-10-CM

## 2021-10-20 DIAGNOSIS — M25.559 HIP PAIN: ICD-10-CM

## 2021-10-20 DIAGNOSIS — K21.9 GASTROESOPHAGEAL REFLUX DISEASE, UNSPECIFIED WHETHER ESOPHAGITIS PRESENT: ICD-10-CM

## 2021-10-20 PROCEDURE — 3008F BODY MASS INDEX DOCD: CPT | Performed by: INTERNAL MEDICINE

## 2021-10-20 PROCEDURE — G8483 FLU IMM NO ADMIN DOC REA: HCPCS | Performed by: INTERNAL MEDICINE

## 2021-10-20 PROCEDURE — 3079F DIAST BP 80-89 MM HG: CPT | Performed by: INTERNAL MEDICINE

## 2021-10-20 PROCEDURE — 99396 PREV VISIT EST AGE 40-64: CPT | Performed by: INTERNAL MEDICINE

## 2021-10-20 PROCEDURE — 3075F SYST BP GE 130 - 139MM HG: CPT | Performed by: INTERNAL MEDICINE

## 2021-10-20 RX ORDER — MELOXICAM 15 MG/1
15 TABLET ORAL DAILY
Qty: 7 TABLET | Refills: 0 | Status: SHIPPED | OUTPATIENT
Start: 2021-10-20 | End: 2021-10-27

## 2021-10-20 NOTE — PROGRESS NOTES
Diana Perez is a 37year old male.     Chief complaint: annual physial exam  HPI:     Diana Perez is a 37year old pleasant male who presents for annual physical exam   Right hip pain  Started PT for it 2 weeks ago   Hip pain is 3/10 when resting and 1 tablet (10 mg total) by mouth daily. (Patient not taking: No sig reported) 30 tablet 1   • Thiamine HCl 100 MG Oral Tab Take 1 tablet (100 mg total) by mouth every other day.  30 tablet 0   • Propranolol HCl 10 MG Oral Tab Take 0.5 tablets (5 mg total) by ([address]     Abnormal urinalysis     Cervical spinal stenosis     Allergic rhinitis     Closed fracture of middle or proximal phalanx or phalanges of hand     Stiffness of joint, hand     Throat pain     Cyclical vomiting with nausea     Cyclical vomiting wit April 2020. Dictated by (CST): David Mckenzie MD on 10/09/2021 at 8:55 AM     Finalized by (CST): David Mckenzie MD on 10/09/2021 at 9:09 AM                 ASSESSMENT AND PLAN:     1.  Annual physical exam  Advised to increase exercise as tolerate 0    5. Hip pain  X ray of the hip   mobic   Continue PT   consider MRI / ortho referral if persistent or worsening   - INFLUENZA REFUSED EEH  - CBC WITH DIFFERENTIAL WITH PLATELET  - COMP METABOLIC PANEL (14)  - LIPID PANEL  - IRON AND TIBC  - FERRITIN  -

## 2021-10-22 DIAGNOSIS — R17 ELEVATED BILIRUBIN: ICD-10-CM

## 2021-10-22 DIAGNOSIS — E83.52 HYPERCALCEMIA: Primary | ICD-10-CM

## 2021-10-22 DIAGNOSIS — K70.10 ALCOHOLIC HEPATITIS, UNSPECIFIED WHETHER ASCITES PRESENT: ICD-10-CM

## 2021-10-25 ENCOUNTER — HOSPITAL ENCOUNTER (OUTPATIENT)
Dept: GENERAL RADIOLOGY | Facility: HOSPITAL | Age: 41
Discharge: HOME | End: 2021-10-25
Attending: INTERNAL MEDICINE
Payer: COMMERCIAL

## 2021-10-25 DIAGNOSIS — K21.9 GASTROESOPHAGEAL REFLUX DISEASE, UNSPECIFIED WHETHER ESOPHAGITIS PRESENT: ICD-10-CM

## 2021-10-25 DIAGNOSIS — Z14.8 HEMOCHROMATOSIS CARRIER: ICD-10-CM

## 2021-10-25 DIAGNOSIS — K76.0 HEPATIC STEATOSIS: ICD-10-CM

## 2021-10-25 DIAGNOSIS — M25.559 HIP PAIN: ICD-10-CM

## 2021-10-25 DIAGNOSIS — Z88.9 MULTIPLE ALLERGIES: ICD-10-CM

## 2021-10-25 DIAGNOSIS — Z00.00 ANNUAL PHYSICAL EXAM: ICD-10-CM

## 2021-10-25 DIAGNOSIS — E80.6 HYPERBILIRUBINEMIA: ICD-10-CM

## 2021-10-25 PROCEDURE — 73502 X-RAY EXAM HIP UNI 2-3 VIEWS: CPT | Performed by: INTERNAL MEDICINE

## 2021-10-27 ENCOUNTER — TELEPHONE (OUTPATIENT)
Dept: OTOLARYNGOLOGY | Facility: CLINIC | Age: 41
End: 2021-10-27

## 2021-10-28 DIAGNOSIS — J34.89 NASAL VALVE STENOSIS: ICD-10-CM

## 2021-10-28 DIAGNOSIS — J34.2 DEVIATED NASAL SEPTUM: Primary | ICD-10-CM

## 2021-10-28 DIAGNOSIS — J34.3 HYPERTROPHY OF NASAL TURBINATES: ICD-10-CM

## 2021-10-28 NOTE — TELEPHONE ENCOUNTER
Patient scheduled for surgery on 11/17/21 at [address] [place] [address] with Dr. Marlyn Blackburn

## 2021-10-29 ENCOUNTER — IMMUNIZATION (OUTPATIENT)
Dept: LAB | Facility: HOSPITAL | Age: 41
End: 2021-10-29
Attending: EMERGENCY MEDICINE
Payer: COMMERCIAL

## 2021-10-29 DIAGNOSIS — Z23 NEED FOR VACCINATION: Primary | ICD-10-CM

## 2021-10-29 PROCEDURE — 0004A SARSCOV2 VAC 30MCG/0.3ML IM: CPT

## 2021-11-04 NOTE — TELEPHONE ENCOUNTER
Initial Clinical Review    Admission: Date/Time/Statement:   Admission Orders (From admission, onward)     Ordered        11/04/21 0023  Inpatient Admission  Once                   Orders Placed This Encounter   Procedures    Inpatient Admission     Standing Status:   Standing     Number of Occurrences:   1     Order Specific Question:   Level of Care     Answer:   Med Surg [16]     Order Specific Question:   Estimated length of stay     Answer:   More than 2 Midnights     Order Specific Question:   Certification     Answer:   I certify that inpatient services are medically necessary for this patient for a duration of greater than two midnights  See H&P and MD Progress Notes for additional information about the patient's course of treatment  ED Arrival Information     Expected Arrival Acuity    - 11/3/2021 21:46 Urgent         Means of arrival Escorted by Service Admission type    Walk-In Self Hospitalist Urgent         Arrival complaint    Abnormal Labs        Chief Complaint   Patient presents with    Abnormal Lab     pt reports PCP called him was told to come to ER for IV antibiotics for elevated Lipase  Denies any symptoms  Initial Presentation: 69  Y O male presents to ED from home at PCP recommendation after Op labs revealed  Lipase > 10,000  Developed epigastric pain after  Dinner, went to PCP,  STAT  Labs ordered and  Results abnormal    CT scan showed acute pancreatitis  Also found with elevated creatinine  PMH is  PMR  Admit  Ip with Idiopathic acute pancreatitis without infection and SHIRLENE and plan is  GI consult,  Monitor labs, pain control, IVF, NPO, bowel rest and continue home meds      Date:   11/4     Day 2:   GI consult  Acute interstitial pancreatitis - pancreatic duct stone - unclear etiology  Recommending  OP  EUS  Continue  IVF, pain control and slow diet advance  Progress note  Feels  Better since admission   Continue IVF/NPO/bowel rest      ED Triage Vitals   Temperature Singular refilled x90 days. Recommend follow-up by February 2021.   Please call to schedule Pulse Respirations Blood Pressure SpO2   11/03/21 2154 11/03/21 2154 11/03/21 2154 11/03/21 2154 11/03/21 2154   98 3 °F (36 8 °C) (!) 109 18 139/100 96 %      Temp Source Heart Rate Source Patient Position - Orthostatic VS BP Location FiO2 (%)   11/03/21 2154 11/03/21 2154 11/03/21 2154 11/03/21 2154 --   Oral Monitor Sitting Right arm       Pain Score       11/04/21 0026       No Pain          Wt Readings from Last 1 Encounters:   11/03/21 63 7 kg (140 lb 6 9 oz)     Additional Vital Signs:   97 7 °F (36 5 °C)  71  18  135/73  --  98 %  None (Room air)  Lying     11/04/21 1430  97 5 °F (36 4 °C)  77  18  135/74  --  98 %  None (Room air)  Lying   11/04/21 1057  --  71  18  149/80  --  98 %  None (Room air)  Lying   11/04/21 1030  --  --  --  --  --  --  None (Room air)  --   11/04/21 0745  --  66  18  122/69  91  99 %  None (Room air)  Lying   11/04/21 0413  98 2 °F (36 8 °C)  71  18  149/77  --  99 %  None (Room air)  Sitting   11/04/21 0026  --  78  18  129/66  --  99 %  None (Room air)  Sitting   11/03/21 2243  --  --  --  --  --  --  None (Room air)  --   11/03/21 2154  98 3 °F (36 8 °C)  109Abnormal   18  139/100  --  96 %  None (Room air)  Sitting       Pertinent Labs/Diagnostic Test Results:   Ct  Abd/pelvis  ( 11/3)    Mild to moderate diffuse peripancreatic inflammatory stranding most compatible with acute pancreatitis  Roberto Carlos Butt is a 4 mm calcified calculus at the proximal pancreatic duct with no significant pancreatic ductal dilatation  No evidence of pancreatic pseudocyst or abscess  Mild prostatomegaly         Results from last 7 days   Lab Units 11/04/21  0909 11/03/21  1725 10/29/21  1014   WBC Thousand/uL 11 51* 13 21* 6 41   HEMOGLOBIN g/dL 14 0 16 5 15 2   HEMATOCRIT % 41 3 50 1* 45 4   PLATELETS Thousands/uL 184 230 228   NEUTROS ABS Thousands/µL 8 87* 10 11* 4 02         Results from last 7 days   Lab Units 11/04/21  0909 11/03/21  2229 10/29/21  1014   SODIUM mmol/L 139 137 138   POTASSIUM mmol/L 3 7 4 0 4 0   CHLORIDE mmol/L 105 100 107   CO2 mmol/L 26 24 27   ANION GAP mmol/L 8 13 4   BUN mg/dL 17 22 15   CREATININE mg/dL 0 91 1 28 0 98   EGFR ml/min/1 73sq m 83 55 76   CALCIUM mg/dL 8 1* 9 7 9 1     Results from last 7 days   Lab Units 11/04/21  0909 11/03/21  2229 10/29/21  1014   AST U/L 13 17 18   ALT U/L 19 24 21   ALK PHOS U/L 46 65 54   TOTAL PROTEIN g/dL 6 1* 8 0 6 9   ALBUMIN g/dL 3 3* 4 4 3 7   TOTAL BILIRUBIN mg/dL 0 57 0 64 0 60         Results from last 7 days   Lab Units 11/04/21  0909 11/03/21  2229   GLUCOSE RANDOM mg/dL 102 130           Results from last 7 days   Lab Units 11/03/21  1725   CK TOTAL U/L 108     Results from last 7 days   Lab Units 11/03/21  1725   TROPONIN I ng/mL <0 02                   Results from last 7 days   Lab Units 11/04/21  0909 11/03/21  2229 11/03/21  1725   LIPASE u/L 5,201* 9,794* 10,201*     Results from last 7 days   Lab Units 10/29/21  1014   CRP mg/L <3 0   SED RATE mm/hour 2         ED Treatment:   Medication Administration from 11/03/2021 2145 to 11/04/2021 1424       Date/Time Order Dose Route Action Comments     11/04/2021 0026 sodium chloride 0 9 % bolus 1,000 mL 0 mL Intravenous Stopped      11/03/2021 2229 sodium chloride 0 9 % bolus 1,000 mL 1,000 mL Intravenous New Bag      11/03/2021 2333 iohexol (OMNIPAQUE) 350 MG/ML injection (SINGLE-DOSE) 100 mL 100 mL Intravenous Given      11/04/2021 0558 sodium chloride 0 9 % infusion 0 mL/hr Intravenous Stopped      11/04/2021 0213 sodium chloride 0 9 % infusion 200 mL/hr Intravenous Rate/Dose Change      11/04/2021 0109 sodium chloride 0 9 % infusion 125 mL/hr Intravenous New Bag      11/04/2021 0905 ascorbic acid (VITAMIN C) tablet 500 mg 500 mg Oral Not Given      11/04/2021 1253 predniSONE tablet 2 5 mg 2 5 mg Oral Given given with food     11/04/2021 0905 predniSONE tablet 2 5 mg 2 5 mg Oral Not Given      11/04/2021 0905 cholecalciferol (VITAMIN D3) tablet 1,000 Units 1,000 Units Oral Not Given      11/04/2021 1243 sodium chloride 0 9 % infusion 200 mL/hr Intravenous New Bag      11/04/2021 0601 sodium chloride 0 9 % infusion 200 mL/hr Intravenous New Bag      11/04/2021 1242 aluminum-magnesium hydroxide-simethicone (MYLANTA) oral suspension 30 mL 30 mL Oral Given      11/04/2021 1324 pantoprazole (PROTONIX) EC tablet 40 mg 40 mg Oral Given           Present on Admission:   Idiopathic acute pancreatitis without infection or necrosis   Mixed hyperlipidemia   PMR (polymyalgia rheumatica) ([facility])   Mild protein-calorie malnutrition (HCC)   Acute kidney injury ([facility] [address] )      Admitting Diagnosis: Acute pancreatitis [K85 90]  Abnormal laboratory test [R89 9]  Idiopathic acute pancreatitis without infection or necrosis [K85 00]  Age/Sex: 69 y o  male  Admission Orders:  Scheduled Medications:  ascorbic acid, 500 mg, Oral, Daily  cholecalciferol, 1,000 Units, Oral, Daily  enoxaparin, 40 mg, Subcutaneous, Q24H RAO  pantoprazole, 40 mg, Oral, Early Morning  predniSONE, 2 5 mg, Oral, Daily      Continuous IV Infusions:  sodium chloride, 200 mL/hr, Intravenous, Continuous      PRN Meds:  acetaminophen, 650 mg, Oral, Q6H PRN  aluminum-magnesium hydroxide-simethicone, 30 mL, Oral, Q6H PRN  HYDROmorphone, 0 2 mg, Intravenous, Q3H PRN  ondansetron, 4 mg, Intravenous, Q6H PRN  oxyCODONE, 5 mg, Oral, Q4H PRN  simethicone, 80 mg, Oral, 4x Daily PRN        IP CONSULT TO GASTROENTEROLOGY    Network Utilization Review Department  ATTENTION: Please call with any questions or concerns to [phone] and carefully listen to the prompts so that you are directed to the right person  All voicemails are confidential   Zacherylina Abhi all requests for admission clinical reviews, approved or denied determinations and any other requests to dedicated fax number below belonging to the campus where the patient is receiving treatment   List of dedicated fax numbers for the Facilities:  FACILITY NAME UR FAX NUMBER   ADMISSION DENIALS (Administrative/Medical Necessity) [phone]   [address] (Maternity/NICU/Pediatrics) 261 [facility],[address] [facility] [address] [place] [address]  [phone]   Marcell Brady 4045 [address] [place] [address] Francis Castellanos 1481 [address] [facility] [place] [address] [phone]

## 2021-11-08 ENCOUNTER — NURSE ONLY (OUTPATIENT)
Dept: ALLERGY | Facility: CLINIC | Age: 41
End: 2021-11-08
Payer: COMMERCIAL

## 2021-11-08 DIAGNOSIS — J30.89 ENVIRONMENTAL AND SEASONAL ALLERGIES: ICD-10-CM

## 2021-11-08 PROCEDURE — 95117 IMMUNOTHERAPY INJECTIONS: CPT | Performed by: ALLERGY & IMMUNOLOGY

## 2021-11-15 ENCOUNTER — LAB ENCOUNTER (OUTPATIENT)
Dept: LAB | Facility: HOSPITAL | Age: 41
End: 2021-11-15
Attending: OTOLARYNGOLOGY
Payer: COMMERCIAL

## 2021-11-15 DIAGNOSIS — Z01.818 PREOP TESTING: ICD-10-CM

## 2021-11-16 ENCOUNTER — TELEPHONE (OUTPATIENT)
Dept: OTOLARYNGOLOGY | Facility: CLINIC | Age: 41
End: 2021-11-16

## 2021-11-16 DIAGNOSIS — J34.2 DEVIATED NASAL SEPTUM: Primary | ICD-10-CM

## 2021-11-16 NOTE — TELEPHONE ENCOUNTER
Called precert dept and was advised that Hermann Vasquez was denied but a peer to peer can be set up by contacting [phone] with reference to case [id number]. The precert dept faxed the denial info to the wrong fax number. I have requested that it be sent again to the correct fax number. Once received I will forward it.

## 2021-11-16 NOTE — TELEPHONE ENCOUNTER
Surgery has been cancelled for 11/17/21. Per patient will wait on [employer] to approve Septoplasty [id number].

## 2021-11-16 NOTE — TELEPHONE ENCOUNTER
Pt is scheduled for surgery tomorrow - he received letter from insurance company yesterday stating they will not cover procedure - no answer on nurse line

## 2021-11-16 NOTE — TELEPHONE ENCOUNTER
Pt insurance called back, they could not schedule peer to peer to today ,advised insurance that surgery is tomorrow, per pt insurance MD may call back today to do peer to peer.

## 2021-11-24 ENCOUNTER — HOSPITAL ENCOUNTER (OUTPATIENT)
Dept: ULTRASOUND IMAGING | Facility: HOSPITAL | Age: 41
Discharge: HOME | End: 2021-11-24
Attending: INTERNAL MEDICINE
Payer: COMMERCIAL

## 2021-11-24 DIAGNOSIS — E83.52 HYPERCALCEMIA: ICD-10-CM

## 2021-11-24 DIAGNOSIS — R17 ELEVATED BILIRUBIN: ICD-10-CM

## 2021-11-24 DIAGNOSIS — K70.10 ALCOHOLIC HEPATITIS, UNSPECIFIED WHETHER ASCITES PRESENT: ICD-10-CM

## 2021-11-24 PROCEDURE — 76705 ECHO EXAM OF ABDOMEN: CPT | Performed by: INTERNAL MEDICINE

## 2021-11-24 NOTE — LETTER
Date & Time: 6/8/2023, 1:33 PM  Patient: Ken Patrick  Encounter Provider(s):    Paola Salvador PA-C       To Whom It May Concern:    Ciera Lazar was seen and treated in our department on 6/8/2023. He should not return to work until Monday, June 12. He should wear a well-fitting mask through Wednesday, June 14 when in close proximity to others .     If you have any questions or concerns, please do not hesitate to call.        _____________________________  Physician/APC Signature
Jason Otero(Attending)

## 2021-12-08 ENCOUNTER — NURSE ONLY (OUTPATIENT)
Dept: ALLERGY | Facility: CLINIC | Age: 41
End: 2021-12-08
Payer: COMMERCIAL

## 2021-12-08 ENCOUNTER — OFFICE VISIT (OUTPATIENT)
Dept: ALLERGY | Facility: CLINIC | Age: 41
End: 2021-12-08
Payer: COMMERCIAL

## 2021-12-08 VITALS
HEIGHT: 72 IN | DIASTOLIC BLOOD PRESSURE: 86 MMHG | BODY MASS INDEX: 23.18 KG/M2 | HEART RATE: 77 BPM | TEMPERATURE: 98 F | WEIGHT: 171.13 LBS | SYSTOLIC BLOOD PRESSURE: 126 MMHG

## 2021-12-08 DIAGNOSIS — Z23 FLU VACCINE NEED: ICD-10-CM

## 2021-12-08 DIAGNOSIS — Z92.29 COVID-19 VACCINE SERIES COMPLETED: ICD-10-CM

## 2021-12-08 DIAGNOSIS — J34.2 NASAL SEPTAL DEVIATION: ICD-10-CM

## 2021-12-08 DIAGNOSIS — J30.89 PERENNIAL ALLERGIC RHINITIS: Primary | ICD-10-CM

## 2021-12-08 DIAGNOSIS — J30.89 ENVIRONMENTAL AND SEASONAL ALLERGIES: ICD-10-CM

## 2021-12-08 PROCEDURE — 3074F SYST BP LT 130 MM HG: CPT | Performed by: ALLERGY & IMMUNOLOGY

## 2021-12-08 PROCEDURE — 95117 IMMUNOTHERAPY INJECTIONS: CPT | Performed by: ALLERGY & IMMUNOLOGY

## 2021-12-08 PROCEDURE — 99214 OFFICE O/P EST MOD 30 MIN: CPT | Performed by: ALLERGY & IMMUNOLOGY

## 2021-12-08 PROCEDURE — 3079F DIAST BP 80-89 MM HG: CPT | Performed by: ALLERGY & IMMUNOLOGY

## 2021-12-08 PROCEDURE — 3008F BODY MASS INDEX DOCD: CPT | Performed by: ALLERGY & IMMUNOLOGY

## 2021-12-08 RX ORDER — AZELASTINE HYDROCHLORIDE, FLUTICASONE PROPIONATE 137; 50 UG/1; UG/1
1 SPRAY, METERED NASAL 2 TIMES DAILY
Qty: 3 EACH | Refills: 1 | Status: SHIPPED | OUTPATIENT
Start: 2021-12-08

## 2021-12-08 RX ORDER — TIZANIDINE 4 MG/1
TABLET ORAL
COMMUNITY
Start: 2021-11-29

## 2021-12-08 NOTE — PROGRESS NOTES
Herminia Course is a 37year old male. HPI:   Patient presents with: Follow - Up: Noticed seasonal allergies this fall were somewhat more severe along with headaches. Present for injections.      Patient is a 81-year-old male who presents for follow-up w • INTRAOCULAR LENS INSERTION Bilateral 2012   • UPPER GI ENDOSCOPY,EXAM     • WISDOM TEETH REMOVED        Family History   Problem Relation Age of Onset   • Alcohol and Other Disorders Associated Father    • Cataracts Mother    • Anxiety Mother    • Depr escitalopram (LEXAPRO) 10 MG Oral Tab Take 1 tablet (10 mg total) by mouth daily. (Patient not taking: No sig reported) 30 tablet 1   • Propranolol HCl 10 MG Oral Tab Take 0.5 tablets (5 mg total) by mouth 3 (three) times daily.  (Patient not taking: No sig cyanosis, or clubbing     ASSESSMENT/PLAN:   Assessment   Perennial allergic rhinitis  (primary encounter diagnosis)  Environmental and seasonal allergies  Nasal septal deviation  Covid-19 vaccine series completed  Flu vaccine need    #1 allergic rhinitis

## 2021-12-08 NOTE — PATIENT INSTRUCTIONS
#1 allergic rhinitis  Recent flare of allergy symptoms over the fall in spite of current immunotherapy, Xyzal and Singulair.   No fevers or mucopurulence  Continue with maintenance dose immunotherapy to underlying environmental allergens every 4 weeks inclu

## 2021-12-09 DIAGNOSIS — J30.89 ENVIRONMENTAL AND SEASONAL ALLERGIES: ICD-10-CM

## 2021-12-09 RX ORDER — MONTELUKAST SODIUM 10 MG/1
TABLET ORAL
Qty: 90 TABLET | Refills: 0 | Status: SHIPPED | OUTPATIENT
Start: 2021-12-09

## 2021-12-09 RX ORDER — LEVOCETIRIZINE DIHYDROCHLORIDE 5 MG/1
TABLET, FILM COATED ORAL
Qty: 90 TABLET | Refills: 0 | Status: SHIPPED | OUTPATIENT
Start: 2021-12-09

## 2021-12-09 NOTE — TELEPHONE ENCOUNTER
Refill requested for    Name from pharmacy: Eloise         Will file in chart as: LEVOCETIRIZINE 5 MG Oral Tab    Sig: TAKE ONE TABLET BY MOUTH AT BEDTIME     Original sig: TAKE ONE TABLET BY MOUTH AT BEDTIME    Disp:

## 2022-01-05 ENCOUNTER — NURSE ONLY (OUTPATIENT)
Dept: ALLERGY | Facility: CLINIC | Age: 42
End: 2022-01-05
Payer: COMMERCIAL

## 2022-01-05 DIAGNOSIS — J30.89 ENVIRONMENTAL AND SEASONAL ALLERGIES: ICD-10-CM

## 2022-01-05 PROCEDURE — 95165 ANTIGEN THERAPY SERVICES: CPT | Performed by: ALLERGY & IMMUNOLOGY

## 2022-01-05 PROCEDURE — 95117 IMMUNOTHERAPY INJECTIONS: CPT | Performed by: ALLERGY & IMMUNOLOGY

## 2022-01-10 ENCOUNTER — TELEPHONE (OUTPATIENT)
Dept: OTOLARYNGOLOGY | Facility: CLINIC | Age: 42
End: 2022-01-10

## 2022-07-18 ENCOUNTER — MED REC SCAN ONLY (OUTPATIENT)
Dept: ALLERGY | Facility: CLINIC | Age: 42
End: 2022-07-18

## 2022-10-28 NOTE — PLAN OF CARE
Mom called on call Problem: Patient/Family Goals  Goal: Patient/Family Long Term Goal  Description  Patient's Long Term Goal: To stay away from alcohol.     Interventions:  - monitor vs, alcohol withdrawal symptoms  -monitor labs, test result  -seizure precautions  - or PO as ordered and tolerated  - Evaluate effectiveness of GI medications  - Encourage mobilization and activity  - Obtain nutritional consult as needed  - Establish a toileting routine/schedule  - Consider collaborating with pharmacy to review patient's concerns and demonstrate effective coping strategies  Description  INTERVENTIONS:  - Assist patient/family to identify coping skills, available support systems and cultural and spiritual values  - Provide emotional support, including active listening and a possible  - Assess the patient's physical comfort, circulation, skin condition, hydration, nutrition and elimination needs   - Reorient and redirection as needed  - Assess for the need to continue restraints  Outcome: Angelika Menjivar is alert and or

## 2023-06-03 ENCOUNTER — HOSPITAL ENCOUNTER (OUTPATIENT)
Age: 43
Discharge: HOME | End: 2023-06-03
Payer: COMMERCIAL

## 2023-06-03 ENCOUNTER — APPOINTMENT (OUTPATIENT)
Dept: ULTRASOUND IMAGING | Age: 43
End: 2023-06-03
Attending: PHYSICIAN ASSISTANT
Payer: COMMERCIAL

## 2023-06-03 VITALS
OXYGEN SATURATION: 99 % | TEMPERATURE: 97 F | HEART RATE: 89 BPM | DIASTOLIC BLOOD PRESSURE: 72 MMHG | RESPIRATION RATE: 20 BRPM | SYSTOLIC BLOOD PRESSURE: 118 MMHG

## 2023-06-03 DIAGNOSIS — N43.3 HYDROCELE, UNSPECIFIED HYDROCELE TYPE: Primary | ICD-10-CM

## 2023-06-03 PROCEDURE — 99214 OFFICE O/P EST MOD 30 MIN: CPT

## 2023-06-03 PROCEDURE — 99213 OFFICE O/P EST LOW 20 MIN: CPT

## 2023-06-03 PROCEDURE — 76870 US EXAM SCROTUM: CPT | Performed by: PHYSICIAN ASSISTANT

## 2023-06-03 PROCEDURE — 93975 VASCULAR STUDY: CPT | Performed by: PHYSICIAN ASSISTANT

## 2023-06-03 NOTE — ED INITIAL ASSESSMENT (HPI)
Patient reports left testicular swelling x 1 week. States his left testicle is approximately 3-4 times the size it normally is. Denies trauma injury. States he recently drove across country and was in the car for many hours several days in a row so he is unsure if he may have sat on it. Denies difficulty urinating.

## 2023-06-08 ENCOUNTER — HOSPITAL ENCOUNTER (OUTPATIENT)
Age: 43
Discharge: HOME | End: 2023-06-08
Payer: COMMERCIAL

## 2023-06-08 VITALS
RESPIRATION RATE: 14 BRPM | HEART RATE: 105 BPM | WEIGHT: 155 LBS | DIASTOLIC BLOOD PRESSURE: 91 MMHG | OXYGEN SATURATION: 98 % | HEIGHT: 72 IN | TEMPERATURE: 100 F | BODY MASS INDEX: 20.99 KG/M2 | SYSTOLIC BLOOD PRESSURE: 135 MMHG

## 2023-06-08 DIAGNOSIS — R05.9 COUGH: Primary | ICD-10-CM

## 2023-06-08 DIAGNOSIS — U07.1 COVID-19: ICD-10-CM

## 2023-06-08 LAB
S PYO AG THROAT QL: NEGATIVE
SARS-COV-2 RNA RESP QL NAA+PROBE: DETECTED

## 2023-06-08 PROCEDURE — U0002 COVID-19 LAB TEST NON-CDC: HCPCS | Performed by: PHYSICIAN ASSISTANT

## 2023-06-08 PROCEDURE — 87880 STREP A ASSAY W/OPTIC: CPT | Performed by: PHYSICIAN ASSISTANT

## 2023-06-08 PROCEDURE — 99213 OFFICE O/P EST LOW 20 MIN: CPT | Performed by: PHYSICIAN ASSISTANT

## 2023-06-08 RX ORDER — BUSPIRONE HYDROCHLORIDE 10 MG/1
TABLET ORAL
COMMUNITY
Start: 2023-05-15

## 2023-06-08 RX ORDER — ESCITALOPRAM OXALATE 5 MG/1
TABLET ORAL
COMMUNITY
Start: 2023-05-15

## 2023-06-08 NOTE — DISCHARGE INSTRUCTIONS
Please return to the Emergency department/clinic if symptoms worsen or you develop new symptoms. Follow up with your primary care physician in 2 days. Take any medications prescribed to you as instructed. You tested positive for COVID-19 today. You should remain in self isolation for 5 days from symptom onset, or until you are fever free for 24 hours, whichever is longer. Wear a well fitting mask for 5 additional days after your initial self-isolation, when you are in close contact with other people. Treat your symptoms as needed with over-the-counter medication such as cold medicine, ibuprofen, or Tylenol. If you develop chest pain, shortness or breath or believe your symptoms to require medical intervention for any reason, please go directly to the emergency department.

## 2023-06-08 NOTE — ED INITIAL ASSESSMENT (HPI)
Patient reports sore throat starting on Sunday, progressing since then and hoarse voice today. Reports slight sinus pressure and drainage, slight cough. Reports bodyaches and muscle cramping. Unsure of fever.

## 2023-06-11 ENCOUNTER — TELEPHONE (OUTPATIENT)
Dept: SURGERY | Facility: CLINIC | Age: 43
End: 2023-06-11

## 2023-06-11 NOTE — TELEPHONE ENCOUNTER
Hi all   Please set him up to see me (if he wishes) to discuss his scrotal ultrasound that he had done in the ER. Ok to set up in 1-2 weeks since he recently had covid.   AR

## 2023-06-12 NOTE — TELEPHONE ENCOUNTER
S/W pt and informed him of AR's msg as stated below and we arranged an appt for him for Monday 6/19 at 3 pm.

## 2023-06-19 ENCOUNTER — TELEPHONE (OUTPATIENT)
Dept: SURGERY | Facility: CLINIC | Age: 43
End: 2023-06-19

## 2023-06-19 ENCOUNTER — OFFICE VISIT (OUTPATIENT)
Dept: SURGERY | Facility: CLINIC | Age: 43
End: 2023-06-19

## 2023-06-19 VITALS — DIASTOLIC BLOOD PRESSURE: 90 MMHG | SYSTOLIC BLOOD PRESSURE: 139 MMHG | HEART RATE: 69 BPM

## 2023-06-19 DIAGNOSIS — N43.3 HYDROCELE, UNSPECIFIED HYDROCELE TYPE: Primary | ICD-10-CM

## 2023-06-19 NOTE — TELEPHONE ENCOUNTER
Hi!  This patient needs a hydrocele. Should be booked for 1 hours. Needs labs as ordered below. Thanks! [facility]    Urology Surgery Scheduling Request    Location: [address] [place] [address] OR    Surgeon: Eryn Jacques MD    Asst. Surgeon: N/A    Diagnosis: hydrocele    Procedure: left hydrocelectomy    Anesthesia: General     Time Frame: 1-12 weeks    Time needed: 1 hours    Special Equipment: Basic kit    On Call to OR: Ancef    Admission: Day Surgery    Pre-op Testing: CBC, CMP, PT/INR     Need Pre-op Clearance: none    Estimated Post Op/Follow Up Appt: tbd.     Adrianna Renteria MD

## 2023-07-03 ENCOUNTER — LAB ENCOUNTER (OUTPATIENT)
Dept: LAB | Facility: HOSPITAL | Age: 43
End: 2023-07-03
Attending: UROLOGY
Payer: COMMERCIAL

## 2023-07-03 LAB
ANION GAP SERPL CALC-SCNC: 2 MMOL/L (ref 0–18)
BUN BLD-MCNC: 12 MG/DL (ref 7–18)
BUN/CREAT SERPL: 11.3 (ref 10–20)
CALCIUM BLD-MCNC: 9.4 MG/DL (ref 8.5–10.1)
CHLORIDE SERPL-SCNC: 108 MMOL/L (ref 98–112)
CO2 SERPL-SCNC: 28 MMOL/L (ref 21–32)
CREAT BLD-MCNC: 1.06 MG/DL
DEPRECATED RDW RBC AUTO: 45.1 FL (ref 35.1–46.3)
ERYTHROCYTE [DISTWIDTH] IN BLOOD BY AUTOMATED COUNT: 13.2 % (ref 11–15)
FASTING STATUS PATIENT QL REPORTED: NO
GFR SERPLBLD BASED ON 1.73 SQ M-ARVRAT: 90 ML/MIN/1.73M2 (ref 60–?)
GLUCOSE BLD-MCNC: 85 MG/DL (ref 70–99)
HCT VFR BLD AUTO: 44.6 %
HGB BLD-MCNC: 14.2 G/DL
INR BLD: 0.94 (ref 0.85–1.16)
MCH RBC QN AUTO: 29.5 PG (ref 26–34)
MCHC RBC AUTO-ENTMCNC: 31.8 G/DL (ref 31–37)
MCV RBC AUTO: 92.7 FL
OSMOLALITY SERPL CALC.SUM OF ELEC: 285 MOSM/KG (ref 275–295)
PLATELET # BLD AUTO: 225 10(3)UL (ref 150–450)
POTASSIUM SERPL-SCNC: 5.2 MMOL/L (ref 3.5–5.1)
PROTHROMBIN TIME: 12.6 SECONDS (ref 11.6–14.8)
RBC # BLD AUTO: 4.81 X10(6)UL
SODIUM SERPL-SCNC: 138 MMOL/L (ref 136–145)
WBC # BLD AUTO: 5.5 X10(3) UL (ref 4–11)

## 2023-07-03 PROCEDURE — 85027 COMPLETE CBC AUTOMATED: CPT | Performed by: UROLOGY

## 2023-07-03 PROCEDURE — 36415 COLL VENOUS BLD VENIPUNCTURE: CPT | Performed by: UROLOGY

## 2023-07-03 PROCEDURE — 85610 PROTHROMBIN TIME: CPT | Performed by: UROLOGY

## 2023-07-03 PROCEDURE — 80048 BASIC METABOLIC PNL TOTAL CA: CPT | Performed by: UROLOGY

## 2023-07-03 PROCEDURE — 87086 URINE CULTURE/COLONY COUNT: CPT | Performed by: UROLOGY

## 2023-07-03 RX ORDER — LEVOCETIRIZINE DIHYDROCHLORIDE 5 MG/1
5 TABLET, FILM COATED ORAL EVERY EVENING
COMMUNITY

## 2023-07-05 RX ORDER — CEFAZOLIN SODIUM/WATER 2 G/20 ML
2 SYRINGE (ML) INTRAVENOUS ONCE
Status: COMPLETED | OUTPATIENT
Start: 2023-07-05 | End: 2023-07-06

## 2023-07-05 NOTE — H&P
PRE-OP NOTE     NAME/MRN/PROCEDURE: Ciera Lazar [id number] - left hydrocelectomy  HPI: 43M with left hydrocele counseled on hydrocelectomy.   PMH: Rhinitis, arthritis, back problem, cirrhosis, depression, hemochromatosis, high cholesterol, osteoarthritis, pneumonia  PSH: Dental procedure, eye surgery, intraocular lens insertion, upper endoscopy, wisdom tooth extraction  MEDS: Cefazolin, buspirone, escitalopram, motelukast, azelastine-fluticasone, levocetirizine, pseudoephedrine  ALL: Pollen, Dander  LABS: Ucx negative, INR 0.94, Cr 1.06, Hmct 44.6  IMAGING: MARIANELA with large left hydrocele  OTHER:   CONSTITUTIONAL: No apparent distress, cooperative and communicative  NEUROLOGIC: Alert and oriented   HEAD: Normocephalic, atraumatic   EYES: Sclera non-icteric   ENT: Hearing intact, moist mucous membranes   NECK: No obvious goiter or masses   RESPIRATORY: Normal respiratory effort, Nonlabored breathing on room air  SKIN: No evident rashes   ABDOMEN: Soft, nontender, nondistended, no rebound tenderness, no guarding, no masses  GENITOURINARY: Normal phallus, orthotopic meatus, no glans or shaft lesions; left hemiscrotum with tense hydrocele without ability to palpate the testicle; right testicle palpable without any masses or pain to palpation  ABX: Ancef

## 2023-07-06 ENCOUNTER — TELEPHONE (OUTPATIENT)
Dept: SURGERY | Facility: CLINIC | Age: 43
End: 2023-07-06

## 2023-07-06 ENCOUNTER — ANESTHESIA (OUTPATIENT)
Dept: SURGERY | Facility: HOSPITAL | Age: 43
End: 2023-07-06
Payer: COMMERCIAL

## 2023-07-06 ENCOUNTER — HOSPITAL ENCOUNTER (OUTPATIENT)
Facility: HOSPITAL | Age: 43
Setting detail: HOSPITAL OUTPATIENT SURGERY
Discharge: HOME | End: 2023-07-06
Attending: UROLOGY | Admitting: UROLOGY
Payer: COMMERCIAL

## 2023-07-06 ENCOUNTER — ANESTHESIA EVENT (OUTPATIENT)
Dept: SURGERY | Facility: HOSPITAL | Age: 43
End: 2023-07-06
Payer: COMMERCIAL

## 2023-07-06 VITALS
OXYGEN SATURATION: 100 % | TEMPERATURE: 97 F | DIASTOLIC BLOOD PRESSURE: 87 MMHG | HEIGHT: 72 IN | SYSTOLIC BLOOD PRESSURE: 131 MMHG | RESPIRATION RATE: 16 BRPM | HEART RATE: 89 BPM | BODY MASS INDEX: 21.4 KG/M2 | WEIGHT: 158 LBS

## 2023-07-06 DIAGNOSIS — N43.2 OTHER HYDROCELE: ICD-10-CM

## 2023-07-06 PROCEDURE — 0VBG0ZZ EXCISION OF LEFT SPERMATIC CORD, OPEN APPROACH: ICD-10-PCS | Performed by: UROLOGY

## 2023-07-06 PROCEDURE — 55040 REMOVAL OF HYDROCELE: CPT | Performed by: UROLOGY

## 2023-07-06 RX ORDER — SODIUM CHLORIDE, SODIUM LACTATE, POTASSIUM CHLORIDE, CALCIUM CHLORIDE 600; 310; 30; 20 MG/100ML; MG/100ML; MG/100ML; MG/100ML
INJECTION, SOLUTION INTRAVENOUS CONTINUOUS
Status: DISCONTINUED | OUTPATIENT
Start: 2023-07-06 | End: 2023-07-06

## 2023-07-06 RX ORDER — DOCUSATE SODIUM 100 MG/1
100 CAPSULE, LIQUID FILLED ORAL 2 TIMES DAILY
Qty: 30 CAPSULE | Refills: 0 | Status: SHIPPED | OUTPATIENT
Start: 2023-07-06

## 2023-07-06 RX ORDER — HYDROMORPHONE HYDROCHLORIDE 1 MG/ML
0.4 INJECTION, SOLUTION INTRAMUSCULAR; INTRAVENOUS; SUBCUTANEOUS EVERY 5 MIN PRN
Status: DISCONTINUED | OUTPATIENT
Start: 2023-07-06 | End: 2023-07-06

## 2023-07-06 RX ORDER — GLYCOPYRROLATE 0.2 MG/ML
INJECTION, SOLUTION INTRAMUSCULAR; INTRAVENOUS AS NEEDED
Status: DISCONTINUED | OUTPATIENT
Start: 2023-07-06 | End: 2023-07-06 | Stop reason: SURG

## 2023-07-06 RX ORDER — DEXAMETHASONE SODIUM PHOSPHATE 4 MG/ML
VIAL (ML) INJECTION AS NEEDED
Status: DISCONTINUED | OUTPATIENT
Start: 2023-07-06 | End: 2023-07-06 | Stop reason: SURG

## 2023-07-06 RX ORDER — EPHEDRINE SULFATE 50 MG/ML
INJECTION INTRAVENOUS AS NEEDED
Status: DISCONTINUED | OUTPATIENT
Start: 2023-07-06 | End: 2023-07-06 | Stop reason: SURG

## 2023-07-06 RX ORDER — MORPHINE SULFATE 10 MG/ML
6 INJECTION, SOLUTION INTRAMUSCULAR; INTRAVENOUS EVERY 10 MIN PRN
Status: DISCONTINUED | OUTPATIENT
Start: 2023-07-06 | End: 2023-07-06

## 2023-07-06 RX ORDER — MIDAZOLAM HYDROCHLORIDE 1 MG/ML
INJECTION INTRAMUSCULAR; INTRAVENOUS AS NEEDED
Status: DISCONTINUED | OUTPATIENT
Start: 2023-07-06 | End: 2023-07-06 | Stop reason: SURG

## 2023-07-06 RX ORDER — OXYCODONE HYDROCHLORIDE 5 MG/1
5 TABLET ORAL EVERY 4 HOURS PRN
Qty: 5 TABLET | Refills: 0 | Status: SHIPPED | OUTPATIENT
Start: 2023-07-06

## 2023-07-06 RX ORDER — BUPIVACAINE HYDROCHLORIDE 2.5 MG/ML
INJECTION, SOLUTION EPIDURAL; INFILTRATION; INTRACAUDAL AS NEEDED
Status: DISCONTINUED | OUTPATIENT
Start: 2023-07-06 | End: 2023-07-06 | Stop reason: HOSPADM

## 2023-07-06 RX ORDER — MORPHINE SULFATE 4 MG/ML
2 INJECTION, SOLUTION INTRAMUSCULAR; INTRAVENOUS EVERY 10 MIN PRN
Status: DISCONTINUED | OUTPATIENT
Start: 2023-07-06 | End: 2023-07-06

## 2023-07-06 RX ORDER — MORPHINE SULFATE 4 MG/ML
4 INJECTION, SOLUTION INTRAMUSCULAR; INTRAVENOUS EVERY 10 MIN PRN
Status: DISCONTINUED | OUTPATIENT
Start: 2023-07-06 | End: 2023-07-06

## 2023-07-06 RX ORDER — MEPERIDINE HYDROCHLORIDE 25 MG/ML
12.5 INJECTION INTRAMUSCULAR; INTRAVENOUS; SUBCUTANEOUS ONCE
Status: COMPLETED | OUTPATIENT
Start: 2023-07-06 | End: 2023-07-06

## 2023-07-06 RX ORDER — LIDOCAINE HYDROCHLORIDE 10 MG/ML
INJECTION, SOLUTION EPIDURAL; INFILTRATION; INTRACAUDAL; PERINEURAL AS NEEDED
Status: DISCONTINUED | OUTPATIENT
Start: 2023-07-06 | End: 2023-07-06 | Stop reason: SURG

## 2023-07-06 RX ORDER — NALOXONE HYDROCHLORIDE 4 MG/.1ML
4 SPRAY NASAL AS NEEDED
Qty: 1 KIT | Refills: 0 | Status: SHIPPED | OUTPATIENT
Start: 2023-07-06

## 2023-07-06 RX ORDER — NALOXONE HYDROCHLORIDE 0.4 MG/ML
80 INJECTION, SOLUTION INTRAMUSCULAR; INTRAVENOUS; SUBCUTANEOUS AS NEEDED
Status: DISCONTINUED | OUTPATIENT
Start: 2023-07-06 | End: 2023-07-06

## 2023-07-06 RX ORDER — KETOROLAC TROMETHAMINE 30 MG/ML
INJECTION, SOLUTION INTRAMUSCULAR; INTRAVENOUS AS NEEDED
Status: DISCONTINUED | OUTPATIENT
Start: 2023-07-06 | End: 2023-07-06 | Stop reason: SURG

## 2023-07-06 RX ORDER — ONDANSETRON 2 MG/ML
INJECTION INTRAMUSCULAR; INTRAVENOUS AS NEEDED
Status: DISCONTINUED | OUTPATIENT
Start: 2023-07-06 | End: 2023-07-06 | Stop reason: SURG

## 2023-07-06 RX ORDER — HYDROMORPHONE HYDROCHLORIDE 1 MG/ML
0.2 INJECTION, SOLUTION INTRAMUSCULAR; INTRAVENOUS; SUBCUTANEOUS EVERY 5 MIN PRN
Status: DISCONTINUED | OUTPATIENT
Start: 2023-07-06 | End: 2023-07-06

## 2023-07-06 RX ORDER — HYDROMORPHONE HYDROCHLORIDE 1 MG/ML
0.6 INJECTION, SOLUTION INTRAMUSCULAR; INTRAVENOUS; SUBCUTANEOUS EVERY 5 MIN PRN
Status: DISCONTINUED | OUTPATIENT
Start: 2023-07-06 | End: 2023-07-06

## 2023-07-06 RX ORDER — ACETAMINOPHEN 500 MG
1000 TABLET ORAL ONCE
Status: COMPLETED | OUTPATIENT
Start: 2023-07-06 | End: 2023-07-06

## 2023-07-06 RX ADMIN — CEFAZOLIN SODIUM/WATER: 2 G/20 ML SYRINGE (ML) INTRAVENOUS at 15:20:00

## 2023-07-06 RX ADMIN — KETOROLAC TROMETHAMINE 30 MG: 30 INJECTION, SOLUTION INTRAMUSCULAR; INTRAVENOUS at 14:11:00

## 2023-07-06 RX ADMIN — SODIUM CHLORIDE, SODIUM LACTATE, POTASSIUM CHLORIDE, CALCIUM CHLORIDE: 600; 310; 30; 20 INJECTION, SOLUTION INTRAVENOUS at 14:06:00

## 2023-07-06 RX ADMIN — ONDANSETRON 4 MG: 2 INJECTION INTRAMUSCULAR; INTRAVENOUS at 14:06:00

## 2023-07-06 RX ADMIN — GLYCOPYRROLATE 0.2 MG: 0.2 INJECTION, SOLUTION INTRAMUSCULAR; INTRAVENOUS at 13:46:00

## 2023-07-06 RX ADMIN — CEFAZOLIN SODIUM/WATER 2 G: 2 G/20 ML SYRINGE (ML) INTRAVENOUS at 13:08:00

## 2023-07-06 RX ADMIN — GLYCOPYRROLATE 0.2 MG: 0.2 INJECTION, SOLUTION INTRAMUSCULAR; INTRAVENOUS at 13:42:00

## 2023-07-06 RX ADMIN — DEXAMETHASONE SODIUM PHOSPHATE 4 MG: 4 MG/ML VIAL (ML) INJECTION at 14:06:00

## 2023-07-06 RX ADMIN — MIDAZOLAM HYDROCHLORIDE 2 MG: 1 INJECTION INTRAMUSCULAR; INTRAVENOUS at 13:11:00

## 2023-07-06 RX ADMIN — LIDOCAINE HYDROCHLORIDE 100 MG: 10 INJECTION, SOLUTION EPIDURAL; INFILTRATION; INTRACAUDAL; PERINEURAL at 13:15:00

## 2023-07-06 RX ADMIN — SODIUM CHLORIDE, SODIUM LACTATE, POTASSIUM CHLORIDE, CALCIUM CHLORIDE: 600; 310; 30; 20 INJECTION, SOLUTION INTRAVENOUS at 15:20:00

## 2023-07-06 RX ADMIN — EPHEDRINE SULFATE 10 MG: 50 INJECTION INTRAVENOUS at 13:59:00

## 2023-07-06 NOTE — ANESTHESIA POSTPROCEDURE EVALUATION
Patient: Lang Goldberg    Procedure Summary       Date: 07/06/23 Room / Location: [address] [place] [address] MAIN OR 14 / [address] [place] [address] MAIN OR    Anesthesia Start: 5565 Anesthesia Stop:     Procedure: Left hydrocelectomy (Left: Scrotum) Diagnosis:       Other hydrocele      (Other hydrocele [N43.2])    Surgeons: Kristofer Arenas MD Anesthesiologist: Maegan Farah MD    Anesthesia Type: general ASA Status: 2            Anesthesia Type: No value filed. Vitals Value Taken Time   /89 07/06/23 1511   Temp 97 07/06/23 1514   Pulse 92 07/06/23 1514   Resp 11 07/06/23 1514   SpO2 100 % 07/06/23 1514   Vitals shown include unvalidated device data.     [address] [place] [address] AN Post Evaluation:   Patient Evaluated in PACU  Patient Participation: complete - patient cannot participate  Level of Consciousness: awake  Pain Score: 0  Airway Patency:patent  Dental exam unchanged from preop  Yes    Respiratory Status: acceptable  Postoperative Hydration acceptable      Emiliano Yoon MD  7/6/2023 3:14 PM

## 2023-07-06 NOTE — OPERATIVE REPORT
OPERATIVE REPORT  DATE OF SURGERY: 7/6/2023  PREOPERATIVE DIAGNOSIS: left hydrocele  POSTOPERATIVE DIAGNOSIS: left hydrocele  PROCEDURE: Hydrocelectomy, left  SURGEON: Viral Moses MD  ASSISTANT: none  ANESTHESIA: general  FLUIDS: per anesthesia  URINE OUTPUT: n/a  ESTIMATED BLOOD LOSS: 10cc  SPECIMENS: left hydrocele sac  CONDITION: Stable to PACU  INDICATIONS: 43M with left hydrocele counseled on hydrocelectomy. See clinic note for details and risk counseling. PROCEDURE:  The patient was correctly identified in the preoperative holding area. He was brought back to the operative room and placed on the table in the supine position. He was given intravenous cefazolin as preoperative antibiotic prophylaxis. A universal timeout was performed. Venodyne boots were placed. All team members were identified. He was then given general anesthesia by the anesthesia team.  Following this he was prepped and draped in a standard sterile fashion. An approximately 6 cm transverse incision was made in the left hemiscrotum. This incision was then brought down through the various layers of the scrotum until the hydrocele sac was reached. The hydrocele sac was then manually dissected out from the scrotal wall and the hydrocele was delivered into the field. The hydrocele sac was then gently cleaned using a sponge in order to remove all cremasteric fibers. Following this the hydrocele sac was then incised using scalpel and Bovie electrocautery and drained of approximately 200 milliliters of straw-colored fluid. The hydrocele sac was then resected from the testis and spermatic cord using Bovie electrocautery. The edges of the hydrocele sac resection were gently painted using Bovie electrocautery in order to achieve hemostasis. The sac edge was then oversewn using 3-0 Vicryl in a running fashion all the way around the resected edge. Hemostasis was excellent.   Following this the dartos fascia was thoroughly examined for bleeding vessels and meticulous hemostasis obtained. The dartos fascia was then irrigated and the testis was placed back in the normal anatomic position in the right hemiscrotum. The dartos fascia was then closed using a running 3-0 chromic stitch. The skin was closed using 3-0 chromic suture in a simple horizontal mattress fashion. A drain was placed prior to closing and was secured with a nylon suture. The patient was cleaned and dried. Dressing was applied using Dermabond and fluffs and a scrotal supporter. He was then reversed from anesthesia, [facility] and brought back to the PACU in stable condition.   There were no complications during this case    IMPRESSION: Status post hydrocelectomy with drain in place  PLAN:  Drain to remain in place for 7 days  Return to clinic for drain removal  Pain medications provided, stool softeners as well  Return to clinic in 1 month for follow-up

## 2023-07-06 NOTE — TELEPHONE ENCOUNTER
Audie neal you set him up for drain removal in the clinic in 7 days also an appt with me in 1 month  Thanks  ar

## 2023-07-06 NOTE — ANESTHESIA PROCEDURE NOTES
Airway  Date/Time: 7/6/2023 1:28 PM  Urgency: elective    Airway not difficult    General Information and Staff    Patient location during procedure: OR  Anesthesiologist: Chris Rowe MD  Performed: anesthesiologist   Performed by: Chris Rowe MD  Authorized by: Chris Rowe MD      Indications and Patient Condition  Indications for airway management: anesthesia  Spontaneous ventilation: present  Sedation level: deep  Preoxygenated: yes  Patient position: sniffing  Mask difficulty assessment: 1 - vent by mask    Final Airway Details  Final airway type: supraglottic airway      Successful airway: unique  Size 4       Number of attempts at approach: 3 or more  Ventilation between attempts: BVM  Number of other approaches attempted: 3 or more    Other Attempts  Unsuccessful attempted airways: bag katie mask      Additional Comments  Very difficult to open his mouth adequately to insert #4 LMA. Required deep inhalation anesthesia following 250 mg propofol to get jaw to relax.

## 2023-07-13 ENCOUNTER — NURSE ONLY (OUTPATIENT)
Dept: SURGERY | Facility: CLINIC | Age: 43
End: 2023-07-13

## 2023-07-13 VITALS — RESPIRATION RATE: 16 BRPM | SYSTOLIC BLOOD PRESSURE: 116 MMHG | DIASTOLIC BLOOD PRESSURE: 78 MMHG | HEART RATE: 100 BPM

## 2023-07-13 DIAGNOSIS — N43.3 HYDROCELE, LEFT: Primary | ICD-10-CM

## 2023-07-13 NOTE — PROGRESS NOTES
I called pt into the exam room and introduced myself and verified the pt's name and . I explained that I will be removing his scrotal drain and he agreed to proceed. I asked the pt to lower his bottom clothing to his ankles and I assisted him to position on his Rt side on the exam table to be able to access the scrotal area where the drain was on the Lt posterior side of the scrotum. I then snipped the single suture with scissors and then gently removed the drain and then applied several guaze 4x4 's and assisted pt to pull up his athletic supporter over them to help secure them in place. I then assisted pt off the exam table and he re-dressed. I asked him to make a 1 mo f/u and told him to call if he had any issues.

## 2023-08-07 ENCOUNTER — OFFICE VISIT (OUTPATIENT)
Dept: SURGERY | Facility: CLINIC | Age: 43
End: 2023-08-07

## 2023-08-07 DIAGNOSIS — N43.3 HYDROCELE, LEFT: Primary | ICD-10-CM

## 2023-08-07 NOTE — PROGRESS NOTES
Tenisha Peng MD  Department of Urology  [facility], [place]    T: [phone]  F: [phone]    To: Jovani Menon, [address] [id number]    Re: Royce Sanchez   MRN: [id number]  : 1980    Dear Jovani Menon MD,    Today I had the pleasure of seeing Royce Sanchez in my clinic. As you know, Mr. Adeel Layton is a pleasant 44year old year old male who I am seeing for follow up of hydrocelectomy. Patient was last seen in this department on 2023. Briefly, patient underwent hydrocelectomy me 2023. He has done well postoperatively. He reports that he had some swelling after the procedure but it is now subsiding. Please note that his pathology demonstrated chronic inflammation.        PAST MEDICAL HISTORY:  Past Medical History:   Diagnosis Date    Allergic rhinitis     Anesthesia complication     woke up during procedure    Anxiety state     Arthritis     Back pain     Back problem     C. difficile diarrhea 2018    Cirrhosis of liver ([address]     6222    Cyclical vomiting     Depression     Hemochromatosis     Hepatitis     High cholesterol     Hx of motion sickness     sMALL BOATS    Hyperbilirubinemia     Migraines     Osteoarthritis     Pneumonia due to organism         PAST SURGICAL HISTORY:  Past Surgical History:   Procedure Laterality Date    DENTAL SURGERY PROCEDURE      EGD      EYE SURGERY  2012    ICL    INTRAOCULAR LENS INSERTION Bilateral 2012    SINUS SURGERY        septoplasty    UPPER GI ENDOSCOPY,EXAM      WISDOM TEETH REMOVED           ALLERGIES:    Pollen                  OTHER (SEE COMMENTS)    Comment:Sneezing and runny nose  Dander                  ITCHING, OTHER (SEE COMMENTS)    Comment:Sneezing with cats and dogs      MEDICATIONS:  Current Outpatient Medications   Medication Instructions    Azelastine-Fluticasone (DYMISTA) 137-50 MCG/ACT Nasal Suspension 1 Squirt, Nasal, 2 times daily    busPIRone 10 MG Oral Tab No dose, route, or frequency recorded. docusate sodium (COLACE) 100 mg, Oral, 2 times daily    escitalopram (LEXAPRO) 10 mg, Oral, Daily    escitalopram 5 MG Oral Tab No dose, route, or frequency recorded. levocetirizine (XYZAL) 5 mg, Oral, Every evening    MONTELUKAST 10 MG Oral Tab Take 1 tablet  by mouth nightly. Naloxone HCl 4 mg, Nasal, As needed, If patient remains unresponsive, repeat dose in other nostril 2-5 minutes after first dose. oxyCODONE 5 mg, Oral, Every 4 hours PRN    Pseudoeph-Doxylamine-DM-APAP (NYQUIL OR) Oral, Nightly        FAMILY HISTORY:  Family History   Problem Relation Age of Onset    Alcohol and Other Disorders Associated Father     Cataracts Mother     Anxiety Mother     Depression Mother     Cancer Other 68        maternal great uncle; unknown etiology    Bleeding Disorders Neg     Clotting Disorder Neg     Anemia Neg         SOCIAL HISTORY:  Social History     Socioeconomic History    Marital status:    Tobacco Use    Smoking status: Former     Types: Cigars     Quit date: 2009     Years since quittin.2    Smokeless tobacco: Current     Types: Chew    Tobacco comments:     Wife smokes, some passive exposure. ; chews tobacco daily   Vaping Use    Vaping Use: Never used   Substance and Sexual Activity    Alcohol use: Not Currently     Alcohol/week: 2.5 standard drinks of alcohol     Types: 3 Standard drinks or equivalent per week     Comment: 1/2 fifth vodka daily over 10 years    Drug use: Not Currently   Other Topics Concern    Caffeine Concern Yes     Comment: 1-2 cup of coffee a day    Exercise No   Social History Narrative    Olvin is  x 2yrs. Father of 2 step children. He works as a  in [employer]. He and his family live here in [address]:  There were no vitals filed for this visit.   CONSTITUTIONAL: No apparent distress, cooperative and communicative  NEUROLOGIC: Alert and oriented   HEAD: Normocephalic, atraumatic   EYES: Sclera non-icteric   ENT: Hearing intact, moist mucous membranes   NECK: No obvious goiter or masses   RESPIRATORY: Normal respiratory effort, Nonlabored breathing on room air  SKIN: No evident rashes   ABDOMEN: Soft, nontender, nondistended, no rebound tenderness, no guarding, no masses  GENITOURINARY: Mild tense swelling on the left hemiscrotum which patient denies any discomfort with palpation; difficult to palpate the testicle completely however no obvious masses    REVIEW OF SYSTEMS:    A comprehensive 10-point review of systems was completed. Pertinent positives and negatives are noted in the the HPI. LABORATORY DATA:  Left hydrocele sac; left hydrocelectomy:   Two portions of hydrocele sac with vascular congestion and mild chronic inflammation. IMAGING REVIEW:  Narrative   PROCEDURE: US SCROTUM W/DOPPLER (CPT=93975/81186)     COMPARISON: None. INDICATIONS: Left testicular swelling. TECHNIQUE: The scrotum was evaluated with gray scale and color duplex Doppler sonography. FINDINGS:     RIGHT  TESTICLE: 5.1 x 2.7 x 2.9 cm. Normal echogenicity. No masses. EPIDIDYMIS: Normal size and echogenicity. OTHER: There is a large anechoic hydrocele measuring 7.7 x 4.8 x 5.6 cm. No varicocele. DOPPLER: Normal arterial and venous flow in the testicle with color and pulsed Doppler. Normal epididymal flow. LEFT  TESTICLE: 4.1 x 2.2 x 2.7 cm. Normal echogenicity. No masses. EPIDIDYMIS: Normal size and echogenicity. OTHER: Negative. No varicocele or hydrocele. DOPPLER: Normal arterial and venous flow in the testicle with color pulsed Doppler. Normal epididymal flow. Impression   CONCLUSION:  1. Large simple right hydrocele, which is nonspecific but is often idiopathic.  2. Otherwise unremarkable grayscale ultrasound and Doppler assessment of the testes/epididymides.         [facility]     Dictated by (CST): Maria Teresa Alcantara MD on 6/03/2023 at 2:03 PM      Finalized by (CST): Stephon Gleason MD on 6/03/2023 at 2:06 PM              OTHER RELEVANT DATA:   none     IMPRESSION: Status post hydrocelectomy on 7/6/2023, doing well postoperatively. Patient knows that if his symptoms/swelling do not improve over the next several months to return to my clinic for reexamination and possible scrotal ultrasound. He knows to practice scrotal elevation, rest, ice and NSAIDs for pain control and swelling. PLAN:  Behavioral management  Return to clinic as needed    Thank you for referring this very pleasant patient to my clinic. If you have any questions or concerns, please do not hesitate to contact me. Sincerely,  Kim Johnson MD    20 minutes were spent on this patient at this visit obtaining a history, reviewing medical records, developing a treatment plan, counseling and discussing treatment strategy with patient, coordination of care and documentation. The [facility] makes medical notes available to patients in the interest of transparency. However, please be advised that this is a medical document. It is intended as a peer to peer communication. It is written in medical language and may contain abbreviations or verbiage that are technical and unfamiliar. It may appear blunt or direct. Medical documents are intended to carry relevant information, facts as evident, and the clinical opinion of the practitioner.

## 2023-09-29 ENCOUNTER — OFFICE VISIT (OUTPATIENT)
Dept: INTERNAL MEDICINE CLINIC | Facility: CLINIC | Age: 43
End: 2023-09-29

## 2023-09-29 ENCOUNTER — LAB ENCOUNTER (OUTPATIENT)
Dept: LAB | Age: 43
End: 2023-09-29
Attending: INTERNAL MEDICINE
Payer: COMMERCIAL

## 2023-09-29 VITALS
HEART RATE: 69 BPM | BODY MASS INDEX: 23.03 KG/M2 | DIASTOLIC BLOOD PRESSURE: 82 MMHG | HEIGHT: 72 IN | WEIGHT: 170 LBS | RESPIRATION RATE: 18 BRPM | SYSTOLIC BLOOD PRESSURE: 116 MMHG

## 2023-09-29 DIAGNOSIS — Z88.9 MULTIPLE ALLERGIES: ICD-10-CM

## 2023-09-29 DIAGNOSIS — Z14.8 HEMOCHROMATOSIS CARRIER: ICD-10-CM

## 2023-09-29 DIAGNOSIS — F41.9 ANXIETY: ICD-10-CM

## 2023-09-29 DIAGNOSIS — Z00.00 PHYSICAL EXAM, ANNUAL: Primary | ICD-10-CM

## 2023-09-29 LAB
ALBUMIN SERPL-MCNC: 4 G/DL (ref 3.4–5)
ALBUMIN/GLOB SERPL: 1 {RATIO} (ref 1–2)
ALP LIVER SERPL-CCNC: 56 U/L
ALT SERPL-CCNC: 17 U/L
ANION GAP SERPL CALC-SCNC: 4 MMOL/L (ref 0–18)
AST SERPL-CCNC: 17 U/L (ref 15–37)
BASOPHILS # BLD AUTO: 0.03 X10(3) UL (ref 0–0.2)
BASOPHILS NFR BLD AUTO: 0.5 %
BILIRUB SERPL-MCNC: 2 MG/DL (ref 0.1–2)
BUN BLD-MCNC: 13 MG/DL (ref 7–18)
BUN/CREAT SERPL: 11.1 (ref 10–20)
CALCIUM BLD-MCNC: 9.4 MG/DL (ref 8.5–10.1)
CHLORIDE SERPL-SCNC: 107 MMOL/L (ref 98–112)
CHOLEST SERPL-MCNC: 202 MG/DL (ref ?–200)
CO2 SERPL-SCNC: 29 MMOL/L (ref 21–32)
CREAT BLD-MCNC: 1.17 MG/DL
DEPRECATED HBV CORE AB SER IA-ACNC: 74.2 NG/ML
DEPRECATED RDW RBC AUTO: 37.8 FL (ref 35.1–46.3)
EGFRCR SERPLBLD CKD-EPI 2021: 80 ML/MIN/1.73M2 (ref 60–?)
EOSINOPHIL # BLD AUTO: 0.08 X10(3) UL (ref 0–0.7)
EOSINOPHIL NFR BLD AUTO: 1.2 %
ERYTHROCYTE [DISTWIDTH] IN BLOOD BY AUTOMATED COUNT: 11.7 % (ref 11–15)
FASTING PATIENT LIPID ANSWER: NO
FASTING STATUS PATIENT QL REPORTED: NO
GLOBULIN PLAS-MCNC: 3.9 G/DL (ref 2.8–4.4)
GLUCOSE BLD-MCNC: 83 MG/DL (ref 70–99)
HCT VFR BLD AUTO: 42.3 %
HDLC SERPL-MCNC: 57 MG/DL (ref 40–59)
HGB BLD-MCNC: 14.3 G/DL
IMM GRANULOCYTES # BLD AUTO: 0.02 X10(3) UL (ref 0–1)
IMM GRANULOCYTES NFR BLD: 0.3 %
LDLC SERPL CALC-MCNC: 133 MG/DL (ref ?–100)
LYMPHOCYTES # BLD AUTO: 2.04 X10(3) UL (ref 1–4)
LYMPHOCYTES NFR BLD AUTO: 31.6 %
MCH RBC QN AUTO: 29.7 PG (ref 26–34)
MCHC RBC AUTO-ENTMCNC: 33.8 G/DL (ref 31–37)
MCV RBC AUTO: 87.8 FL
MONOCYTES # BLD AUTO: 0.42 X10(3) UL (ref 0.1–1)
MONOCYTES NFR BLD AUTO: 6.5 %
NEUTROPHILS # BLD AUTO: 3.87 X10 (3) UL (ref 1.5–7.7)
NEUTROPHILS # BLD AUTO: 3.87 X10(3) UL (ref 1.5–7.7)
NEUTROPHILS NFR BLD AUTO: 59.9 %
NONHDLC SERPL-MCNC: 145 MG/DL (ref ?–130)
OSMOLALITY SERPL CALC.SUM OF ELEC: 289 MOSM/KG (ref 275–295)
PLATELET # BLD AUTO: 196 10(3)UL (ref 150–450)
POTASSIUM SERPL-SCNC: 4.7 MMOL/L (ref 3.5–5.1)
PROT SERPL-MCNC: 7.9 G/DL (ref 6.4–8.2)
RBC # BLD AUTO: 4.82 X10(6)UL
SODIUM SERPL-SCNC: 140 MMOL/L (ref 136–145)
TRIGL SERPL-MCNC: 67 MG/DL (ref 30–149)
TSI SER-ACNC: 1.07 MIU/ML (ref 0.36–3.74)
VLDLC SERPL CALC-MCNC: 12 MG/DL (ref 0–30)
WBC # BLD AUTO: 6.5 X10(3) UL (ref 4–11)

## 2023-09-29 PROCEDURE — 85025 COMPLETE CBC W/AUTO DIFF WBC: CPT | Performed by: INTERNAL MEDICINE

## 2023-09-29 PROCEDURE — 80053 COMPREHEN METABOLIC PANEL: CPT | Performed by: INTERNAL MEDICINE

## 2023-09-29 PROCEDURE — 80061 LIPID PANEL: CPT | Performed by: INTERNAL MEDICINE

## 2023-09-29 PROCEDURE — 84443 ASSAY THYROID STIM HORMONE: CPT | Performed by: INTERNAL MEDICINE

## 2023-09-29 PROCEDURE — 3074F SYST BP LT 130 MM HG: CPT | Performed by: INTERNAL MEDICINE

## 2023-09-29 PROCEDURE — 3079F DIAST BP 80-89 MM HG: CPT | Performed by: INTERNAL MEDICINE

## 2023-09-29 PROCEDURE — 99386 PREV VISIT NEW AGE 40-64: CPT | Performed by: INTERNAL MEDICINE

## 2023-09-29 PROCEDURE — 3008F BODY MASS INDEX DOCD: CPT | Performed by: INTERNAL MEDICINE

## 2023-09-29 PROCEDURE — 36415 COLL VENOUS BLD VENIPUNCTURE: CPT | Performed by: INTERNAL MEDICINE

## 2023-09-29 PROCEDURE — 82728 ASSAY OF FERRITIN: CPT | Performed by: INTERNAL MEDICINE

## 2023-09-29 RX ORDER — MONTELUKAST SODIUM 10 MG/1
10 TABLET ORAL DAILY
Qty: 90 TABLET | Refills: 3 | Status: SHIPPED | OUTPATIENT
Start: 2023-09-29 | End: 2024-09-23

## 2023-09-29 RX ORDER — ERGOCALCIFEROL 1.25 MG/1
50000 CAPSULE ORAL WEEKLY
COMMUNITY
Start: 2023-06-29

## 2023-09-29 RX ORDER — AZELASTINE HYDROCHLORIDE, FLUTICASONE PROPIONATE 137; 50 UG/1; UG/1
1 SPRAY, METERED NASAL 2 TIMES DAILY
Qty: 3 EACH | Refills: 1 | Status: SHIPPED | OUTPATIENT
Start: 2023-09-29

## 2023-09-29 RX ORDER — ESCITALOPRAM OXALATE 5 MG/1
5 TABLET ORAL DAILY
Qty: 90 TABLET | Refills: 1 | Status: SHIPPED | OUTPATIENT
Start: 2023-09-29

## 2023-09-29 RX ORDER — LEVOCETIRIZINE DIHYDROCHLORIDE 5 MG/1
5 TABLET, FILM COATED ORAL EVERY EVENING
Qty: 90 TABLET | Refills: 3 | Status: SHIPPED | OUTPATIENT
Start: 2023-09-29

## 2023-09-29 RX ORDER — BUSPIRONE HYDROCHLORIDE 10 MG/1
TABLET ORAL
Qty: 90 TABLET | Refills: 1 | Status: SHIPPED | OUTPATIENT
Start: 2023-09-29

## 2023-12-31 ENCOUNTER — HOSPITAL ENCOUNTER (OUTPATIENT)
Age: 43
Discharge: HOME | End: 2023-12-31
Payer: COMMERCIAL

## 2023-12-31 VITALS
OXYGEN SATURATION: 98 % | TEMPERATURE: 98 F | DIASTOLIC BLOOD PRESSURE: 82 MMHG | HEART RATE: 94 BPM | SYSTOLIC BLOOD PRESSURE: 111 MMHG | RESPIRATION RATE: 14 BRPM
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
## 2023-12-31 DIAGNOSIS — S81.851A DOG BITE OF RIGHT LOWER LEG, INITIAL ENCOUNTER: Primary | ICD-10-CM

## 2023-12-31 RX ORDER — CELECOXIB 200 MG/1
CAPSULE ORAL
COMMUNITY
Start: 2023-12-20

## 2023-12-31 RX ORDER — AMOXICILLIN AND CLAVULANATE POTASSIUM 875; 125 MG/1; MG/1
1 TABLET, FILM COATED ORAL 2 TIMES DAILY
Qty: 14 TABLET | Refills: 0 | Status: SHIPPED | OUTPATIENT
Start: 2023-12-31 | End: 2024-01-07

## 2023-12-31 NOTE — DISCHARGE INSTRUCTIONS
Soak injured area in diluted betadine or hydrogen peroxide, or Hibiclens solution. Soak for 10-15 minutes and do this 3-4 times per day for the next few days. Wash wounds with soap and water. Pat dry and apply antibiotic ointment. You may cover with a bandage. Inspect wound closely for signs of infection. Elevate right leg often above the level of the heart is best.  You may take over-the-counter acetaminophen and/or ibuprofen if needed for discomfort. Signs of infection (wound): Increased redness, red streaking, increased pain, increased warmth to area, swelling, pus-like drainage, fever, muscle aches, or generally feeling weak or ill. Seek immediate medical attention if any of the above symptoms occur    Please check with your neighbors to assure that the dog's immunizations and rabies vaccines are up-to-date.   If the dog's rabies vaccine is not up-to-date, recommend rabies prophylaxis

## 2023-12-31 NOTE — ED INITIAL ASSESSMENT (HPI)
DOG BITE TO RIGHT LOWER LEG LAST NIGHT FROM NEIGHBORS DOG  UNCERTAIN IF RABIES VACCINATED  LACERATIONS TO LEG

## 2024-03-07 NOTE — TELEPHONE ENCOUNTER
Assessment and Plan:     Problem List Items Addressed This Visit        Respiratory    Maxillary sinus cyst     Right sinus asymptomatic             Cardiovascular and Mediastinum    Migraine headache     She has not hand any issues with migraine for a while now. No on medications for this.             Genitourinary    Right nephrolithiasis     No issues since 2016            Other    Raynaud's phenomenon     On baby aspirin          Hypercholesterolemia    Relevant Orders    Lipid Panel with Direct LDL reflex    TSH, 3rd generation with Free T4 reflex    Comprehensive metabolic panel    CBC    Postmenopausal - Primary     Patient does exercise 20 per day and takes multivitamin          Relevant Orders    DXA bone density spine hip and pelvis    Medicare annual wellness visit, subsequent    RESOLVED: Endometriosis     S/P hysterectomy 10 years ago             Depression Screening and Follow-up Plan: Patient was screened for depression during today's encounter. They screened negative with a PHQ-2 score of 0.      Preventive health issues were discussed with patient, and age appropriate screening tests were ordered as noted in patient's After Visit Summary.  Personalized health advice and appropriate referrals for health education or preventive services given if needed, as noted in patient's After Visit Summary.     History of Present Illness:     Patient presents for a Medicare Wellness Visit    Patient presents today as a new patient. Her current pcp is cutting back hours and she is also looking a new provider and wanted a female  Patient has no concerns today and just here for a check up        Patient Care Team:  PACHECO Da Silva as PCP - General (Family Medicine)  Romain Ceja DO as PCP - PCP-[facility]  Russel Perkins MD (Obstetrics and Gynecology)     Review of Systems:     Review of Systems   Respiratory:  Negative for shortness of breath.    Cardiovascular:  Negative for  Follow-up visit Monday, January 22 sounds great. Thank you Joao. chest pain and palpitations.   Gastrointestinal:  Negative for constipation.   Genitourinary:  Negative for difficulty urinating.   Skin:  Positive for color change (fingers).   Neurological:  Negative for dizziness, seizures, syncope and headaches.   Psychiatric/Behavioral:  Positive for sleep disturbance. Negative for dysphoric mood. Self-injury: light sleeper.The patient is not nervous/anxious.         Problem List:     Patient Active Problem List   Diagnosis   • Right nephrolithiasis   • Raynaud's phenomenon   • Migraine headache   • Maxillary sinus cyst   • Learning difficulty   • Allergic rhinitis   • Hypercholesterolemia   • Postmenopausal   • Medicare annual wellness visit, subsequent      Past Medical and Surgical History:     Past Medical History:   Diagnosis Date   • Acute cystitis with hematuria     Last assessed: 3/1/16   • Carpal tunnel syndrome of left wrist 12/2017   • Confusion and disorientation 12/27/2017   • Endometriosis    • Kidney stone    • Learning disabilities    • Migraine headache    • Raynaud's phenomenon    • Reactive airway disease    • Syncope 2014    Last assessed: 10/10/13     Past Surgical History:   Procedure Laterality Date   • COLONOSCOPY W/ BIOPSIES  07/21/2020    5 YEAR RECOMMENDED = TUBULAR ADENOMA   • EXPLORATORY LAPAROTOMY     • HYSTERECTOMY  08/2014    Abdominal, supracervical   • OOPHORECTOMY        Family History:     Family History   Adopted: Yes      Social History:     Social History     Socioeconomic History   • Marital status: Single     Spouse name: None   • Number of children: None   • Years of education: None   • Highest education level: None   Occupational History   • None   Tobacco Use   • Smoking status: Never   • Smokeless tobacco: Never   Vaping Use   • Vaping status: Never Used   Substance and Sexual Activity   • Alcohol use: No   • Drug use: No   • Sexual activity: Not Currently   Other Topics Concern   • None   Social History Narrative   • None     Social  Determinants of Health     Financial Resource Strain: Low Risk  (3/7/2024)    Overall Financial Resource Strain (CARDIA)    • Difficulty of Paying Living Expenses: Not hard at all   Food Insecurity: Not on file   Transportation Needs: No Transportation Needs (3/7/2024)    PRAPARE - Transportation    • Lack of Transportation (Medical): No    • Lack of Transportation (Non-Medical): No   Physical Activity: Not on file   Stress: Not on file   Social Connections: Not on file   Intimate Partner Violence: Not on file   Housing Stability: Not on file      Medications and Allergies:     Current Outpatient Medications   Medication Sig Dispense Refill   • aspirin 81 MG tablet Take 81 mg by mouth daily.     • Calcium 500 MG tablet Take 1 tablet by mouth daily       • estradiol (ESTRACE) 1 mg tablet Take 1 tablet (1 mg total) by mouth daily 30 tablet 12   • Flaxseed, Linseed, (FLAX SEED OIL) 1300 MG CAPS Take 1 capsule by mouth daily (Patient not taking: Reported on 12/22/2022)     • magnesium oxide (MAG-OX) 400 mg Take 1 tablet by mouth daily (Patient not taking: Reported on 12/22/2022) 30 tablet 0     No current facility-administered medications for this visit.     Allergies   Allergen Reactions   • Bee Venom    • Morphine Hallucinations   • Sulfa Antibiotics GI Intolerance   • Penicillins Rash   • Tetracycline Rash      Immunizations:     Immunization History   Administered Date(s) Administered   • COVID-19 PFIZER VACCINE 0.3 ML IM 04/24/2021, 05/15/2021   • INFLUENZA 10/27/2022   • Influenza Quadrivalent Preservative Free 3 years and older IM 10/16/2014, 10/31/2017   • Influenza Quadrivalent, 6-35 Months IM 10/20/2015, 10/27/2016   • Influenza, injectable, quadrivalent, preservative free 0.5 mL 10/02/2023   • Influenza, recombinant, quadrivalent,injectable, preservative free 12/03/2018, 09/25/2019, 10/15/2020, 10/25/2021, 10/27/2022   • Influenza, seasonal, injectable 10/01/2012, 10/10/2013      Health Maintenance:          Topic Date Due   • Breast Cancer Screening: Mammogram  02/14/2024   • HIV Screening  12/12/2029 (Originally 5/4/1980)   • Hepatitis C Screening  01/09/2030 (Originally 1965)   • Colorectal Cancer Screening  07/21/2025         Topic Date Due   • COVID-19 Vaccine (3 - 2023-24 season) 09/01/2023      Medicare Screening Tests and Risk Assessments:     Kristi is here for her Subsequent Wellness visit.     Health Risk Assessment:   Patient rates overall health as good. Patient feels that their physical health rating is same. Patient is very satisfied with their life. Eyesight was rated as same. Hearing was rated as slightly worse. Patient feels that their emotional and mental health rating is same. Patients states they are never, rarely angry. Patient states they are never, rarely unusually tired/fatigued. Pain experienced in the last 7 days has been none. Patient states that she has experienced no weight loss or gain in last 6 months.     Depression Screening:   PHQ-2 Score: 0      Fall Risk Screening:   In the past year, patient has experienced: no history of falling in past year      Urinary Incontinence Screening:   Patient has not leaked urine accidently in the last six months.     Home Safety:  Patient does not have trouble with stairs inside or outside of their home. Patient has working smoke alarms and has no working carbon monoxide detector. Home safety hazards include: none.     Nutrition:   Current diet is Regular.     Medications:   Patient is currently taking over-the-counter supplements. OTC medications include: see medication list. Patient is able to manage medications.     Activities of Daily Living (ADLs)/Instrumental Activities of Daily Living (IADLs):   Walk and transfer into and out of bed and chair?: Yes  Dress and groom yourself?: Yes    Bathe or shower yourself?: Yes    Feed yourself? Yes  Do your laundry/housekeeping?: Yes  Manage your money, pay your bills and track your expenses?: Yes  Make  your own meals?: Yes    Do your own shopping?: Yes    Previous Hospitalizations:   Any hospitalizations or ED visits within the last 12 months?: No      Advance Care Planning:   Living will: No    Durable POA for healthcare: No    Advanced directive: No    Advanced directive counseling given: Yes    ACP document given: Yes      PREVENTIVE SCREENINGS      Cardiovascular Screening:    General: Screening Not Indicated, History Lipid Disorder and Screening Current    Due for: Lipid Panel      Diabetes Screening:     General: Screening Current    Due for: Blood Glucose      Colorectal Cancer Screening:     General: Screening Current      Breast Cancer Screening:     General: Screening Current      Cervical Cancer Screening:    General: Screening Current      Lung Cancer Screening:     General: Screening Not Indicated      Hepatitis C Screening:    General: Screening Current    Screening, Brief Intervention, and Referral to Treatment (SBIRT)    Screening    Typical number of drinks in a week: 0    AUDIT-C Screenin) How often did you have a drink containing alcohol in the past year? never  2) How many drinks did you have on a typical day when you were drinking in the past year? 0  3) How often did you have 6 or more drinks on one occasion in the past year? never    AUDIT-C Score: 0  Interpretation: Score 0-2 (female): Negative screen for alcohol misuse    Single Item Drug Screening:  How often have you used an illegal drug (including marijuana) or a prescription medication for non-medical reasons in the past year? never    Single Item Drug Screen Score: 0  Interpretation: Negative screen for possible drug use disorder    No results found.     Physical Exam:     /80 (BP Location: Left arm, Patient Position: Sitting, Cuff Size: Standard)   Pulse 69   Ht 5' (1.524 m)   Wt 62.7 kg (138 lb 3.2 oz)   SpO2 97%   BMI 26.99 kg/m²     Physical Exam  Vitals and nursing note reviewed.   Constitutional:       General:  She is not in acute distress.     Appearance: Normal appearance. She is well-developed and normal weight. She is not diaphoretic.   HENT:      Head: Normocephalic and atraumatic.      Right Ear: Tympanic membrane and external ear normal.      Left Ear: Tympanic membrane and external ear normal.      Nose: Nose normal.      Mouth/Throat:      Mouth: Mucous membranes are moist.      Pharynx: No oropharyngeal exudate or posterior oropharyngeal erythema.   Eyes:      Extraocular Movements: Extraocular movements intact.      Conjunctiva/sclera: Conjunctivae normal.      Pupils: Pupils are equal, round, and reactive to light.   Neck:      Thyroid: No thyromegaly.      Vascular: No carotid bruit or JVD.   Cardiovascular:      Rate and Rhythm: Normal rate and regular rhythm.      Pulses:           Carotid pulses are 2+ on the right side and 2+ on the left side.     Heart sounds: Normal heart sounds, S1 normal and S2 normal. No murmur heard.  Pulmonary:      Effort: Pulmonary effort is normal.      Breath sounds: Normal breath sounds.   Abdominal:      General: Bowel sounds are normal.      Palpations: Abdomen is soft.      Tenderness: There is no abdominal tenderness.   Musculoskeletal:         General: Normal range of motion.      Cervical back: Normal range of motion.   Lymphadenopathy:      Cervical: No cervical adenopathy.   Skin:     General: Skin is warm.      Capillary Refill: Capillary refill takes less than 2 seconds.   Neurological:      Mental Status: She is alert and oriented to person, place, and time.      Motor: Motor function is intact.      Gait: Gait is intact.   Psychiatric:         Mood and Affect: Mood normal.         Behavior: Behavior normal.         Thought Content: Thought content normal.         Judgment: Judgment normal.          PACHECO Gutierrez

## 2024-03-22 RX ORDER — ESCITALOPRAM OXALATE 5 MG/1
5 TABLET ORAL DAILY
Qty: 90 TABLET | Refills: 3 | Status: SHIPPED | OUTPATIENT
Start: 2024-03-22

## 2024-03-22 NOTE — TELEPHONE ENCOUNTER
Refill passed per [facility] protocol.  Requested Prescriptions   Pending Prescriptions Disp Refills    ESCITALOPRAM 5 MG Oral Tab [Pharmacy Med Name: ESCITALOPRAM 5 MG TABLET] 90 tablet 1     Sig: Take 1 tablet (5 mg total) by mouth daily.       Psychiatric Non-Scheduled (Anti-Anxiety) Passed - 3/22/2024  8:27 AM        Passed - In person appointment or virtual visit in the past 6 mos or appointment in next 3 mos     Recent Outpatient Visits              5 months ago Physical exam, annual    [facility] [place], N[place], MD    Office Visit    [facility]    [facility]    8 months ago Hydrocele, left    [facility]    Nurse Only    9 months ago Hydrocele, unspecified hydrocele type    [facility] [place]    [place] Visit    2 years ago Environmental and seasonal allergies    [facility] [place]    Nurse Only                      Passed - Depression Screening completed within the past 12 months

## 2024-04-01 ENCOUNTER — TELEMEDICINE (OUTPATIENT)
Dept: INTERNAL MEDICINE CLINIC | Facility: CLINIC | Age: 44
End: 2024-04-01

## 2024-04-01 DIAGNOSIS — R53.83 OTHER FATIGUE: Primary | ICD-10-CM

## 2024-04-01 DIAGNOSIS — F32.4 MAJOR DEPRESSIVE DISORDER WITH SINGLE EPISODE, IN PARTIAL REMISSION (HCC): ICD-10-CM

## 2024-04-01 DIAGNOSIS — J30.89 NON-SEASONAL ALLERGIC RHINITIS DUE TO OTHER ALLERGIC TRIGGER: ICD-10-CM

## 2024-04-01 RX ORDER — AZELASTINE HYDROCHLORIDE, FLUTICASONE PROPIONATE 137; 50 UG/1; UG/1
1 SPRAY, METERED NASAL 2 TIMES DAILY
Qty: 3 EACH | Refills: 1 | Status: SHIPPED | OUTPATIENT
Start: 2024-04-01

## 2024-04-01 RX ORDER — LEVOCETIRIZINE DIHYDROCHLORIDE 5 MG/1
5 TABLET, FILM COATED ORAL EVERY EVENING
Qty: 90 TABLET | Refills: 3 | Status: SHIPPED | OUTPATIENT
Start: 2024-04-01

## 2024-04-01 RX ORDER — MONTELUKAST SODIUM 10 MG/1
10 TABLET ORAL NIGHTLY
Qty: 90 TABLET | Refills: 3 | Status: SHIPPED | OUTPATIENT
Start: 2024-04-01

## 2024-04-01 RX ORDER — MONTELUKAST SODIUM 10 MG/1
10 TABLET ORAL DAILY
Qty: 90 TABLET | Refills: 3 | Status: SHIPPED | OUTPATIENT
Start: 2024-04-01 | End: 2025-03-27

## 2024-04-02 NOTE — PROGRESS NOTES
Telemedicine Note    Virtual Video Check-In    Marcell Weiss verbally consents to a Virtual Video Check-In service on 04/01/24. Patient understands and accepts financial responsibility for any deductible, co-insurance and/or co-pays associated with this service. This visit is conducted using Telemedicine with live, interactive video and audio.     I spoke with Marcell Weiss by secure video chat, verified date of birth, and discussed their current concerns:       HPI :  Patient reports that for about a month he has been experiencing more than usual fatigue in spite of getting enough sleep, does feel that maybe he feels slightly more stressed, and depressed, has no stamina or desire to do regular stuff that he used to do.  He has been taking BuSpar and escitalopram 5 mg daily currently.  Denies fever or chills, deny recent viral symptoms.  Appetite is fair  He feels that seasonal allergies are acting up, he does not take anything at present to help his symptoms, he snores at night, though does not feel that nose totally blocked.  Needs refill on Xyzal, montelukast and sprays,    Past Medical History:   Diagnosis Date    Allergic rhinitis     Anesthesia complication     woke up during procedure    Anxiety state     Arthritis     Back pain     Back problem     C. difficile diarrhea 2018    Cirrhosis of liver (HCC)     2020    Cyclical vomiting     Depression     Hemochromatosis     Hepatitis     High cholesterol     Hx of motion sickness     sMALL BOATS    Hyperbilirubinemia     Migraines     Osteoarthritis     Pneumonia due to organism      Past Surgical History:   Procedure Laterality Date    Dental surgery procedure      Egd      Eye surgery  2012    ICL    Intraocular lens insertion Bilateral 2012    Sinus surgery        septoplasty    Upper gi endoscopy,exam      [place] teeth removed       Family History   Problem Relation Age of Onset    Alcohol and Other Disorders Associated Father     Cataracts  Mother     Anxiety Mother     Depression Mother     Cancer Other 76        maternal great uncle; unknown etiology    Bleeding Disorders Neg     Clotting Disorder Neg     Anemia Neg       Patient Active Problem List   Diagnosis    Hemochromatosis    Hyperbilirubinemia    Cervical disc disorder with radiculopathy of cervical region    Acidosis    Intractable cyclical vomiting with nausea    Hypoglycemia    Toxic effect of alcohol, unintentional    Fatty liver    Gastroesophageal reflux disease    Inflammatory neuropathy (HCC)    Abnormal urinalysis    Cervical spinal stenosis    Allergic rhinitis    Closed fracture of middle or proximal phalanx or phalanges of hand    Stiffness of joint, hand    Throat pain    Cyclical vomiting with nausea    Cyclical vomiting with nausea, intractability of vomiting not specified    Dehydration    Alcohol withdrawal with delirium (HCC)    SETH (acute kidney injury) (HCC)    Hypophosphatemia    Hypomagnesemia    Hypocalcemia    Ascites due to alcoholic hepatitis (HCC)    Alcoholic hepatitis (HCC)    Jaundice    Severe episode of recurrent major depressive disorder, without psychotic features (HCC)    Alcohol dependence, continuous (HCC)    C. difficile colitis    Malnutrition (HCC)     Current Outpatient Medications   Medication Sig Dispense Refill    montelukast 10 MG Oral Tab Take 1 tablet (10 mg total) by mouth nightly. 90 tablet 3    levocetirizine 5 MG Oral Tab Take 1 tablet (5 mg total) by mouth every evening. 90 tablet 3    Azelastine-Fluticasone (DYMISTA) 137-50 MCG/ACT Nasal Suspension 1 Squirt by Nasal route 2 (two) times daily. 3 each 1    escitalopram 5 MG Oral Tab Take 1 tablet (5 mg total) by mouth daily. 90 tablet 3    celecoxib 200 MG Oral Cap TAKE 1 CAPSULE WITH FOOD ORALLY TWO TIMES PER DAY 30 DAYS      ergocalciferol 1.25 MG (34633 UT) Oral Cap Take 1 capsule (50,000 Units total) by mouth once a week. (Patient not taking: Reported on 9/29/2023)      levocetirizine 5  MG Oral Tab Take 1 tablet (5 mg total) by mouth every evening. 90 tablet 3    montelukast (SINGULAIR) 10 MG Oral Tab Take 1 tablet (10 mg total) by mouth daily. 90 tablet 3    Azelastine-Fluticasone (DYMISTA) 137-50 MCG/ACT Nasal Suspension 1 spray by Nasal route 2 (two) times daily. 3 each 1    busPIRone 10 MG Oral Tab Take 1 tab po daily 90 tablet 1    oxyCODONE 5 MG Oral Tab Take 1 tablet (5 mg total) by mouth every 4 (four) hours as needed for Pain. 5 tablet 0    docusate sodium 100 MG Oral Cap Take 1 capsule (100 mg total) by mouth 2 (two) times daily. 30 capsule 0    Naloxone HCl 4 MG/0.1ML Nasal Liquid 4 mg by Nasal route as needed. If patient remains unresponsive, repeat dose in other nostril 2-5 minutes after first dose. (Patient not taking: Reported on 9/29/2023) 1 kit 0    Pseudoeph-Doxylamine-DM-APAP (NYQUIL OR) Take by mouth at bedtime.      busPIRone 10 MG Oral Tab        Allergies   Allergen Reactions    Pollen OTHER (SEE COMMENTS)     Sneezing and runny nose    Dander ITCHING and OTHER (SEE COMMENTS)     Sneezing with cats and dogs       ROS   GENERAL: feels well ,otherwise negative for fever or chills  HEENT: negative for sinus pain or sore throat   LUNGS: negative for shortness of breath or cough ,no wheezing   CARDIOVASCULAR: negative for chest pain dyspnea on exertion or palpitations   GI: negative for abdominal pain,  nausea,vomiting, diarrhea or constipation  : negative for burning with urination, frequency with urination   MUSCULOSKELETAL: negative for body  aches   NEURO: negative for headache and dizziness  PSYCHE: negative for depression or anxiety symptoms    ALL/ASTHMA: negative for allergy symptoms     Physical Exam  Patient alert and oriented x3  Patient does not appear in any respiratory distress during the conversation  No labored breathing   No rash on visible area of the skin  Movement of upper extremities normal     Summary of topics discussed/ diagnosis:  Assessment and plan      1. Other fatigue etiology not clear start with a blood test, advised patient to follow-up in person for physical exam may need additional testing  - CBC With Differential With Platelet  - Comp Metabolic Panel (14)  - TSH W Reflex To Free T4  2.  Allergic rhinitis, will continue Xyzal, Dymista and montelukast, consider seeing allergist again  3.  Major depressive disorder with single episode in partial remission advised patient to increase escitalopram to take 10 mg daily continue buspirone, follow-up in 1 month in person in the office              Explained to patient rationale of phone triage and evaluation due to current COVID-19 outbreak based upon CDC recommendations, as well as limitations of video triage including but not limited to the inability to perform appropriate physical exam nor face-to-face examination, which may limit and/or reduce diagnostic accuracy. Marcell Weiss was informed and agrees that this telemedicine visit will charged. Marcell Weiss expresses understanding, accepts these limitations, and agrees to video evaluation as documented above. Marcell Weiss understands video evaluation is not a substitute for face-to-face examination or emergency care. Patient advised to go to ER or call 911 for worsening symptoms or acute distress.   Dorothy Cm MD

## 2024-04-05 ENCOUNTER — LAB ENCOUNTER (OUTPATIENT)
Dept: LAB | Age: 44
End: 2024-04-05
Attending: INTERNAL MEDICINE
Payer: COMMERCIAL

## 2024-04-05 LAB
ALBUMIN SERPL-MCNC: 3.7 G/DL (ref 3.4–5)
ALBUMIN/GLOB SERPL: 1 {RATIO} (ref 1–2)
ALP LIVER SERPL-CCNC: 67 U/L
ALT SERPL-CCNC: 20 U/L
ANION GAP SERPL CALC-SCNC: 4 MMOL/L (ref 0–18)
AST SERPL-CCNC: 15 U/L (ref 15–37)
BASOPHILS # BLD AUTO: 0.04 X10(3) UL (ref 0–0.2)
BASOPHILS NFR BLD AUTO: 0.6 %
BILIRUB SERPL-MCNC: 1.8 MG/DL (ref 0.1–2)
BUN BLD-MCNC: 17 MG/DL (ref 9–23)
CALCIUM BLD-MCNC: 9.3 MG/DL (ref 8.5–10.1)
CHLORIDE SERPL-SCNC: 111 MMOL/L (ref 98–112)
CO2 SERPL-SCNC: 26 MMOL/L (ref 21–32)
CREAT BLD-MCNC: 1.35 MG/DL
EGFRCR SERPLBLD CKD-EPI 2021: 67 ML/MIN/1.73M2 (ref 60–?)
EOSINOPHIL # BLD AUTO: 0.03 X10(3) UL (ref 0–0.7)
EOSINOPHIL NFR BLD AUTO: 0.4 %
ERYTHROCYTE [DISTWIDTH] IN BLOOD BY AUTOMATED COUNT: 12.1 %
FASTING STATUS PATIENT QL REPORTED: YES
GLOBULIN PLAS-MCNC: 3.6 G/DL (ref 2.8–4.4)
GLUCOSE BLD-MCNC: 95 MG/DL (ref 70–99)
HCT VFR BLD AUTO: 43.2 %
HGB BLD-MCNC: 14.2 G/DL
IMM GRANULOCYTES # BLD AUTO: 0.02 X10(3) UL (ref 0–1)
IMM GRANULOCYTES NFR BLD: 0.3 %
LYMPHOCYTES # BLD AUTO: 1.91 X10(3) UL (ref 1–4)
LYMPHOCYTES NFR BLD AUTO: 28 %
MCH RBC QN AUTO: 29.8 PG (ref 26–34)
MCHC RBC AUTO-ENTMCNC: 32.9 G/DL (ref 31–37)
MCV RBC AUTO: 90.8 FL
MONOCYTES # BLD AUTO: 0.39 X10(3) UL (ref 0.1–1)
MONOCYTES NFR BLD AUTO: 5.7 %
NEUTROPHILS # BLD AUTO: 4.44 X10 (3) UL (ref 1.5–7.7)
NEUTROPHILS # BLD AUTO: 4.44 X10(3) UL (ref 1.5–7.7)
NEUTROPHILS NFR BLD AUTO: 65 %
OSMOLALITY SERPL CALC.SUM OF ELEC: 293 MOSM/KG (ref 275–295)
PLATELET # BLD AUTO: 183 10(3)UL (ref 150–450)
POTASSIUM SERPL-SCNC: 4.9 MMOL/L (ref 3.5–5.1)
PROT SERPL-MCNC: 7.3 G/DL (ref 6.4–8.2)
RBC # BLD AUTO: 4.76 X10(6)UL
SODIUM SERPL-SCNC: 141 MMOL/L (ref 136–145)
TSI SER-ACNC: 0.62 MIU/ML (ref 0.36–3.74)
WBC # BLD AUTO: 6.8 X10(3) UL (ref 4–11)

## 2024-04-05 PROCEDURE — 84443 ASSAY THYROID STIM HORMONE: CPT | Performed by: INTERNAL MEDICINE

## 2024-04-05 PROCEDURE — 36415 COLL VENOUS BLD VENIPUNCTURE: CPT | Performed by: INTERNAL MEDICINE

## 2024-04-05 PROCEDURE — 80053 COMPREHEN METABOLIC PANEL: CPT | Performed by: INTERNAL MEDICINE

## 2024-04-05 PROCEDURE — 85025 COMPLETE CBC W/AUTO DIFF WBC: CPT | Performed by: INTERNAL MEDICINE

## 2024-04-05 RX ORDER — BUSPIRONE HYDROCHLORIDE 10 MG/1
TABLET ORAL
Qty: 90 TABLET | Refills: 0 | Status: SHIPPED | OUTPATIENT
Start: 2024-04-05

## 2024-04-05 NOTE — TELEPHONE ENCOUNTER
Please review; protocol failed/ has no protocol    Requested Prescriptions   Pending Prescriptions Disp Refills    busPIRone 10 MG Oral Tab 90 tablet 1     Sig: Take 1 tab po daily       There is no refill protocol information for this order        Recent Outpatient Visits              4 days ago Other fatigue    [facility], [place], [place], MD    Telemedicine    6 months ago Physical exam, annual    [facility], [place], N[place], MD    Office Visit    8 months ago Hydrocele, left    [facility], Genevieve Driver MD    Office Visit    8 months ago [facility]cele, [facility]    [facility]urse Only    9 months ago Hydrocele, unspecified hydrocele type    [facility]    Office Visit

## 2024-05-23 ENCOUNTER — TELEPHONE (OUTPATIENT)
Dept: INTERNAL MEDICINE CLINIC | Facility: CLINIC | Age: 44
End: 2024-05-23

## 2024-05-23 NOTE — TELEPHONE ENCOUNTER
escitalopram 5 MG Oral Tab, Take 1 tablet (5 mg total) by mouth daily., Disp: 90 tablet, Rfl: 3

## 2024-05-23 NOTE — TELEPHONE ENCOUNTER
escitalopram 5 MG Oral Tab sent 03/22/2024 for a year supply to [facility] #[id number]  Duplicate request, previously addressed.

## 2024-05-24 ENCOUNTER — TELEPHONE (OUTPATIENT)
Dept: INTERNAL MEDICINE CLINIC | Facility: CLINIC | Age: 44
End: 2024-05-24

## 2024-05-29 ENCOUNTER — TELEPHONE (OUTPATIENT)
Dept: INTERNAL MEDICINE CLINIC | Facility: CLINIC | Age: 44
End: 2024-05-29

## 2024-05-29 RX ORDER — ESCITALOPRAM OXALATE 5 MG/1
5 TABLET ORAL DAILY
Qty: 90 TABLET | Refills: 1 | Status: SHIPPED | OUTPATIENT
Start: 2024-05-29

## 2024-05-30 NOTE — TELEPHONE ENCOUNTER
Disp Refills Start End     escitalopram 10 MG Oral Tab 90 tablet 1 5/29/2024 --    Sig - Route: Take 1 tablet (10 mg total) by mouth daily. This is correct dose - Oral    Sent to pharmacy as: Escitalopram Oxalate 10 MG Oral Tablet (Lexapro)    Notes to Pharmacy: Discontinue Lexapro 5 mg    E-Prescribing Status: Receipt confirmed by pharmacy (5/29/2024  7:13 PM CDT)      Pharmacy    [facility]ACY #[id number] - [place], [place] - [address] [place] [address] [phone], [phone]

## 2024-07-24 RX ORDER — BUSPIRONE HYDROCHLORIDE 10 MG/1
TABLET ORAL
Qty: 30 TABLET | Refills: 2 | Status: SHIPPED | OUTPATIENT
Start: 2024-07-24

## 2024-07-24 NOTE — TELEPHONE ENCOUNTER
Please review. Protocol Failed; No Protocol    Requested Prescriptions   Pending Prescriptions Disp Refills    BUSPIRONE 10 MG Oral Tab [Pharmacy Med Name: BUSPIRONE HCL 10 MG TABLET] 30 tablet 2     Sig: TAKE 1 TABLET BY MOUTH EVERY DAY       There is no refill protocol information for this order              Recent Outpatient Visits              3 months ago Other fatigue    [facility], [place], Ninel, MD    Telemedicine    9 months ago Physical exam, annual    [facility], [place], Ninel, MD    Office Visit    11 months ago Hydrocele, left    [facility], Genevieve Driver MD    Office Visit    1 year ago Hydrocele, left    [facility], [place]    Nurse Only    1 year ago Hydrocele, unspecified hydrocele type    [facility], Genevieve Driver MD    Office Visit

## 2024-10-22 RX ORDER — BUSPIRONE HYDROCHLORIDE 10 MG/1
TABLET ORAL
Qty: 30 TABLET | Refills: 2 | OUTPATIENT
Start: 2024-10-22

## 2024-10-22 NOTE — TELEPHONE ENCOUNTER
Please review. Protocol Failed; No Protocol    Requested Prescriptions   Pending Prescriptions Disp Refills    busPIRone 10 MG Oral Tab 30 tablet 2     Sig: TAKE 1 TABLET BY MOUTH EVERY DAY       There is no refill protocol information for this order              Recent Outpatient Visits              6 months ago Other fatigue    [facility], [place], Ninel, MD    Telemedicine    1 year ago Physical exam, annual    [facility], [place], Ninel, MD    Office Visit    1 year ago Hydrocele, left    [facility], Genevieve Driver MD    Office Visit    1 year ago [facility]    1 year ago Hydrocele, unspecified hydrocele type    [facility], Genevieve Driver MD    Office Visit

## 2024-11-27 NOTE — TELEPHONE ENCOUNTER
Please review;  [facility] protocol failed/ No protocol       Last office visit 9/29/2023  No future appointments.      Requested Prescriptions   Pending Prescriptions Disp Refills    ESCITALOPRAM 10 MG Oral Tab [Pharmacy Med Name: ESCITALOPRAM 10 MG TABLET] 90 tablet 1     Sig: Take 1 tablet (10 mg total) by mouth daily. This is correct dose       Psychiatric Non-Scheduled (Anti-Anxiety) Failed - 11/27/2024  3:28 PM        Failed - In person appointment or virtual visit in the past 6 mos or appointment in next 3 mos     Recent Outpatient Visits              8 months ago Other fatigue    [facility], [facility] [place], Ninel, MD    Telemedicine    1 year ago Physical exam, annual    [facility] [place] [place], N[place], MD    Office Visit    1 year ago Hydrocele, left    [facility], Genevieve Driver MD    Office Visit    1 year ago Hydrocele, [facility], [place]    Nurse Only    1 year ago Hydrocele, unspecified hydrocele type    [facility]    [facility] Visit                      Failed - Depression Screening completed within the past 12 months             Recent Outpatient Visits              8 months ago Other fatigue    [facility], [place], Ninel, MD    Telemedicine    1 year ago Physical exam, annual    [facility], [place], Ninel, MD    Office Visit    1 year ago Hydrocele, left    [facility]    [facility]    1 [facility]    [facility]    1 year ago Hydrocele, unspecified hydrocele type    [facility]    [facility] Visit

## 2024-11-28 RX ORDER — ESCITALOPRAM OXALATE 10 MG/1
10 TABLET ORAL DAILY
Qty: 90 TABLET | Refills: 0 | Status: SHIPPED | OUTPATIENT
Start: 2024-11-28

## 2024-12-05 ENCOUNTER — HOSPITAL ENCOUNTER (OUTPATIENT)
Age: 44
Discharge: HOME | End: 2024-12-05
Payer: COMMERCIAL

## 2024-12-05 ENCOUNTER — TELEPHONE (OUTPATIENT)
Dept: INTERNAL MEDICINE CLINIC | Facility: CLINIC | Age: 44
End: 2024-12-05

## 2024-12-05 VITALS
WEIGHT: 170 LBS | HEART RATE: 80 BPM | OXYGEN SATURATION: 97 % | RESPIRATION RATE: 18 BRPM | TEMPERATURE: 98 F | BODY MASS INDEX: 23.03 KG/M2 | DIASTOLIC BLOOD PRESSURE: 91 MMHG | HEIGHT: 72 IN | SYSTOLIC BLOOD PRESSURE: 123 MMHG

## 2024-12-05 DIAGNOSIS — R31.9 HEMATURIA, UNSPECIFIED TYPE: ICD-10-CM

## 2024-12-05 DIAGNOSIS — R39.15 URINARY URGENCY: Primary | ICD-10-CM

## 2024-12-05 LAB
GLUCOSE UR STRIP-MCNC: NEGATIVE MG/DL
LEUKOCYTE ESTERASE UR QL STRIP: NEGATIVE
NITRITE UR QL STRIP: NEGATIVE
PH UR STRIP: 7 [PH]
PROT UR STRIP-MCNC: 30 MG/DL
SP GR UR STRIP: 1.02
UROBILINOGEN UR STRIP-ACNC: <2 MG/DL

## 2024-12-05 PROCEDURE — 81002 URINALYSIS NONAUTO W/O SCOPE: CPT | Performed by: NURSE PRACTITIONER

## 2024-12-05 PROCEDURE — 99214 OFFICE O/P EST MOD 30 MIN: CPT | Performed by: NURSE PRACTITIONER

## 2024-12-05 RX ORDER — CIPROFLOXACIN 500 MG/1
500 TABLET, FILM COATED ORAL 2 TIMES DAILY
Qty: 28 TABLET | Refills: 0 | Status: SHIPPED | OUTPATIENT
Start: 2024-12-05 | End: 2024-12-19

## 2024-12-05 NOTE — ED INITIAL ASSESSMENT (HPI)
Patient reports urgency and frequency with regards to urination since last night. Some discomfort at base of penis but no dysuria

## 2024-12-05 NOTE — ED PROVIDER NOTES
Patient Seen in: [facility]      History     Chief Complaint   Patient presents with    Urinary Symptoms     Stated Complaint: UTI    Subjective:   HPI      Patient is a pleasant 44-year-old male  here for evaluation of urinary urgency and reports mild discomfort at the base of his penis.  Denies dysuria, hematuria, lower abdominal pain, flank pain, fever, chills or nausea.  Denies contact with irritant.  Denies penile discharge.    Objective:     Past Medical History:    Allergic rhinitis    Anesthesia complication    woke up during procedure    Anxiety state    Arthritis    Back pain    Back problem    C. difficile diarrhea    Cirrhosis of liver (HCC)    2020    Cyclical vomiting    Depression    Hemochromatosis    Hepatitis    High cholesterol    Hx of motion sickness    sMALL BOATS    Hyperbilirubinemia    Migraines    Osteoarthritis    Pneumonia due to organism              Past Surgical History:   Procedure Laterality Date    Dental surgery procedure      Egd      Eye surgery  2012    ICL    Intraocular lens insertion Bilateral 2012    Sinus surgery        septoplasty    Upper gi endoscopy,exam      [place] teeth removed                  Social History     Socioeconomic History    Marital status:    Tobacco Use    Smoking status: Former     Types: Cigars     Quit date: 5/7/2009     Years since quitting: 15.5    Smokeless tobacco: Current     Types: Chew    Tobacco comments:     Wife smokes, some passive exposure. ; chews tobacco daily   Vaping Use    Vaping status: Never Used   Substance and Sexual Activity    Alcohol use: Not Currently     Alcohol/week: 2.5 standard drinks of alcohol     Types: 3 Standard drinks or equivalent per week    Drug use: Yes     Types: Cannabis     Comment: edibles   Other Topics Concern    Caffeine Concern Yes     Comment: 1-2 cup of coffee a day    Exercise No   Social History Narrative    Olvin is  x 2yrs. Father of 2 step children. He works as  a  in the finance. He and his family live here in [place], [place]               Review of Systems    Positive for stated complaint: UTI  Other systems are as noted in HPI.  Constitutional and vital signs reviewed.      All other systems reviewed and negative except as noted above.    Physical Exam     ED Triage Vitals   BP 12/05/24 1258 (!) 123/91   Pulse 12/05/24 1258 80   Resp 12/05/24 1258 18   Temp 12/05/24 1332 98.3 °F (36.8 °C)   Temp src 12/05/24 1332 Temporal   SpO2 12/05/24 1258 97 %   O2 Device 12/05/24 1258 None (Room air)       Current Vitals:   Vital Signs  BP: (!) 123/91  Pulse: 80  Resp: 18  Temp: 98.3 °F (36.8 °C)  Temp src: Temporal    Oxygen Therapy  SpO2: 97 %  O2 Device: None (Room air)        Physical Exam  Vitals and nursing note reviewed.   Constitutional:       Appearance: Normal appearance.   HENT:      Mouth/Throat:      Mouth: Mucous membranes are moist.   Eyes:      Conjunctiva/sclera: Conjunctivae normal.      Pupils: Pupils are equal, round, and reactive to light.   Cardiovascular:      Rate and Rhythm: Normal rate.      Pulses: Normal pulses.   Abdominal:      General: Bowel sounds are normal. There is no distension.      Palpations: Abdomen is soft.      Tenderness: There is no abdominal tenderness. There is no right CVA tenderness, left CVA tenderness or guarding.   Neurological:      Mental Status: He is alert.           ED Course     Labs Reviewed   [facility] POCT URINALYSIS DIPSTICK - Abnormal; Notable for the following components:       Result Value    Urine Clarity Cloudy (*)     Protein urine 30 (*)     Ketone, Urine Trace (*)     Bilirubin, Urine Small (*)     Blood, Urine Moderate (*)     All other components within normal limits   URINE CULTURE, ROUTINE                 MDM              Medical Decision Making  Differentials include but are not limited to UTI, urethritis, kidney stone and prostatitis.  Urinalysis reveals protein, blood, ketones and urobili.  Will  start Cipro for suspected prostatitis, Urine culture pending.  Close outpatient follow up with urology.  Strict ER precautions, patient agrees with plan of care, all questions answered to patient's satisfaction.    Amount and/or Complexity of Data Reviewed  Labs: ordered. Decision-making details documented in ED Course.        Disposition and Plan     Clinical Impression:  1. Urinary urgency    2. Hematuria, unspecified type         Disposition:  Discharge  12/5/2024  1:36 pm    Follow-up:  Genevieve Harrell MD  [address] [place] [id number]  [phone]    Schedule an appointment as soon as possible for a visit             Medications Prescribed:  Discharge Medication List as of 12/5/2024  1:36 PM        START taking these medications    Details   ciprofloxacin 500 MG Oral Tab Take 1 tablet (500 mg total) by mouth 2 (two) times daily for 14 days., Normal, Disp-28 tablet, R-0                 Supplementary Documentation:

## 2024-12-10 RX ORDER — BUSPIRONE HYDROCHLORIDE 10 MG/1
10 TABLET ORAL DAILY
Qty: 30 TABLET | Refills: 0 | OUTPATIENT
Start: 2024-12-10

## 2024-12-10 NOTE — TELEPHONE ENCOUNTER
Please review.  Protocol failed / Has no protocol.  Last visit was Telemedicine visit on 4/1/2024    GÓMEZ Damian refused on 10/19/24, encounter was left open, patient did not get the refusal.    [employer] message sent to patient to schedule an office visit with Primary Care Provider.   Will also route to Call Center to make a phone attempt.     Requested Prescriptions   Pending Prescriptions Disp Refills    busPIRone 10 MG Oral Tab 30 tablet 0     Sig: TAKE 1 TABLET BY MOUTH EVERY DAY.   **Appointment needed for further refills.       There is no refill protocol information for this order        Future Appointments         Provider Department Appt Notes    [facility] 1 [facility] from today's visit to immediate care with possible prostatitis          Recent Outpatient Visits              8 months ago Other fatigue    [facility], [place], Ninel, MD    Telemedicine    1 year ago Physical exam, annual    [facility], [place], [place], MD    Office Visit    1 year ago Hydrocele, left    [facility]    Office Visit    1 year ago [facility]cele, left    [facility]    [facility]    1 year ago Hydrocele, unspecified hydrocele type    [facility]    [facility] Visit

## 2024-12-18 ENCOUNTER — HOSPITAL ENCOUNTER (OUTPATIENT)
Dept: GENERAL RADIOLOGY | Age: 44
Discharge: HOME | End: 2024-12-18
Attending: INTERNAL MEDICINE
Payer: COMMERCIAL

## 2024-12-18 ENCOUNTER — OFFICE VISIT (OUTPATIENT)
Dept: INTERNAL MEDICINE CLINIC | Facility: CLINIC | Age: 44
End: 2024-12-18

## 2024-12-18 ENCOUNTER — LAB ENCOUNTER (OUTPATIENT)
Dept: LAB | Age: 44
End: 2024-12-18
Attending: INTERNAL MEDICINE
Payer: COMMERCIAL

## 2024-12-18 VITALS
HEIGHT: 73 IN | BODY MASS INDEX: 22.62 KG/M2 | WEIGHT: 170.69 LBS | DIASTOLIC BLOOD PRESSURE: 87 MMHG | HEART RATE: 91 BPM | SYSTOLIC BLOOD PRESSURE: 121 MMHG

## 2024-12-18 DIAGNOSIS — M25.561 ACUTE PAIN OF RIGHT KNEE: ICD-10-CM

## 2024-12-18 DIAGNOSIS — R39.9 URINARY SYMPTOM OR SIGN: ICD-10-CM

## 2024-12-18 DIAGNOSIS — M25.551 PAIN OF RIGHT HIP: ICD-10-CM

## 2024-12-18 DIAGNOSIS — F32.4 MAJOR DEPRESSIVE DISORDER WITH SINGLE EPISODE, IN PARTIAL REMISSION (HCC): ICD-10-CM

## 2024-12-18 DIAGNOSIS — Z00.00 PHYSICAL EXAM, ANNUAL: Primary | ICD-10-CM

## 2024-12-18 DIAGNOSIS — Z14.8 HEMOCHROMATOSIS CARRIER: ICD-10-CM

## 2024-12-18 LAB
ALBUMIN SERPL-MCNC: 5.2 G/DL (ref 3.2–4.8)
ALBUMIN/GLOB SERPL: 1.8 {RATIO} (ref 1–2)
ALP LIVER SERPL-CCNC: 60 U/L
ALT SERPL-CCNC: 13 U/L
ANION GAP SERPL CALC-SCNC: 5 MMOL/L (ref 0–18)
AST SERPL-CCNC: 17 U/L (ref ?–34)
BASOPHILS # BLD AUTO: 0.04 X10(3) UL (ref 0–0.2)
BASOPHILS NFR BLD AUTO: 0.6 %
BILIRUB SERPL-MCNC: 1.4 MG/DL (ref 0.3–1.2)
BILIRUB UR QL: NEGATIVE
BUN BLD-MCNC: 22 MG/DL (ref 9–23)
BUN/CREAT SERPL: 17.3 (ref 10–20)
CALCIUM BLD-MCNC: 10.2 MG/DL (ref 8.7–10.4)
CHLORIDE SERPL-SCNC: 110 MMOL/L (ref 98–112)
CHOLEST SERPL-MCNC: 232 MG/DL (ref ?–200)
CO2 SERPL-SCNC: 28 MMOL/L (ref 21–32)
COLOR UR: YELLOW
CREAT BLD-MCNC: 1.27 MG/DL
CRP SERPL-MCNC: <0.4 MG/DL (ref ?–1)
DEPRECATED HBV CORE AB SER IA-ACNC: 109 NG/ML
DEPRECATED RDW RBC AUTO: 39.9 FL (ref 35.1–46.3)
EGFRCR SERPLBLD CKD-EPI 2021: 72 ML/MIN/1.73M2 (ref 60–?)
EOSINOPHIL # BLD AUTO: 0.05 X10(3) UL (ref 0–0.7)
EOSINOPHIL NFR BLD AUTO: 0.7 %
ERYTHROCYTE [DISTWIDTH] IN BLOOD BY AUTOMATED COUNT: 12.2 % (ref 11–15)
ERYTHROCYTE [SEDIMENTATION RATE] IN BLOOD: 5 MM/HR
FASTING PATIENT LIPID ANSWER: YES
FASTING STATUS PATIENT QL REPORTED: YES
GLOBULIN PLAS-MCNC: 2.9 G/DL (ref 2–3.5)
GLUCOSE BLD-MCNC: 82 MG/DL (ref 70–99)
GLUCOSE UR-MCNC: NORMAL MG/DL
HCT VFR BLD AUTO: 44.7 %
HDLC SERPL-MCNC: 50 MG/DL (ref 40–59)
HGB BLD-MCNC: 15.4 G/DL
IMM GRANULOCYTES # BLD AUTO: 0.02 X10(3) UL (ref 0–1)
IMM GRANULOCYTES NFR BLD: 0.3 %
KETONES UR-MCNC: NEGATIVE MG/DL
LDLC SERPL CALC-MCNC: 170 MG/DL (ref ?–100)
LEUKOCYTE ESTERASE UR QL STRIP.AUTO: 25
LYMPHOCYTES # BLD AUTO: 1.58 X10(3) UL (ref 1–4)
LYMPHOCYTES NFR BLD AUTO: 23.5 %
MCH RBC QN AUTO: 30.8 PG (ref 26–34)
MCHC RBC AUTO-ENTMCNC: 34.5 G/DL (ref 31–37)
MCV RBC AUTO: 89.4 FL
MONOCYTES # BLD AUTO: 0.33 X10(3) UL (ref 0.1–1)
MONOCYTES NFR BLD AUTO: 4.9 %
NEUTROPHILS # BLD AUTO: 4.71 X10 (3) UL (ref 1.5–7.7)
NEUTROPHILS # BLD AUTO: 4.71 X10(3) UL (ref 1.5–7.7)
NEUTROPHILS NFR BLD AUTO: 70 %
NITRITE UR QL STRIP.AUTO: NEGATIVE
NONHDLC SERPL-MCNC: 182 MG/DL (ref ?–130)
OSMOLALITY SERPL CALC.SUM OF ELEC: 298 MOSM/KG (ref 275–295)
PH UR: 5.5 [PH] (ref 5–8)
PLATELET # BLD AUTO: 209 10(3)UL (ref 150–450)
POTASSIUM SERPL-SCNC: 4.6 MMOL/L (ref 3.5–5.1)
PROT SERPL-MCNC: 8.1 G/DL (ref 5.7–8.2)
RBC # BLD AUTO: 5 X10(6)UL
SODIUM SERPL-SCNC: 143 MMOL/L (ref 136–145)
SP GR UR STRIP: >1.03 (ref 1–1.03)
TRIGL SERPL-MCNC: 69 MG/DL (ref 30–149)
TSI SER-ACNC: 1.25 UIU/ML (ref 0.55–4.78)
URATE SERPL-MCNC: 5.4 MG/DL
UROBILINOGEN UR STRIP-ACNC: NORMAL
VLDLC SERPL CALC-MCNC: 14 MG/DL (ref 0–30)
WBC # BLD AUTO: 6.7 X10(3) UL (ref 4–11)

## 2024-12-18 PROCEDURE — 82728 ASSAY OF FERRITIN: CPT | Performed by: INTERNAL MEDICINE

## 2024-12-18 PROCEDURE — 84550 ASSAY OF BLOOD/URIC ACID: CPT | Performed by: INTERNAL MEDICINE

## 2024-12-18 PROCEDURE — 85025 COMPLETE CBC W/AUTO DIFF WBC: CPT | Performed by: INTERNAL MEDICINE

## 2024-12-18 PROCEDURE — 73502 X-RAY EXAM HIP UNI 2-3 VIEWS: CPT | Performed by: INTERNAL MEDICINE

## 2024-12-18 PROCEDURE — 81001 URINALYSIS AUTO W/SCOPE: CPT | Performed by: INTERNAL MEDICINE

## 2024-12-18 PROCEDURE — 84443 ASSAY THYROID STIM HORMONE: CPT | Performed by: INTERNAL MEDICINE

## 2024-12-18 PROCEDURE — 99396 PREV VISIT EST AGE 40-64: CPT | Performed by: INTERNAL MEDICINE

## 2024-12-18 PROCEDURE — 73564 X-RAY EXAM KNEE 4 OR MORE: CPT | Performed by: INTERNAL MEDICINE

## 2024-12-18 PROCEDURE — 87086 URINE CULTURE/COLONY COUNT: CPT | Performed by: INTERNAL MEDICINE

## 2024-12-18 PROCEDURE — 80061 LIPID PANEL: CPT | Performed by: INTERNAL MEDICINE

## 2024-12-18 PROCEDURE — 36415 COLL VENOUS BLD VENIPUNCTURE: CPT | Performed by: INTERNAL MEDICINE

## 2024-12-18 PROCEDURE — 80053 COMPREHEN METABOLIC PANEL: CPT | Performed by: INTERNAL MEDICINE

## 2024-12-18 PROCEDURE — 85652 RBC SED RATE AUTOMATED: CPT | Performed by: INTERNAL MEDICINE

## 2024-12-18 PROCEDURE — 86140 C-REACTIVE PROTEIN: CPT | Performed by: INTERNAL MEDICINE

## 2024-12-18 RX ORDER — BUSPIRONE HYDROCHLORIDE 10 MG/1
TABLET ORAL
Qty: 90 TABLET | Refills: 3 | Status: SHIPPED | OUTPATIENT
Start: 2024-12-18

## 2024-12-24 RX ORDER — ESCITALOPRAM OXALATE 5 MG/1
5 TABLET ORAL DAILY
Qty: 90 TABLET | Refills: 1 | OUTPATIENT
Start: 2024-12-24

## 2024-12-25 NOTE — PROGRESS NOTES
Subjective:     Patient ID: Marcell Weiss is a 44 year old male.  Presents for physical exam  HPI  Patient reports that overall he is that he has been doing fair until several weeks ago started to experience right hip pain and right knee pain which interfere with daily activities pain is pretty intense, he was told that he is developing arthritis in the hip in the past, physical therapy helped he would like to try it again.  He is being followed at the pain center.  Anxiety and depression controlled on Lexapro and BuSpar needs refill on medications.  He is experiencing frequency of urination will be seeing urology soon    He is staying sober no alcohol for several years    Review of Systems       Constitutional:  Negative for decreased activity, fever, irritability and lethargy  Cardiovascular:  Negative for chest pain and irregular heartbeat/palpitations  Respiratory:  Negative for cough, dyspnea and wheezing.  Eyes:  Negative for eye discharge and vision loss  Endocrine:  Negative for polydipsia and polyphagia  Integumentary:  Negative for pruritus and rash  Neurological:  Negative for gait disturbance, paresthesias.   Psychiatric:  Negative for inappropriate interaction and psychiatric symptom  Current Outpatient Medications   Medication Sig Dispense Refill    busPIRone 10 MG Oral Tab TAKE 1 TABLET BY MOUTH EVERY DAY 90 tablet 3    escitalopram 10 MG Oral Tab Take 1 tablet (10 mg total) by mouth daily. This is correct dose 90 tablet 0    escitalopram 5 MG Oral Tab Take 1 tablet (5 mg total) by mouth daily. 90 tablet 1    azelastine 0.1 % Nasal Solution 1 spray by Nasal route 2 (two) times daily. 3 each 1    fluticasone propionate 50 MCG/ACT Nasal Suspension 2 sprays by Each Nare route daily. 3 each 1    montelukast 10 MG Oral Tab Take 1 tablet (10 mg total) by mouth nightly. 90 tablet 3    levocetirizine 5 MG Oral Tab Take 1 tablet (5 mg total) by mouth every evening. 90 tablet 3    Azelastine-Fluticasone  (DYMISTA) 137-50 MCG/ACT Nasal Suspension 1 Squirt by Nasal route 2 (two) times daily. 3 each 1    montelukast (SINGULAIR) 10 MG Oral Tab Take 1 tablet (10 mg total) by mouth daily. 90 tablet 3    ergocalciferol 1.25 MG (97317 UT) Oral Cap Take 1 capsule (50,000 Units total) by mouth once a week.      levocetirizine 5 MG Oral Tab Take 1 tablet (5 mg total) by mouth every evening. 90 tablet 3    Azelastine-Fluticasone (DYMISTA) 137-50 MCG/ACT Nasal Suspension 1 spray by Nasal route 2 (two) times daily. 3 each 1    busPIRone 10 MG Oral Tab        Allergies:Allergies[1]    Past Medical History:    Allergic rhinitis    Anesthesia complication    woke up during procedure    Anxiety state    Arthritis    Back pain    Back problem    C. difficile diarrhea    Cirrhosis of liver (HCC)    2020    Cyclical vomiting    Depression    Hemochromatosis    Hepatitis    High cholesterol    Hx of motion sickness    sMALL BOATS    Hyperbilirubinemia    Migraines    Osteoarthritis    Pneumonia due to organism      Past Surgical History:   Procedure Laterality Date    Dental surgery procedure      Egd      Eye surgery  2012    ICL    Intraocular lens insertion Bilateral 2012    Sinus surgery        septoplasty    Upper gi endoscopy,exam      [place] teeth removed        Family History   Problem Relation Age of Onset    Alcohol and Other Disorders Associated Father     Cataracts Mother     Anxiety Mother     Depression Mother     Cancer Other 76        maternal great uncle; unknown etiology    Bleeding Disorders Neg     Clotting Disorder Neg     Anemia Neg       Social History:   Social History     Socioeconomic History    Marital status:    Tobacco Use    Smoking status: Former     Types: Cigars     Quit date: 5/7/2009     Years since quitting: 15.6     Passive exposure: Past    Smokeless tobacco: Current     Types: Chew    Tobacco comments:     Wife smokes, some passive exposure. ; chews tobacco daily   Vaping Use    Vaping  status: Never Used   Substance and Sexual Activity    Alcohol use: Not Currently     Alcohol/week: 2.5 standard drinks of alcohol     Types: 3 Standard drinks or equivalent per week    Drug use: Yes     Types: Cannabis     Comment: edibles   Other Topics Concern    Caffeine Concern Yes     Comment: 1-2 cup of coffee a day    Exercise No   Social History Narrative    Olvin is  x 2yrs. Father of 2 step children. He works as a  in the finance. He and his family live here in [place], [place]         /87 (BP Location: Left arm, Patient Position: Sitting, Cuff Size: adult)   Pulse 91   Ht 6' 1\" (1.854 m)   Wt 170 lb 11.2 oz (77.4 kg)   BMI 22.52 kg/m²    Physical Exam  Constitutional:       Appearance: Normal appearance. He is not ill-appearing.   HENT:      Head: Normocephalic and atraumatic.      Right Ear: Tympanic membrane, ear canal and external ear normal.      Left Ear: Tympanic membrane, ear canal and external ear normal.   Eyes:      General: No scleral icterus.     Extraocular Movements: Extraocular movements intact.      Conjunctiva/sclera: Conjunctivae normal.      Pupils: Pupils are equal, round, and reactive to light.   Neck:      Vascular: No carotid bruit.   Cardiovascular:      Rate and Rhythm: Normal rate and regular rhythm.      Heart sounds: No murmur heard.     No gallop.   Pulmonary:      Effort: Pulmonary effort is normal.      Breath sounds: No wheezing or rhonchi.   Abdominal:      General: Bowel sounds are normal.      Tenderness: There is no abdominal tenderness. There is no right CVA tenderness or left CVA tenderness.      Hernia: No hernia is present.   Musculoskeletal:         General: Normal range of motion.      Cervical back: Normal range of motion and neck supple.      Right lower leg: No edema.      Left lower leg: No edema.   Lymphadenopathy:      Cervical: No cervical adenopathy.   Skin:     General: Skin is warm.      Coloration: Skin is not jaundiced.    Neurological:      General: No focal deficit present.      Mental Status: He is alert and oriented to person, place, and time. Mental status is at baseline.   Psychiatric:         Mood and Affect: Mood normal.         Behavior: Behavior normal.         Thought Content: Thought content normal.         Assessment & Plan:   1. Physical exam, annual    2. Pain of right hip x-ray of the kneeget x-ray of the hips physical therapy ordered see physiatry   3. Acute pain of right knee see physiatry check labs ferritin level CBC CMP, rule out inflammatory arthritis will check sed rate C-reactive protein and rheumatoid factor   4. Hemochromatosis carrier, check lipids and TSH with reflex   5. Major depressive disorder with single episode, in partial remission (HCC) continue Lexapro and BuSpar follow-up in 6 months sooner if needed   6. Urinary symptom or sign        Orders Placed This Encounter   Procedures    CBC With Differential With Platelet    Comp Metabolic Panel (14)    Lipid Panel    TSH W Reflex To Free T4    Uric Acid    Urinalysis with Culture Reflex [E]    FERRITIN [457] [Q]    Sed Rate, [place] (Automated)    C-Reactive Protein    Urine Culture, Routine       Meds This Visit:  Requested Prescriptions     Signed Prescriptions Disp Refills    busPIRone 10 MG Oral Tab 90 tablet 3     Sig: TAKE 1 TABLET BY MOUTH EVERY DAY       Imaging & Referrals:  PHYSICAL THERAPY EXTERNAL  PHYSIATRY - INTERNAL            [1]   Allergies  Allergen Reactions    Pollen OTHER (SEE COMMENTS)     Sneezing and runny nose    Dander ITCHING and OTHER (SEE COMMENTS)     Sneezing with cats and dogs

## 2024-12-27 ENCOUNTER — TELEPHONE (OUTPATIENT)
Dept: INTERNAL MEDICINE CLINIC | Facility: CLINIC | Age: 44
End: 2024-12-27

## 2024-12-27 DIAGNOSIS — R89.9 ABNORMAL LABORATORY TEST: Primary | ICD-10-CM

## 2024-12-27 NOTE — TELEPHONE ENCOUNTER
Dr. Cm patient has read your Quick Key message. I was not able to see the orders. I have pended the CMP if this is the only one you need please sign pended order. Thank you              This looks abnormal looks like you dehydrated slightly increase fluid intake.  Culture is negative, see urologist as you are planning.  Normal kidney liver function test, slightly elevated bilirubin significance unknown.  I will place order you can recheck liver test which includes bilirubin in 1 month.  Cholesterol is much higher than previously try to follow healthy diet low-fat low-cholesterol.  Normal ferritin level and thyroid test.  Inflammatory markers are negative, uric acid level is normal.  No anemia.  Feel free to give my office a call if you have questions  Urine culture is negative.  Feel free to give my office a call if you have questions   Written by Dorothy Cm MD on 12/20/2024  8:32 AM CST  Seen by patient Marcell Weiss on 12/20/2024  5:27 PM

## 2025-01-15 ENCOUNTER — OFFICE VISIT (OUTPATIENT)
Dept: SURGERY | Facility: CLINIC | Age: 45
End: 2025-01-15
Payer: COMMERCIAL

## 2025-01-15 DIAGNOSIS — R39.9 LOWER URINARY TRACT SYMPTOMS: Primary | ICD-10-CM

## 2025-01-15 PROCEDURE — 99213 OFFICE O/P EST LOW 20 MIN: CPT | Performed by: UROLOGY

## 2025-01-15 NOTE — PROGRESS NOTES
Genevieve Harrell MD  Department of Urology  [address] [place] [address] Rd[address]  [place] [address]    T: [phone]  F: [phone]    To: Dorothy Cm MD   [address] [place] [address]    Re: Marcell Weiss   MRN: [id number]  : 1980    Dear Dorothy Cm MD,    Today I had the pleasure of seeing Marcell Weiss in my clinic. As you know, Mr. Weiss is a pleasant 44 year old year old male who I am seeing for lower urinary tract symptoms. Patient was last seen in this department on Visit date not found.    Briefly, patient underwent hydrocelectomy me 2023. He has done well postoperatively. He reports that he had some swelling after the procedure but it is now subsiding. Please note that his pathology demonstrated chronic inflammation.     Today he states that he is having frequency of urination and is worried about possible prostatitis.  He did have a urine culture that was negative, urine analysis demonstrated dehydration and no microscopic hematuria.  Prior urine cultures were also negative.    Has no issues today voiding or otherwise.       PAST MEDICAL HISTORY:  Past Medical History:    Allergic rhinitis    Anesthesia complication    woke up during procedure    Anxiety state    Arthritis    Back pain    Back problem    C. difficile diarrhea    Cirrhosis of liver (HCC)    2020    Cyclical vomiting    Depression    Hemochromatosis    Hepatitis    High cholesterol    Hx of motion sickness    sMALL BOATS    Hyperbilirubinemia    Migraines    Osteoarthritis    Pneumonia due to organism        PAST SURGICAL HISTORY:  Past Surgical History:   Procedure Laterality Date    Dental surgery procedure      Egd      Eye surgery  2012    ICL    Intraocular lens insertion Bilateral 2012    Sinus surgery        septoplasty    Upper gi endoscopy,exam      [place] teeth removed           ALLERGIES:  Allergies[1]      MEDICATIONS:  Current Outpatient Medications   Medication Instructions    azelastine  0.1 % Nasal Solution 1 spray, Nasal, 2 times daily    Azelastine-Fluticasone (DYMISTA) 137-50 MCG/ACT Nasal Suspension 1 spray, Nasal, 2 times daily    Azelastine-Fluticasone (DYMISTA) 137-50 MCG/ACT Nasal Suspension 1 Squirt, Nasal, 2 times daily    busPIRone 10 MG Oral Tab No dose, route, or frequency recorded.    busPIRone 10 MG Oral Tab TAKE 1 TABLET BY MOUTH EVERY DAY    ergocalciferol (VITAMIN D2) 50,000 Units, Weekly    escitalopram (LEXAPRO) 5 mg, Oral, Daily    escitalopram (LEXAPRO) 10 mg, Oral, Daily, This is correct dose    fluticasone propionate 50 MCG/ACT Nasal Suspension 2 sprays, Each Nare, Daily    levocetirizine (XYZAL) 5 mg, Oral, Every evening    levocetirizine (XYZAL) 5 mg, Oral, Every evening    montelukast (SINGULAIR) 10 mg, Oral, Nightly    montelukast (SINGULAIR) 10 mg, Oral, Daily        FAMILY HISTORY:  Family History   Problem Relation Age of Onset    Alcohol and Other Disorders Associated Father     Cataracts Mother     Anxiety Mother     Depression Mother     Cancer Other 76        maternal great uncle; unknown etiology    Bleeding Disorders Neg     Clotting Disorder Neg     Anemia Neg         SOCIAL HISTORY:  Social History     Socioeconomic History    Marital status:    Tobacco Use    Smoking status: Former     Types: Cigars     Quit date: 5/7/2009     Years since quitting: 15.7     Passive exposure: Past    Smokeless tobacco: Current     Types: Chew    Tobacco comments:     Wife smokes, some passive exposure. ; chews tobacco daily   Vaping Use    Vaping status: Never Used   Substance and Sexual Activity    Alcohol use: Not Currently     Alcohol/week: 2.5 standard drinks of alcohol     Types: 3 Standard drinks or equivalent per week    Drug use: Yes     Types: Cannabis     Comment: edibles   Other Topics Concern    Caffeine Concern Yes     Comment: 1-2 cup of coffee a day    Exercise No   Social History Narrative    Olvin is  x 2yrs. Father of 2 step children. He works as  a  in the [employer]. He and his family live here in [place], [place]           PHYSICAL EXAMINATION:  There were no vitals filed for this visit.  CONSTITUTIONAL: No apparent distress, cooperative and communicative  NEUROLOGIC: Alert and oriented   HEAD: Normocephalic, atraumatic   EYES: Sclera non-icteric   ENT: Hearing intact, moist mucous membranes   NECK: No obvious goiter or masses   RESPIRATORY: Normal respiratory effort, Nonlabored breathing on room air  SKIN: No evident rashes   ABDOMEN: Soft, nontender, nondistended, no rebound tenderness, no guarding, no masses      REVIEW OF SYSTEMS:    A comprehensive 10-point review of systems was completed.  Pertinent positives and negatives are noted in the the HPI.       LABORATORY DATA:  1 Result Note       1 Patient Communication  URINE CULTURE No Growth at 18-24 hrs.        Resulting Agency: [facility] ([facility])           IMAGING REVIEW:  Narrative   PROCEDURE: US SCROTUM W/DOPPLER (CPT=93975/79376)     COMPARISON: None.     INDICATIONS: Left testicular swelling.     TECHNIQUE: The scrotum was evaluated with gray scale and color duplex Doppler sonography.     FINDINGS:     RIGHT  TESTICLE: 5.1 x 2.7 x 2.9 cm.  Normal echogenicity.  No masses.    EPIDIDYMIS: Normal size and echogenicity.    OTHER: There is a large anechoic hydrocele measuring 7.7 x 4.8 x 5.6 cm.  No varicocele.  DOPPLER: Normal arterial and venous flow in the testicle with color and pulsed Doppler.  Normal epididymal flow.       LEFT  TESTICLE: 4.1 x 2.2 x 2.7 cm.  Normal echogenicity.  No masses.    EPIDIDYMIS: Normal size and echogenicity.    OTHER: Negative.  No varicocele or hydrocele.    DOPPLER: Normal arterial and venous flow in the testicle with color pulsed Doppler.  Normal epididymal flow.                 Impression   CONCLUSION:  1. Large simple right hydrocele, which is nonspecific but is often idiopathic.  2. Otherwise unremarkable grayscale ultrasound and Doppler assessment of  the testes/epididymides.        Elm-remote     Dictated by (CST): Luciano Cardenas MD on 6/03/2023 at 2:03 PM      Finalized by (CST): Luciano Cardenas MD on 6/03/2023 at 2:06 PM              OTHER RELEVANT DATA:   none     IMPRESSION: Lower urinary tract symptoms with negative UA and culture with symptoms now resolved-recommended behavioral management as listed below.  Can consider tamsulosin 0.4 mg nightly if develops persistent lower urinary tract symptoms.     Behavioral management with timed voiding, double voiding, avoiding bladder irritants (coffee, tea, soda/pop, alcohol), voiding prior to bedtime, avoiding fluids 2-4 hours prior to bedtime, compression stockings and elevation of feet       PLAN:  Behavioral management  RTC if symptoms persist/recur    Thank you for referring this very pleasant patient to my clinic. If you have any questions or concerns, please do not hesitate to contact me.    Sincerely,  Genevieve Harrell MD    20 minutes were spent on this patient at this visit obtaining a history, reviewing medical records, developing a treatment plan, counseling and discussing treatment strategy with patient, coordination of care and documentation.     The 21st Century Cures Act makes medical notes available to patients in the interest of transparency.  However, please be advised that this is a medical document.  It is intended as a peer to peer communication.  It is written in medical language and may contain abbreviations or verbiage that are technical and unfamiliar.  It may appear blunt or direct.  Medical documents are intended to carry relevant information, facts as evident, and the clinical opinion of the practitioner.         [1]   Allergies  Allergen Reactions    Pollen OTHER (SEE COMMENTS)     Sneezing and runny nose    Dander ITCHING and OTHER (SEE COMMENTS)     Sneezing with cats and dogs

## 2025-01-16 ENCOUNTER — TELEPHONE (OUTPATIENT)
Dept: FAMILY MEDICINE CLINIC | Facility: CLINIC | Age: 45
End: 2025-01-16

## 2025-01-16 ENCOUNTER — MED REC SCAN ONLY (OUTPATIENT)
Dept: FAMILY MEDICINE CLINIC | Facility: CLINIC | Age: 45
End: 2025-01-16

## 2025-01-16 NOTE — TELEPHONE ENCOUNTER
Received Patient history from [facility] will leave for  to review and send to scanning

## 2025-02-07 RX ORDER — AZELASTINE HYDROCHLORIDE 137 UG/1
1 SPRAY, METERED NASAL 2 TIMES DAILY
Qty: 1 EACH | Refills: 5 | Status: SHIPPED | OUTPATIENT
Start: 2025-02-07

## 2025-02-07 NOTE — TELEPHONE ENCOUNTER
Refill passed per [facility] protocol.    Requested Prescriptions   Pending Prescriptions Disp Refills    AZELASTINE 137 MCG/SPRAY Nasal Solution [Pharmacy Med Name: AZELASTINE 0.1% (137 MCG) SPRY]  5     Sig: SPRAY 1 SPRAY INTO EACH NOSTRIL TWICE A DAY       Allergy Medication Protocol Passed - 2/7/2025  7:36 AM        Passed - In person appointment or virtual visit in the past 12 mos or appointment in next 3 mos     Recent Outpatient Visits              3 weeks ago [facility]    1 month ago Physical exam, annual    [facility] [place] Kandel, Ninel, MD    Office Visit    10 months ago Other fatigue    [facility] [place] Kandel, Ninel, MD    Telemedicine    1 year ago Physical exam, annual    [facility] [place] Kandel, Ninel, MD    Office Visit    1 year ago Hydrocnuno, left    [facility]    [facility]                      Passed - Medication is active on med list             Recent Outpatient Visits              3 weeks ago [facility] urinary tract symptoms    [facility]    [facility]    1 month ago Physical exam, annual    [facility] [place] Kandel, Ninel, MD    Office Visit    10 months ago Other fatigue    [facility] [place] K[facility], Ninel, MD    Telemedicine    1 year ago Physical exam, annual    [facility] [place] Kandel, Ninel, MD    Office Visit    1 year ago Hydrocele, [facility], Genevieve Driver MD    Office Visit

## 2025-02-28 RX ORDER — ESCITALOPRAM OXALATE 10 MG/1
10 TABLET ORAL DAILY
Qty: 90 TABLET | Refills: 1 | Status: SHIPPED | OUTPATIENT
Start: 2025-02-28

## 2025-03-04 NOTE — TELEPHONE ENCOUNTER
Ear Tubes                          Medications used with general anesthesia may stay in your system 24 hours following the procedure. These may cause you to feel sleepy or drowsy.       DIET:  Regular diet, no restriction. Start with easy to swallow foods and progress as tolerated.       NAUSEA AND VOMITING:  Nausea and vomiting may occur as you become more active or begin to increase food intake. If this should happen simply decrease activities and return to clear liquids. If the problem should persist longer than 24 - 48 hours, call your surgeon.       PAIN:       You may have some pain after your surgery.        May use tylenol and ibuprofen as directed on bottle.       MEDICATIONS:       If given ear drops by Dr. Scott- use 4 drops in each ear twice daily for 5 days.       BATHING:  OK tomorrow. Keep head above water for 3 weeks.        RETURN TO WORK/SCHOOL:  Return to school/  in 0-1 day.       ACTIVITY:  Gradually return to normal activity beginning the day after surgery.       WOUND CARE:  No water in ears if vents tubes were placed today.       WHEN TO CALL THE SURGEON  -Pain not controlled by pain medications.  -Bleeding       You Will have a follow up appointment in 3 weeks. This has already been scheduled.        If any questions arise call your surgeon first:  Dr. Scott, at telephone number [phone].    Yesterday's visit canceled due to the coronavirus pandemic. I called  Rodolfo Nissa today to check in. Overall, things are about the same. Ramandeep Aleman describes some vomiting past week; but not the cyclical vomiting. He describes nausea and gagging only.

## 2025-04-18 RX ORDER — LEVOCETIRIZINE DIHYDROCHLORIDE 5 MG/1
5 TABLET, FILM COATED ORAL EVERY EVENING
Qty: 90 TABLET | Refills: 3 | Status: SHIPPED | OUTPATIENT
Start: 2025-04-18

## 2025-04-18 NOTE — TELEPHONE ENCOUNTER
Refill passed per [facility] protocol.    Requested Prescriptions   Pending Prescriptions Disp Refills    LEVOCETIRIZINE 5 MG Oral Tab [Pharmacy Med Name: LEVOCETIRIZINE 5 MG TABLET] 90 tablet 3     Sig: TAKE 1 TABLET BY MOUTH EVERY DAY IN THE EVENING       Allergy Medication Protocol Passed - 4/18/2025  2:29 PM        Passed - In person appointment or virtual visit in the past 12 mos or appointment in next 3 mos     Recent Outpatient Visits              3 months [facility]    4 months ago Physical exam, annual    [facility] [place] Kandel, Ninel, MD    Office Visit    1 year ago Other fatigue    [facility] [place] Kandel, Ninel, MD    Telemedicine    1 year ago Physical exam, [facility] [place] Kandel, Ninel, MD    Office Visit    1 year ago Hydrocele, left    [facility], Genevieve Driver MD    Office Visit                      Passed - Medication is active on med list             Recent Outpatient Visits              3 months ago Lower urinary tract symptoms    [facility], Genevieve Driver MD    Office Visit    4 months ago Physical exam, annual    [facility] [place] Kandel, Ninel, MD    Office Visit    1 year ago Other [facility] [place] Kandel, Ninel, MD    Telemedicine    1 year ago Physical exam, annual    [facility] [place] Kandel, Ninel, MD    Office Visit    1 year ago Hydrocele, [facility], Genevieve Driver MD    Office Visit

## 2025-05-12 ENCOUNTER — MED REC SCAN ONLY (OUTPATIENT)
Dept: INTERNAL MEDICINE CLINIC | Facility: CLINIC | Age: 45
End: 2025-05-12

## 2025-05-16 ENCOUNTER — TELEPHONE (OUTPATIENT)
Dept: INTERNAL MEDICINE CLINIC | Facility: CLINIC | Age: 45
End: 2025-05-16

## 2025-07-02 ENCOUNTER — MED REC SCAN ONLY (OUTPATIENT)
Dept: INTERNAL MEDICINE CLINIC | Facility: CLINIC | Age: 45
End: 2025-07-02

## 2025-07-08 RX ORDER — FLUTICASONE PROPIONATE 50 MCG
2 SPRAY, SUSPENSION (ML) NASAL DAILY
Qty: 48 G | Refills: 3 | Status: SHIPPED | OUTPATIENT
Start: 2025-07-08

## 2025-07-08 RX ORDER — MONTELUKAST SODIUM 10 MG/1
10 TABLET ORAL DAILY
Qty: 90 TABLET | Refills: 3 | Status: SHIPPED | OUTPATIENT
Start: 2025-07-08

## 2025-07-08 NOTE — TELEPHONE ENCOUNTER
Medium  Duplicate Therapy: fluticasone propionate, Azelastine-FluticasoneNASAL STEROIDS. No Abuse/Dependency Potential.    Per 4/5/2024 telephone encounter:  Marcell Weiss    The Dymista wasn't covered, could you please send in the two alternative sprays instead?     MD Marcell Gibbs   I sent prescription for Astelin and Flonase

## 2025-07-08 NOTE — TELEPHONE ENCOUNTER
Refill passes per [facility] protocol.  No future appointments with primary care medicine

## 2025-08-20 RX ORDER — AZELASTINE HYDROCHLORIDE 137 UG/1
1 SPRAY, METERED NASAL 2 TIMES DAILY
Qty: 90 ML | Refills: 3 | Status: SHIPPED | OUTPATIENT
Start: 2025-08-20

## (undated) DEVICE — DRAPE SHEET LAPAROTOMY

## (undated) DEVICE — BANDAGE ROLL,100% COTTON, 6 PLY, LARGE: Brand: KERLIX

## (undated) DEVICE — PENROSE DRAIN 12" X 1/4: Brand: CARDINAL HEALTH

## (undated) DEVICE — OINTMENT CURAD BACITRACIN .3OZ

## (undated) DEVICE — SUT CHROMIC GUT 3-0 SH G122H

## (undated) DEVICE — GAMMEX® PI HYBRID SIZE 6, STERILE POWDER-FREE SURGICAL GLOVE, POLYISOPRENE AND NEOPRENE BLEND: Brand: GAMMEX

## (undated) DEVICE — Device

## (undated) DEVICE — MINOR GENERAL: Brand: MEDLINE INDUSTRIES, INC.

## (undated) DEVICE — SUT ETHILON 2-0 FS 664H

## (undated) DEVICE — NON-ADHERENT PAD PREPACK: Brand: TELFA

## (undated) DEVICE — SUT SILK 2-0 SA85H

## (undated) DEVICE — SUPPORTER ATHLETIC LG

## (undated) DEVICE — SOL NACL IRRIG 0.9% 1000ML BTL

## (undated) DEVICE — SUT VICRYL 3-0 SH J416H

## (undated) DEVICE — SLEEVE KENDALL SCD EXPRESS MED

## (undated) DEVICE — SUT CHROMIC GUT 4-0 SH G121H

## (undated) DEVICE — SKIN PREP TRAY 4 COMPARTM TRAY: Brand: MEDLINE INDUSTRIES, INC.

## (undated) DEVICE — DERMABOND CLOSURE 0.7ML TOPICL

## (undated) NOTE — ED AVS SNAPSHOT
Mr. Leslie Martin   MRN: [id number]    Department:  [facility] Emergency Department   Date of Visit:  8/13/2019           Disclosure     Insurance plans vary and the physician(s) referred by the ER may not be covered by your plan.  Please conta CARE PHYSICIAN AT ONCE OR RETURN IMMEDIATELY TO THE EMERGENCY DEPARTMENT. If you have been prescribed any medication(s), please fill your prescription right away and begin taking the medication(s) as directed.   If you believe that any of the medications

## (undated) NOTE — ED AVS SNAPSHOT
Mr. Josefina Howard   MRN: [id number]    Department:  [facility] Emergency Department   Date of Visit:  1/21/2020           Disclosure     Insurance plans vary and the physician(s) referred by the ER may not be covered by your plan.  Please conta CARE PHYSICIAN AT ONCE OR RETURN IMMEDIATELY TO THE EMERGENCY DEPARTMENT. If you have been prescribed any medication(s), please fill your prescription right away and begin taking the medication(s) as directed.   If you believe that any of the medications

## (undated) NOTE — IP AVS SNAPSHOT
Patient Demographics     Address  [address] [place] [address] Phone  [phone] [facility])  [phone] (Mobile) *Preferred* E-mail Address  [email or web address]      Emergency Contact(s)     Name Relation Home Work [address]  Take 1 tablet (10 mg total) by mouth nightly. Nunu Dy, APN         Levocetirizine Dihydrochloride 5 MG Tabs  Commonly known as:  Xyzal  Next dose due: Tonight 9 pm      Take 1 tablet (5 mg total) by mouth nightly.    Georgina Salazar MD         [facility] Take 3 tablets (15 mg total) by mouth daily for 7 days.   Stop taking on:  September 23, 2020   MaxRICHELLE Macias         prednisoLONE 5 MG Tabs  Commonly known as:  Melany Haywood  Start taking on:  September 24, 2020  Next dose due:  9/24/2020      Take 2 tabl Propranolol HCl 10 MG Tabs  Thiamine HCl 100 MG Tabs  vancomycin HCl 125 MG Caps     Please  your prescriptions at the location directed by your doctor or nurse    Bring a paper prescription for each of these medications  temazepam 15 MG Caps Patient Weight  61.2 kg (135 lb)         Lab Results Last 24 Hours      MANUAL DIFFERENTIAL [[id number]] (Abnormal)  Resulted: 07/30/20 1150, Result status: Final result   Ordering provider:  Sandra Cao PA-C  07/30/20 0706 Resulting lab:  [facility] blood smear demonstrates moderate macrocytic anemia with decreased absolute RBC count, mild neutrophilia, and mild monocytosis. Neutrophils consist predominately of mature forms, scattered band forms, and rare metamyelocytes.  Despite this finding, melody regional enteritis), non-Hodgkin's lymphomas, sarcoidosis, multiple myeloma, hemolytic anemia, post-splenectomy, immune thrombocytopenic purpura and myelodysplastic/myeloproliferative neoplasms. Clinical correlation is recommended.      Reviewed by Tony Rodriges Subhash Root M.D. [facility]  [facility] [address] 03/19/20 1442 - Present            Microbiology Results (All)     Procedure Component Value Units Date/Time    Body Fluid Cult Aerobic and Anaerobic Once [[id number]] Collected:  07/28/20 0941 Location 22/22 Attending Alem Cash MD     PCP Candace Ramos MD     ASSESSMENT/PLAN    Acute alcoholic hepatitis. Bilirubin and transaminases are quite high. INR elevated at 1.35. Patient is not encephalopathic.   He does have a distended gallbla AMITRIPTYLINE HCL 10 MG ORAL TAB    TAKE ONE TABLET BY MOUTH AT BEDTIME     AZELASTINE-FLUTICASONE (DYMISTA) 137-50 MCG/ACT NASAL SUSPENSION    1 Squirt by Nasal route 2 (two) times daily.     CHOLECALCIFEROL (VITAMIN D3) 1000 UNITS ORAL CAP    Take 1 tabl Drinks per session: 3 or 4        Binge frequency: Weekly        Comment: about 6 oz of vodka everyday; drinks more during the weekend       Drug use: No    Other Topics      Concerns:        Caffeine Concern: Yes          1-2 cup of coffee a day symmetric and 5 out of 5 throughout, sensory exam grossly intact, coordination and gait normal.  Skin: Jaundiced  MS: No open wounds, no joint effusions. No edema  Psych: Normal mood and affect.  Behavior and judgment normal.     Data:  Recent Labs   Lab 0 Record reviewed, communication with attending, communication with RN and patient seen face to face evaluation.     History of Present Illness:   Patient is a 45year old   male with history of alcoholism, depression, osteoarthritis and cycli agreement with his wife that he need to drink only after 4 PM and[GA.1] recently[GA. 2] admitting to drinking vodka 4 to 5 glasses from 4 PM until his sleep. Patient reported he get intoxicated at time but is not act bizarre.     Otherwise the patient today Medical history: The patient reporting that he have degenerative arthritis disease. He also reported that he has cyclic vomiting and every few month he is in the hospital for few days of vomiting and unable to know the cause. [GA.1]  Last admission for his Comment: about 6 oz of vodka everyday; drinks more during the weekend     Drug use: No          Current Medications:  LORazepam (ATIVAN) injection 0.5 mg, 0.5 mg, Intravenous, QID  [START ON 7/22/2020] magnesium sulfate IVPB premix 4 g, 4 g, Intravenou Cholecalciferol (VITAMIN D3) 1000 units Oral Cap, Take 1 tablet by mouth daily. Multiple Vitamin-Folic Acid Oral Tab, Take 1 tablet by mouth daily.     Metoclopramide HCl 10 MG Oral Tab, Take 1-2 tablets (10-20 mg total) by mouth every 6 (six) hours as nee The patient otherwise was alert and oriented to place, time and condition. The patient has been going through hallucination especially at night and confusion.   The patient reported that he has been feeling depressed hopeless and helpless but  The patien Comp Metabolic Panel (14)      CBC With Differential With Platelet      Comp Metabolic Panel (14)      Magnesium      Drug Screen 7 W/confirmation, urine Once      CBC With Differential With Platelet      Prothrombin Time (PT)      PTT, Activated Problem List:[SO.1] Patient Active Problem List:     Hemochromatosis     Hyperbilirubinemia     Cervical disc disorder with radiculopathy of cervical region     Acidosis     Intractable cyclical vomiting with nausea     Hypoglycemia     Toxic effect of alc or alleviating factors.     Hospital Course:   Acute alcoholic hepatitis.  Bilirubin and transaminases are quite high, steady.    Associated with coagulopathy, INR elevated at 1.35.    Total bilirubin seems to have stabilized around 17.  - GI consult apprec °C), temperature source Oral, resp. rate 18, height 6' (1.829 m), weight 135 lb (61.2 kg), SpO2 95 %. [SO.2]    Code: Full    Diet: As tolerated    Activity: As tolerated    Follow up: Vivian Ledesma MD In 2 weeks.     Spe Take 5 tablets (25 mg total) by mouth daily for 7 days.    Stop taking on:  September 7, 2020  Quantity:  35 tablet  Refills:  0     prednisoLONE 5 MG Tabs  Commonly known as:  ORAPRED  Start taking on:  September 8, 2020      Take 4 tablets (20 mg total) Quantity:  90 tablet  Refills:  1     Levocetirizine Dihydrochloride 5 MG Tabs  Commonly known as:  Xyzal  What changed:  when to take this      Take 1 tablet (5 mg total) by mouth nightly.    Quantity:  90 tablet  Refills:  0     Montelukast Sodium 10 MG Bring a paper prescription for each of these medications  · temazepam 15 MG Caps[SO. 2]           Discharge References/Attachments    Addiction, Alcohol ([place])  Addiction, Recovering ([place])         RICHELLE Suarez   7/30/2020 @ 10:50 AM    Greater - on alcohol cessation  -GI consult  - See additional Care Plan goals for specific interventions    Patient/Family Short Term Goal   (Resolved)     Interdisciplinary Completed    Description:  Patient's Short Term Goal: To return to baseline    Inte

## (undated) NOTE — LETTER
9/14/2020          To Whom It May Concern:    Barb Avila is currently under my medical care and may return to work without restrictions at this time. He may return to work on 09/15/2020.       If you require additional information please contact our o

## (undated) NOTE — LETTER
06/26/19      To Whom it May Concern:    Because of cervical radiculopathy, Keira Castro would benefit from a desk chair with head and neck support, as well as a desk which can be adjusted vertically.

## (undated) NOTE — LETTER
[address] [place] [address]      Authorization for Surgical Operation and Procedure     Date:___________                                                                                                         Time:_______ potential risks that can occur: fever and allergic reactions, hemolytic reactions, transmission of diseases such as Hepatitis, AIDS and Cytomegalovirus (CMV) and fluid overload.   In the event that I wish to have an autologous transfusion of my own blood, o attending physician will determine when the applicable recovery period ends for purposes of reinstating the DNAR order.   10. Patients having a sterilization procedure: I understand that if the procedure is successful the results will be permanent and it wi _______________________________________________________________ _____________________________  Carlyn Osorio Physician)                                                                                         (Date)                                   (Time

## (undated) NOTE — ED AVS SNAPSHOT
Mr. Pamela Howard   MRN: [id number]    Department:  [facility] Emergency Department   Date of Visit:  7/24/2018           Disclosure     Insurance plans vary and the physician(s) referred by the ER may not be covered by your plan.  Please conta CARE PHYSICIAN AT ONCE OR RETURN IMMEDIATELY TO THE EMERGENCY DEPARTMENT. If you have been prescribed any medication(s), please fill your prescription right away and begin taking the medication(s) as directed.   If you believe that any of the medications

## (undated) NOTE — ED AVS SNAPSHOT
Mr. Frdedie Bravo   MRN: [id number]    Department:  [facility] Emergency Department   Date of Visit:  12/28/2017           Disclosure     Insurance plans vary and the physician(s) referred by the ER may not be covered by your plan.  Please cont CARE PHYSICIAN AT ONCE OR RETURN IMMEDIATELY TO THE EMERGENCY DEPARTMENT. If you have been prescribed any medication(s), please fill your prescription right away and begin taking the medication(s) as directed.   If you believe that any of the medications

## (undated) NOTE — ED AVS SNAPSHOT
[facility] Emergency Department    [place] [address] [place] [address]    Phone:  [phone]    Fax:  [phone]           Mr. Pamela Howard   MRN: [id number]    Department:  [facility] Emergency Department   Date of Visit:  1 and Class Registration line at [phone] or find a doctor online by visiting [email or web address].    IF THERE IS ANY CHANGE OR WORSENING OF YOUR CONDITION, CALL YOUR PRIMARY CARE PHYSICIAN AT ONCE OR RETURN IMMEDIATELY TO [address] [place] [address].     If

## (undated) NOTE — LETTER
2020        RE: Holli Elders   [address]    [facility] [address]      1980         To Whom it May Concern,    Mr Lynsey Gasca has been under my care for the past three years for health concerns including alcohol dependence.   He is recov

## (undated) NOTE — LETTER
[facility] [place] [address]  [address] [facility] [address]  Dept: [phone]  Dept Fax: [phone]  Loc: [phone]      June 6, 2017    Patient: Karan Joshi   Date of Visit: 6/6/2017       To Whom It May Concern:    Qiana Casarez

## (undated) NOTE — ED AVS SNAPSHOT
Mr. Barb Avila   MRN: [id number]    Department:  [facility] Emergency Department   Date of Visit:  6/3/2019           Disclosure     Insurance plans vary and the physician(s) referred by the ER may not be covered by your plan.  Please contac CARE PHYSICIAN AT ONCE OR RETURN IMMEDIATELY TO THE EMERGENCY DEPARTMENT. If you have been prescribed any medication(s), please fill your prescription right away and begin taking the medication(s) as directed.   If you believe that any of the medications

## (undated) NOTE — LETTER
Hospital Discharge Documentation  Patient was discharged to [facility] SN[facility] # [phone]    From: [address] Hospitalist's Office  Phone: [phone]    Patient discharged time/date: 7/30/2020  5:20 PM  Patient discharge disposition:  SNF Cyclical vomiting with nausea     Cyclical vomiting with nausea, intractability of vomiting not specified     Dehydration     Alcohol withdrawal with delirium (HCC)     SETH (acute kidney injury) ([address]     Hypophosphatemia     Hypomagnesemia     Hypocalc - Bili, trending down, monitor     Delirium tremens. Patient has developed significant alcohol withdrawal in the last 24 hours. Has agitation, tremor, tachycardia, visual hallucinations, paranoia.   - off Restraints  - occ hallucination, but overall much Specialty:  Psychiatry  Why:  As needed  Contact information:  [address]  [phone]                     Discharge Condition: Stable    Discharge Medications:[SO.1]      Discharge Medications      START taking these prednisoLONE 5 MG Tabs  Commonly known as:  ORAPRED  Start taking on:  September 24, 2020      Take 2 tablets (10 mg total) by mouth daily for 7 days.    Stop taking on:  October 1, 2020  Quantity:  14 tablet  Refills:  0     prednisoLONE 5 MG Tabs  Commonl 1 Squirt by Nasal route 2 (two) times daily. Quantity:  3 Bottle  Refills:  1     Multiple Vitamin-Folic Acid Tabs      Take 1 tablet by mouth daily. Refills:  0     Omeprazole 40 MG Cpdr      Take 40 mg by mouth daily.    Refills:  0     Vitamin D3 25     [RZ.1]        Electronically signed by Stacie French MD on 7/30/2020  4:40 PM   Attribution Key     RZ. 1 - Stacie French MD on 7/30/2020  4:38 PM   SO.1 - RICHELLE Bruno on 7/30/2020 10:48 AM   SO.2 - RICHELLE Bruno on 7/30/2020 10:50 A

## (undated) NOTE — ED AVS SNAPSHOT
[facility] Emergency Department    [place] [address] [place] [address]    Phone:  [phone]    Fax:  [phone]           Mr. [address] Way   MRN: [id number]    Department:  [facility] Emergency Department   Date of Visit:  1 related to the care you received in our emergency department. Please call our [address] at [phone]. Your Emergency Department team is here to serve you. You are our top priority. You were examined and treated today on an urgent basis only.   Harsha Chatterjee that these instructions have been explained to me; all questions pertaining to these instructions have been answered in a satisfactory manner. 24-Hour Pharmacies        Pharmacy Address Phone Number   Ernie Shanks [address] ([address] [address] not sign up before the expiration date, you must request a new code. Your unique [employer] Access Code: [email or web address]  Expires: 3/30/2017  9:33 AM    If you have questions, you can call [phone] to talk to our [facility] Staff.  Remember, [employer]

## (undated) NOTE — ED AVS SNAPSHOT
[facility] AND [facility] in [facility] [address] [place] [address] [address] [facility] [address]    Phone:  [phone]    Fax:  [phone]           Mr. Pamela Howard   MRN: [id number]    Department:  [facility] in [place]   Date of Visit: Insurance plans vary and the physician(s) referred by the Immediate Care may not be covered by your plan. It is possible that the physician may not participate in your health insurance plan.   This may result in a lower benefit level being available to yo CARE PHYSICIAN AT ONCE OR GO TO THE EMERGENCY DEPARTMENT. If you have been prescribed any medication(s), please fill your prescription right away and begin taking the medication(s) as directed.   If you believe that any of the medications or instructions - If you have concerns related to behavioral health issues or thoughts of harming yourself, contact [address] [place] [address] at [phone].     - If you don’t have insurance, Lois Lozano has partnered with [employer]

## (undated) NOTE — ED AVS SNAPSHOT
Mr. Haja Alonzo   MRN: [id number]    Department:  [facility] Emergency Department   Date of Visit:  9/16/2018           Disclosure     Insurance plans vary and the physician(s) referred by the ER may not be covered by your plan.  Please conta CARE PHYSICIAN AT ONCE OR RETURN IMMEDIATELY TO THE EMERGENCY DEPARTMENT. If you have been prescribed any medication(s), please fill your prescription right away and begin taking the medication(s) as directed.   If you believe that any of the medications

## (undated) NOTE — LETTER
Hospital Discharge Documentation  Please phone to schedule a hospital follow up appointment.     From: [address] Hospitalist's Office  Phone: [phone]    Patient discharged time/date: 9/5/2019  1:03 PM  Patient discharge disposition:  Home or Self Closed fracture of middle or proximal phalanx or phalanges of hand     Stiffness of joint, hand     Throat pain     Cyclical vomiting with nausea     Cyclical vomiting with nausea, intractability of vomiting not specified     Dehydration     Alcohol wit Pt was given IVF, thiamine and folic acid  - counseled on etoh cessation     Protein energy malnutrition, moderate  Likely secondary to poor p.o. Intake.   Tolerating diet now.     dvt proph:   heparin     Code status:   Full    Consultations:   Nephrology Place 1 suppository (25 mg total) rectally every 12 (twelve) hours as needed for Nausea. Quantity:  10 suppository  Refills:  0     Vitamin D3 1000 units Caps      Take 1 tablet by mouth daily. Refills:  0            Follow up Visits:     Follow-up Inf

## (undated) NOTE — IP AVS SNAPSHOT
[facility]            (For Outpatient Use Only) Initial Admit Date: 7/18/2020   Inpt/Obs Admit Date: Inpt: 7/18/20 / Obs: N/A   Discharge Date:    Christi Workman:  [de-identified]   MRN: [de-identified]   CSN: [id number]   CEID: [id number]        [employer] Group Number:  Insurance Type:    Subscriber Name:  Subscriber :    Subscriber ID:  Pt Rel to Subscriber:    Hospital Account Financial Class: [facility]    2020

## (undated) NOTE — LETTER
05/11/18        [address] [place] [address] [address] Brianne June [address]      Dear Lilly Scheuermann,    [facility] records indicate that you have outstanding lab work and or testing that was ordered for you and has not yet been completed:  CBC with Differential with Seng